# Patient Record
Sex: MALE | Race: WHITE | NOT HISPANIC OR LATINO
[De-identification: names, ages, dates, MRNs, and addresses within clinical notes are randomized per-mention and may not be internally consistent; named-entity substitution may affect disease eponyms.]

---

## 2017-03-13 ENCOUNTER — APPOINTMENT (OUTPATIENT)
Dept: CARDIOLOGY | Facility: CLINIC | Age: 77
End: 2017-03-13

## 2017-03-13 ENCOUNTER — NON-APPOINTMENT (OUTPATIENT)
Age: 77
End: 2017-03-13

## 2017-03-13 VITALS
OXYGEN SATURATION: 97 % | WEIGHT: 212 LBS | SYSTOLIC BLOOD PRESSURE: 126 MMHG | DIASTOLIC BLOOD PRESSURE: 89 MMHG | TEMPERATURE: 97.8 F | BODY MASS INDEX: 31.4 KG/M2 | HEIGHT: 69 IN | HEART RATE: 64 BPM

## 2017-03-13 DIAGNOSIS — R73.03 PREDIABETES.: ICD-10-CM

## 2017-03-15 ENCOUNTER — MEDICATION RENEWAL (OUTPATIENT)
Age: 77
End: 2017-03-15

## 2017-03-15 LAB
ANION GAP SERPL CALC-SCNC: 16 MMOL/L
BUN SERPL-MCNC: 24 MG/DL
CALCIUM SERPL-MCNC: 10 MG/DL
CHLORIDE SERPL-SCNC: 96 MMOL/L
CO2 SERPL-SCNC: 27 MMOL/L
CREAT SERPL-MCNC: 1.13 MG/DL
GLUCOSE SERPL-MCNC: 77 MG/DL
HBA1C MFR BLD HPLC: 5.7 %
POTASSIUM SERPL-SCNC: 3.5 MMOL/L
SODIUM SERPL-SCNC: 139 MMOL/L

## 2017-04-24 ENCOUNTER — RX RENEWAL (OUTPATIENT)
Age: 77
End: 2017-04-24

## 2017-05-01 ENCOUNTER — RX RENEWAL (OUTPATIENT)
Age: 77
End: 2017-05-01

## 2017-06-19 ENCOUNTER — MEDICATION RENEWAL (OUTPATIENT)
Age: 77
End: 2017-06-19

## 2017-06-19 RX ORDER — SUVOREXANT 20 MG/1
20 TABLET, FILM COATED ORAL
Refills: 0 | Status: ACTIVE | COMMUNITY

## 2017-09-25 ENCOUNTER — APPOINTMENT (OUTPATIENT)
Dept: PULMONOLOGY | Facility: CLINIC | Age: 77
End: 2017-09-25
Payer: MEDICARE

## 2017-09-25 VITALS
DIASTOLIC BLOOD PRESSURE: 90 MMHG | TEMPERATURE: 95 F | OXYGEN SATURATION: 97 % | HEIGHT: 69 IN | WEIGHT: 209 LBS | BODY MASS INDEX: 30.96 KG/M2 | HEART RATE: 63 BPM | RESPIRATION RATE: 18 BRPM | SYSTOLIC BLOOD PRESSURE: 150 MMHG

## 2017-09-25 PROCEDURE — 99204 OFFICE O/P NEW MOD 45 MIN: CPT

## 2017-10-09 ENCOUNTER — RX RENEWAL (OUTPATIENT)
Age: 77
End: 2017-10-09

## 2017-10-10 ENCOUNTER — APPOINTMENT (OUTPATIENT)
Dept: PULMONOLOGY | Facility: CLINIC | Age: 77
End: 2017-10-10
Payer: MEDICARE

## 2017-10-10 PROCEDURE — 96101: CPT

## 2017-10-10 PROCEDURE — 90791 PSYCH DIAGNOSTIC EVALUATION: CPT | Mod: 59

## 2017-10-17 ENCOUNTER — APPOINTMENT (OUTPATIENT)
Dept: PULMONOLOGY | Facility: CLINIC | Age: 77
End: 2017-10-17
Payer: MEDICARE

## 2017-10-17 PROCEDURE — 90837 PSYTX W PT 60 MINUTES: CPT

## 2017-10-18 ENCOUNTER — MEDICATION RENEWAL (OUTPATIENT)
Age: 77
End: 2017-10-18

## 2017-10-24 ENCOUNTER — APPOINTMENT (OUTPATIENT)
Dept: PULMONOLOGY | Facility: CLINIC | Age: 77
End: 2017-10-24
Payer: MEDICARE

## 2017-10-24 PROCEDURE — 90837 PSYTX W PT 60 MINUTES: CPT

## 2017-10-27 ENCOUNTER — RX RENEWAL (OUTPATIENT)
Age: 77
End: 2017-10-27

## 2017-10-31 ENCOUNTER — APPOINTMENT (OUTPATIENT)
Dept: PULMONOLOGY | Facility: CLINIC | Age: 77
End: 2017-10-31
Payer: MEDICARE

## 2017-10-31 PROCEDURE — 90837 PSYTX W PT 60 MINUTES: CPT

## 2017-11-02 ENCOUNTER — APPOINTMENT (OUTPATIENT)
Dept: CARDIOLOGY | Facility: CLINIC | Age: 77
End: 2017-11-02
Payer: MEDICARE

## 2017-11-02 ENCOUNTER — NON-APPOINTMENT (OUTPATIENT)
Age: 77
End: 2017-11-02

## 2017-11-02 VITALS
WEIGHT: 207 LBS | HEART RATE: 66 BPM | DIASTOLIC BLOOD PRESSURE: 60 MMHG | SYSTOLIC BLOOD PRESSURE: 151 MMHG | OXYGEN SATURATION: 94 % | BODY MASS INDEX: 30.57 KG/M2

## 2017-11-02 DIAGNOSIS — F51.04 PSYCHOPHYSIOLOGIC INSOMNIA: ICD-10-CM

## 2017-11-02 DIAGNOSIS — J44.9 CHRONIC OBSTRUCTIVE PULMONARY DISEASE, UNSPECIFIED: ICD-10-CM

## 2017-11-02 PROCEDURE — 93000 ELECTROCARDIOGRAM COMPLETE: CPT

## 2017-11-02 PROCEDURE — 99214 OFFICE O/P EST MOD 30 MIN: CPT

## 2017-11-02 PROCEDURE — 36415 COLL VENOUS BLD VENIPUNCTURE: CPT

## 2017-11-02 RX ORDER — DIGOXIN 125 UG/1
125 TABLET ORAL
Qty: 90 | Refills: 3 | Status: DISCONTINUED | COMMUNITY
Start: 2017-04-24 | End: 2017-11-02

## 2017-11-03 LAB
ALBUMIN SERPL ELPH-MCNC: 4.4 G/DL
ALP BLD-CCNC: 67 U/L
ALT SERPL-CCNC: 29 U/L
ANION GAP SERPL CALC-SCNC: 16 MMOL/L
AST SERPL-CCNC: 31 U/L
BASOPHILS # BLD AUTO: 0.05 K/UL
BASOPHILS NFR BLD AUTO: 0.5 %
BILIRUB SERPL-MCNC: 0.3 MG/DL
BUN SERPL-MCNC: 24 MG/DL
CALCIUM SERPL-MCNC: 10.2 MG/DL
CHLORIDE SERPL-SCNC: 99 MMOL/L
CHOLEST SERPL-MCNC: 137 MG/DL
CHOLEST/HDLC SERPL: 4.3 RATIO
CO2 SERPL-SCNC: 28 MMOL/L
CREAT SERPL-MCNC: 1.29 MG/DL
EOSINOPHIL # BLD AUTO: 0.2 K/UL
EOSINOPHIL NFR BLD AUTO: 1.9 %
GLUCOSE SERPL-MCNC: 99 MG/DL
HBA1C MFR BLD HPLC: 5.6 %
HCT VFR BLD CALC: 47 %
HDLC SERPL-MCNC: 32 MG/DL
HGB BLD-MCNC: 15.8 G/DL
IMM GRANULOCYTES NFR BLD AUTO: 0.5 %
LDLC SERPL CALC-MCNC: 55 MG/DL
LYMPHOCYTES # BLD AUTO: 2.67 K/UL
LYMPHOCYTES NFR BLD AUTO: 25.1 %
MAN DIFF?: NORMAL
MCHC RBC-ENTMCNC: 30.3 PG
MCHC RBC-ENTMCNC: 33.6 GM/DL
MCV RBC AUTO: 90.2 FL
MONOCYTES # BLD AUTO: 0.97 K/UL
MONOCYTES NFR BLD AUTO: 9.1 %
NEUTROPHILS # BLD AUTO: 6.69 K/UL
NEUTROPHILS NFR BLD AUTO: 62.9 %
PLATELET # BLD AUTO: 299 K/UL
POTASSIUM SERPL-SCNC: 3.5 MMOL/L
PROT SERPL-MCNC: 7.6 G/DL
PSA SERPL-MCNC: 2.58 NG/ML
RBC # BLD: 5.21 M/UL
RBC # FLD: 13.7 %
SODIUM SERPL-SCNC: 143 MMOL/L
TRIGL SERPL-MCNC: 251 MG/DL
TSH SERPL-ACNC: 1.36 UIU/ML
WBC # FLD AUTO: 10.63 K/UL

## 2017-11-08 ENCOUNTER — APPOINTMENT (OUTPATIENT)
Dept: PULMONOLOGY | Facility: CLINIC | Age: 77
End: 2017-11-08

## 2017-11-08 ENCOUNTER — APPOINTMENT (OUTPATIENT)
Dept: PULMONOLOGY | Facility: CLINIC | Age: 77
End: 2017-11-08
Payer: MEDICARE

## 2017-11-08 PROCEDURE — 90837 PSYTX W PT 60 MINUTES: CPT

## 2017-11-22 ENCOUNTER — APPOINTMENT (OUTPATIENT)
Dept: PULMONOLOGY | Facility: CLINIC | Age: 77
End: 2017-11-22

## 2017-12-04 ENCOUNTER — RX RENEWAL (OUTPATIENT)
Age: 77
End: 2017-12-04

## 2017-12-20 ENCOUNTER — RX RENEWAL (OUTPATIENT)
Age: 77
End: 2017-12-20

## 2017-12-20 ENCOUNTER — MEDICATION RENEWAL (OUTPATIENT)
Age: 77
End: 2017-12-20

## 2017-12-20 RX ORDER — TRAZODONE HYDROCHLORIDE 100 MG/1
100 TABLET ORAL
Qty: 30 | Refills: 0 | Status: ACTIVE | COMMUNITY
Start: 2017-09-25 | End: 1900-01-01

## 2017-12-22 ENCOUNTER — RX RENEWAL (OUTPATIENT)
Age: 77
End: 2017-12-22

## 2018-01-31 ENCOUNTER — RX RENEWAL (OUTPATIENT)
Age: 78
End: 2018-01-31

## 2018-02-26 ENCOUNTER — MEDICATION RENEWAL (OUTPATIENT)
Age: 78
End: 2018-02-26

## 2018-03-08 ENCOUNTER — NON-APPOINTMENT (OUTPATIENT)
Age: 78
End: 2018-03-08

## 2018-03-08 ENCOUNTER — APPOINTMENT (OUTPATIENT)
Dept: CARDIOLOGY | Facility: CLINIC | Age: 78
End: 2018-03-08
Payer: MEDICARE

## 2018-03-08 VITALS
WEIGHT: 211 LBS | SYSTOLIC BLOOD PRESSURE: 166 MMHG | DIASTOLIC BLOOD PRESSURE: 81 MMHG | HEART RATE: 67 BPM | OXYGEN SATURATION: 94 % | HEIGHT: 69 IN | TEMPERATURE: 98.6 F | BODY MASS INDEX: 31.25 KG/M2

## 2018-03-08 VITALS — SYSTOLIC BLOOD PRESSURE: 140 MMHG | DIASTOLIC BLOOD PRESSURE: 70 MMHG

## 2018-03-08 DIAGNOSIS — F32.9 MAJOR DEPRESSIVE DISORDER, SINGLE EPISODE, UNSPECIFIED: ICD-10-CM

## 2018-03-08 DIAGNOSIS — I48.91 UNSPECIFIED ATRIAL FIBRILLATION: ICD-10-CM

## 2018-03-08 DIAGNOSIS — I10 ESSENTIAL (PRIMARY) HYPERTENSION: ICD-10-CM

## 2018-03-08 PROCEDURE — 99214 OFFICE O/P EST MOD 30 MIN: CPT

## 2018-03-08 PROCEDURE — 93000 ELECTROCARDIOGRAM COMPLETE: CPT

## 2018-05-29 ENCOUNTER — RX RENEWAL (OUTPATIENT)
Age: 78
End: 2018-05-29

## 2018-07-13 ENCOUNTER — MEDICATION RENEWAL (OUTPATIENT)
Age: 78
End: 2018-07-13

## 2018-07-17 ENCOUNTER — MEDICATION RENEWAL (OUTPATIENT)
Age: 78
End: 2018-07-17

## 2018-07-18 ENCOUNTER — MEDICATION RENEWAL (OUTPATIENT)
Age: 78
End: 2018-07-18

## 2018-07-22 PROBLEM — R73.03 PREDIABETES: Status: ACTIVE | Noted: 2017-03-13

## 2018-08-27 ENCOUNTER — RX RENEWAL (OUTPATIENT)
Age: 78
End: 2018-08-27

## 2018-09-13 ENCOUNTER — APPOINTMENT (OUTPATIENT)
Dept: CARDIOLOGY | Facility: CLINIC | Age: 78
End: 2018-09-13

## 2021-06-12 ENCOUNTER — EMERGENCY (EMERGENCY)
Facility: HOSPITAL | Age: 81
LOS: 1 days | Discharge: HOME | End: 2021-06-12
Attending: EMERGENCY MEDICINE | Admitting: STUDENT IN AN ORGANIZED HEALTH CARE EDUCATION/TRAINING PROGRAM
Payer: MEDICARE

## 2021-06-12 VITALS
SYSTOLIC BLOOD PRESSURE: 150 MMHG | HEART RATE: 88 BPM | TEMPERATURE: 101 F | OXYGEN SATURATION: 95 % | DIASTOLIC BLOOD PRESSURE: 80 MMHG | HEIGHT: 69 IN | WEIGHT: 199.96 LBS | RESPIRATION RATE: 18 BRPM

## 2021-06-12 VITALS
TEMPERATURE: 98 F | DIASTOLIC BLOOD PRESSURE: 70 MMHG | HEART RATE: 80 BPM | RESPIRATION RATE: 18 BRPM | OXYGEN SATURATION: 94 % | SYSTOLIC BLOOD PRESSURE: 160 MMHG

## 2021-06-12 LAB
ALBUMIN SERPL ELPH-MCNC: 3.6 G/DL — SIGNIFICANT CHANGE UP (ref 3.3–5)
ALP SERPL-CCNC: 80 U/L — SIGNIFICANT CHANGE UP (ref 40–120)
ALT FLD-CCNC: 20 U/L — SIGNIFICANT CHANGE UP (ref 12–78)
ANION GAP SERPL CALC-SCNC: 7 MMOL/L — SIGNIFICANT CHANGE UP (ref 5–17)
APPEARANCE UR: CLEAR — SIGNIFICANT CHANGE UP
APTT BLD: 30 SEC — SIGNIFICANT CHANGE UP (ref 27.5–35.5)
AST SERPL-CCNC: 14 U/L — LOW (ref 15–37)
BASOPHILS # BLD AUTO: 0.03 K/UL — SIGNIFICANT CHANGE UP (ref 0–0.2)
BASOPHILS NFR BLD AUTO: 0.3 % — SIGNIFICANT CHANGE UP (ref 0–2)
BILIRUB SERPL-MCNC: 0.6 MG/DL — SIGNIFICANT CHANGE UP (ref 0.2–1.2)
BILIRUB UR-MCNC: NEGATIVE — SIGNIFICANT CHANGE UP
BUN SERPL-MCNC: 21 MG/DL — SIGNIFICANT CHANGE UP (ref 7–23)
CALCIUM SERPL-MCNC: 8.8 MG/DL — SIGNIFICANT CHANGE UP (ref 8.5–10.1)
CHLORIDE SERPL-SCNC: 102 MMOL/L — SIGNIFICANT CHANGE UP (ref 96–108)
CO2 SERPL-SCNC: 31 MMOL/L — SIGNIFICANT CHANGE UP (ref 22–31)
COLOR SPEC: YELLOW — SIGNIFICANT CHANGE UP
CREAT SERPL-MCNC: 0.97 MG/DL — SIGNIFICANT CHANGE UP (ref 0.5–1.3)
DIFF PNL FLD: NEGATIVE — SIGNIFICANT CHANGE UP
DIGOXIN SERPL-MCNC: 0.1 NG/ML — LOW (ref 0.8–2)
EOSINOPHIL # BLD AUTO: 0.02 K/UL — SIGNIFICANT CHANGE UP (ref 0–0.5)
EOSINOPHIL NFR BLD AUTO: 0.2 % — SIGNIFICANT CHANGE UP (ref 0–6)
GLUCOSE SERPL-MCNC: 112 MG/DL — HIGH (ref 70–99)
GLUCOSE UR QL: NEGATIVE — SIGNIFICANT CHANGE UP
HCT VFR BLD CALC: 46.9 % — SIGNIFICANT CHANGE UP (ref 39–50)
HGB BLD-MCNC: 16 G/DL — SIGNIFICANT CHANGE UP (ref 13–17)
IMM GRANULOCYTES NFR BLD AUTO: 0.4 % — SIGNIFICANT CHANGE UP (ref 0–1.5)
INR BLD: 1.22 RATIO — HIGH (ref 0.88–1.16)
KETONES UR-MCNC: NEGATIVE — SIGNIFICANT CHANGE UP
LACTATE SERPL-SCNC: 1.4 MMOL/L — SIGNIFICANT CHANGE UP (ref 0.7–2)
LEUKOCYTE ESTERASE UR-ACNC: ABNORMAL
LYMPHOCYTES # BLD AUTO: 0.85 K/UL — LOW (ref 1–3.3)
LYMPHOCYTES # BLD AUTO: 8.7 % — LOW (ref 13–44)
MCHC RBC-ENTMCNC: 29.3 PG — SIGNIFICANT CHANGE UP (ref 27–34)
MCHC RBC-ENTMCNC: 34.1 GM/DL — SIGNIFICANT CHANGE UP (ref 32–36)
MCV RBC AUTO: 85.9 FL — SIGNIFICANT CHANGE UP (ref 80–100)
MONOCYTES # BLD AUTO: 0.77 K/UL — SIGNIFICANT CHANGE UP (ref 0–0.9)
MONOCYTES NFR BLD AUTO: 7.9 % — SIGNIFICANT CHANGE UP (ref 2–14)
NEUTROPHILS # BLD AUTO: 8.02 K/UL — HIGH (ref 1.8–7.4)
NEUTROPHILS NFR BLD AUTO: 82.5 % — HIGH (ref 43–77)
NITRITE UR-MCNC: NEGATIVE — SIGNIFICANT CHANGE UP
NRBC # BLD: 0 /100 WBCS — SIGNIFICANT CHANGE UP (ref 0–0)
PH UR: 6.5 — SIGNIFICANT CHANGE UP (ref 5–8)
PLATELET # BLD AUTO: 240 K/UL — SIGNIFICANT CHANGE UP (ref 150–400)
POTASSIUM SERPL-MCNC: 3 MMOL/L — LOW (ref 3.5–5.3)
POTASSIUM SERPL-SCNC: 3 MMOL/L — LOW (ref 3.5–5.3)
PROCALCITONIN SERPL-MCNC: 0.16 NG/ML — HIGH (ref 0–0.04)
PROT SERPL-MCNC: 7.5 G/DL — SIGNIFICANT CHANGE UP (ref 6–8.3)
PROT UR-MCNC: NEGATIVE — SIGNIFICANT CHANGE UP
PROTHROM AB SERPL-ACNC: 14.1 SEC — HIGH (ref 10.6–13.6)
RAPID RVP RESULT: SIGNIFICANT CHANGE UP
RBC # BLD: 5.46 M/UL — SIGNIFICANT CHANGE UP (ref 4.2–5.8)
RBC # FLD: 13.3 % — SIGNIFICANT CHANGE UP (ref 10.3–14.5)
SARS-COV-2 RNA SPEC QL NAA+PROBE: SIGNIFICANT CHANGE UP
SODIUM SERPL-SCNC: 140 MMOL/L — SIGNIFICANT CHANGE UP (ref 135–145)
SP GR SPEC: 1.01 — SIGNIFICANT CHANGE UP (ref 1.01–1.02)
UROBILINOGEN FLD QL: NEGATIVE — SIGNIFICANT CHANGE UP
WBC # BLD: 9.73 K/UL — SIGNIFICANT CHANGE UP (ref 3.8–10.5)
WBC # FLD AUTO: 9.73 K/UL — SIGNIFICANT CHANGE UP (ref 3.8–10.5)

## 2021-06-12 PROCEDURE — 71045 X-RAY EXAM CHEST 1 VIEW: CPT | Mod: 26

## 2021-06-12 PROCEDURE — 74176 CT ABD & PELVIS W/O CONTRAST: CPT | Mod: 26,MA

## 2021-06-12 PROCEDURE — 87040 BLOOD CULTURE FOR BACTERIA: CPT

## 2021-06-12 PROCEDURE — 71045 X-RAY EXAM CHEST 1 VIEW: CPT

## 2021-06-12 PROCEDURE — 99285 EMERGENCY DEPT VISIT HI MDM: CPT | Mod: 25

## 2021-06-12 PROCEDURE — 71250 CT THORAX DX C-: CPT | Mod: 26,MA

## 2021-06-12 PROCEDURE — 71250 CT THORAX DX C-: CPT

## 2021-06-12 PROCEDURE — 93010 ELECTROCARDIOGRAM REPORT: CPT

## 2021-06-12 PROCEDURE — 85025 COMPLETE CBC W/AUTO DIFF WBC: CPT

## 2021-06-12 PROCEDURE — 0225U NFCT DS DNA&RNA 21 SARSCOV2: CPT

## 2021-06-12 PROCEDURE — 80162 ASSAY OF DIGOXIN TOTAL: CPT

## 2021-06-12 PROCEDURE — 80053 COMPREHEN METABOLIC PANEL: CPT

## 2021-06-12 PROCEDURE — 93005 ELECTROCARDIOGRAM TRACING: CPT

## 2021-06-12 PROCEDURE — 87086 URINE CULTURE/COLONY COUNT: CPT

## 2021-06-12 PROCEDURE — 74176 CT ABD & PELVIS W/O CONTRAST: CPT

## 2021-06-12 PROCEDURE — 84145 PROCALCITONIN (PCT): CPT

## 2021-06-12 PROCEDURE — 83605 ASSAY OF LACTIC ACID: CPT

## 2021-06-12 PROCEDURE — 85730 THROMBOPLASTIN TIME PARTIAL: CPT

## 2021-06-12 PROCEDURE — 96365 THER/PROPH/DIAG IV INF INIT: CPT

## 2021-06-12 PROCEDURE — 81001 URINALYSIS AUTO W/SCOPE: CPT

## 2021-06-12 PROCEDURE — 99284 EMERGENCY DEPT VISIT MOD MDM: CPT | Mod: CS

## 2021-06-12 PROCEDURE — 85610 PROTHROMBIN TIME: CPT

## 2021-06-12 PROCEDURE — 36415 COLL VENOUS BLD VENIPUNCTURE: CPT

## 2021-06-12 RX ORDER — ACETAMINOPHEN 500 MG
650 TABLET ORAL ONCE
Refills: 0 | Status: COMPLETED | OUTPATIENT
Start: 2021-06-12 | End: 2021-06-12

## 2021-06-12 RX ORDER — SODIUM CHLORIDE 9 MG/ML
2800 INJECTION, SOLUTION INTRAVENOUS ONCE
Refills: 0 | Status: COMPLETED | OUTPATIENT
Start: 2021-06-12 | End: 2021-06-12

## 2021-06-12 RX ORDER — CEFTRIAXONE 500 MG/1
1000 INJECTION, POWDER, FOR SOLUTION INTRAMUSCULAR; INTRAVENOUS ONCE
Refills: 0 | Status: COMPLETED | OUTPATIENT
Start: 2021-06-12 | End: 2021-06-12

## 2021-06-12 RX ORDER — POTASSIUM CHLORIDE 20 MEQ
40 PACKET (EA) ORAL ONCE
Refills: 0 | Status: COMPLETED | OUTPATIENT
Start: 2021-06-12 | End: 2021-06-12

## 2021-06-12 RX ADMIN — Medication 40 MILLIEQUIVALENT(S): at 20:04

## 2021-06-12 RX ADMIN — SODIUM CHLORIDE 2800 MILLILITER(S): 9 INJECTION, SOLUTION INTRAVENOUS at 17:23

## 2021-06-12 RX ADMIN — CEFTRIAXONE 1000 MILLIGRAM(S): 500 INJECTION, POWDER, FOR SOLUTION INTRAMUSCULAR; INTRAVENOUS at 22:00

## 2021-06-12 RX ADMIN — Medication 650 MILLIGRAM(S): at 17:23

## 2021-06-12 RX ADMIN — CEFTRIAXONE 100 MILLIGRAM(S): 500 INJECTION, POWDER, FOR SOLUTION INTRAMUSCULAR; INTRAVENOUS at 21:34

## 2021-06-12 NOTE — ED ADULT NURSE NOTE - OBJECTIVE STATEMENT
Patient is a 79yo male presenting to [facility] with weakness and fever shortness of breath  x 1 month. Patient denies pain in abdominal chest pain nausea vomiting diarrhea Patient states that he has been feeling tired and weak for weeks but today was worse then other days. Patient is disoriented to time

## 2021-06-12 NOTE — ED ADULT NURSE NOTE - NSIMPLEMENTINTERV_GEN_ALL_ED
Implemented All Fall Risk Interventions:  [place] to call system. Call bell, personal items and telephone within reach. Instruct patient to call for assistance. Room bathroom lighting operational. Non-slip footwear when patient is off stretcher. Physically safe environment: no spills, clutter or unnecessary equipment. Stretcher in lowest position, wheels locked, appropriate side rails in place. Provide visual cue, wrist band, yellow gown, etc. Monitor gait and stability. Monitor for mental status changes and reorient to person, place, and time. Review medications for side effects contributing to fall risk. Reinforce activity limits and safety measures with patient and family.

## 2021-06-12 NOTE — ED ADULT NURSE NOTE - NS TRANSFER EYEGLASSES PAIRS
1 pair Ear Star Wedge Flap Text: The defect edges were debeveled with a #15 blade scalpel.  Given the location of the defect and the proximity to free margins (helical rim) an ear star wedge flap was deemed most appropriate.  Using a sterile surgical marker, the appropriate flap was drawn incorporating the defect and placing the expected incisions between the helical rim and antihelix where possible.  The area thus outlined was incised through and through with a #15 scalpel blade.

## 2021-06-12 NOTE — ED ADULT NURSE REASSESSMENT NOTE - NS ED NURSE REASSESS COMMENT FT1
Patient ambulated around nurses station with a walker with steady gait. Oxygen after ambulation 94% room. Respiration even and not labored. Dr. Samaniego made aware. Family to   patient for discharge. Rosa PEREZ

## 2021-06-12 NOTE — ED PROVIDER NOTE - OBJECTIVE STATEMENT
79 yo white male with H/O A.Fib, HTN and Asthma as well as Depression who was in his baseline state of health up until 1-2 days when he noted becoming weaker and more fatigued. Daughter found patient much weaker than usual being unable to ambulate to bathroom at home. EMS was ultimately called and patient was brought to ED for further evaluation and management. Denies any noted fever, chills, nausea, vomiting, diarrhea, cough, abdominal pains or dysuria/hematuria. Patient did have Covid Immunization in February.

## 2021-06-12 NOTE — ED PROVIDER NOTE - CLINICAL SUMMARY MEDICAL DECISION MAKING FREE TEXT BOX
Weakness and fatigue noted to be febrile in ED now requiring evaluation, labs, ecg, imaging as well as IVFS and  meds.

## 2021-06-12 NOTE — ED PROVIDER NOTE - NSFOLLOWUPINSTRUCTIONS_ED_ALL_ED_FT
Please follow up with your Primary Care Physician as soon as possible.  Please take your medications as prescribed.  If your symptoms persist or worsen, please seek care. Either return to the Emergency Department, go to urgent care or see your primary care doctor.   You potassium was slightly low, you were given a potassium supplement in the ER. Please eat a diet rich in potassium, foods you can eat include potatoes, bananas, orange juice.    ====================================================================    Fever in Adults    WHAT YOU NEED TO KNOW:    A fever is an increase in your body temperature. Normal body temperature is 98.6°F (37°C). Fever is generally defined as greater than 100.4°F (38°C). Common causes include an infection, injury, or disease such as arthritis.    DISCHARGE INSTRUCTIONS:    Return to the emergency department if:     Your fever does not go away or gets worse even after treatment.      You have a stiff neck and a bad headache.       You are confused. You may not be able to think clearly or remember things like you normally do.       Your heart beats faster than usual even after treatment.      You have shortness of breath or chest pain when you breathe.      You urinate small amounts or not at all.       Your skin, lips, or nails turn blue.     Contact your healthcare provider if:     You have abdominal pain or you feel bloated.      You have nausea or are vomiting.      You have pain or burning when you urinate, or you have pain in your back.      You have questions or concerns about your condition or care.     Medicines: You may need any of the following:     NSAIDs, such as ibuprofen, help decrease swelling, pain, and fever. This medicine is available with or without a doctor's order. NSAIDs can cause stomach bleeding or kidney problems in certain people. If you take blood thinner medicine, always ask if NSAIDs are safe for you. Always read the medicine label and follow directions. Do not give these medicines to children under 6 months of age without direction from your child's healthcare provider.      Acetaminophen decreases pain and fever. It is available without a doctor's order. Ask how much to take and how often to take it. Follow directions. Read the labels of all other medicines you are using to see if they also contain acetaminophen, or ask your doctor or pharmacist. Acetaminophen can cause liver damage if not taken correctly. Do not use more than 4 grams (4,000 milligrams) total of acetaminophen in one day.       Antibiotics may be given if you have an infection caused by bacteria.      Take your medicine as directed. Contact your healthcare provider if you think your medicine is not helping or if you have side effects. Tell him of her if you are allergic to any medicine. Keep a list of the medicines, vitamins, and herbs you take. Include the amounts, and when and why you take them. Bring the list or the pill bottles to follow-up visits. Carry your medicine list with you in case of an emergency.    Follow up with your healthcare provider as directed: Write down your questions so you remember to ask them during your visits.    Self-care:     Drink more liquids as directed. A fever makes you sweat. This can increase your risk for dehydration. Liquids can help prevent dehydration.   Drink at least 6 to 8 eight-ounce cups of clear liquids each day. Drink water, juice, or broth. Do not drink sports drinks. They may contain caffeine.      Ask your healthcare provider if you should drink an oral rehydration solution (ORS). An ORS has the right amounts of water, salts, and sugar you need to replace body fluids.      Dress in lightweight clothes. Shivers may be a sign that your fever is rising. Do not put extra blankets or clothes on. This may cause your fever to rise even higher. Dress in light, comfortable clothing. Use a lightweight blanket or sheet when you sleep. Change your clothes, blanket, or sheets if they get wet.      Cool yourself safely. Take a bath in cool or lukewarm water. Use an ice pack wrapped in a small towel or wet a washcloth with cool water. Place the ice pack or wet washcloth on your forehead or the back of your neck.

## 2021-06-12 NOTE — ED PROVIDER NOTE - PROGRESS NOTE DETAILS
O2 sat on Room Air now at 93-96%. Will get CT scan to assess and search for fever etiology. KV: s/o was to follow up CTs, CTs with incidental findings, patient and family made aware. patient feels well, was ambulated with spO2 not dropping. will discharge.

## 2021-06-12 NOTE — ED PROVIDER NOTE - PATIENT PORTAL LINK FT
You can access the FollowMyHealth Patient Portal offered by [facility] by registering at the following website: [email or web address]. By joining [facility]’s FollowMyHealth portal, you will also be able to view your health information using other applications (apps) compatible with our system.

## 2021-06-12 NOTE — ED PROVIDER NOTE - CONSTITUTIONAL, MLM
normal... Weak appearing obese, elderly white male, awake, oriented to person, place, time/situation in no apparent distress.

## 2021-06-13 LAB
CULTURE RESULTS: SIGNIFICANT CHANGE UP
SPECIMEN SOURCE: SIGNIFICANT CHANGE UP

## 2021-06-17 LAB
CULTURE RESULTS: SIGNIFICANT CHANGE UP
CULTURE RESULTS: SIGNIFICANT CHANGE UP
SPECIMEN SOURCE: SIGNIFICANT CHANGE UP
SPECIMEN SOURCE: SIGNIFICANT CHANGE UP

## 2021-09-26 ENCOUNTER — TRANSCRIPTION ENCOUNTER (OUTPATIENT)
Age: 81
End: 2021-09-26

## 2023-01-17 ENCOUNTER — INPATIENT (INPATIENT)
Facility: HOSPITAL | Age: 83
LOS: 8 days | Discharge: OTHER INSTITUTION | DRG: 864 | End: 2023-01-26
Attending: INTERNAL MEDICINE | Admitting: INTERNAL MEDICINE
Payer: MEDICARE

## 2023-01-17 VITALS
TEMPERATURE: 99 F | DIASTOLIC BLOOD PRESSURE: 94 MMHG | WEIGHT: 199.96 LBS | SYSTOLIC BLOOD PRESSURE: 174 MMHG | OXYGEN SATURATION: 93 % | HEIGHT: 68 IN | HEART RATE: 82 BPM | RESPIRATION RATE: 20 BRPM

## 2023-01-17 DIAGNOSIS — N40.0 BENIGN PROSTATIC HYPERPLASIA WITHOUT LOWER URINARY TRACT SYMPTOMS: ICD-10-CM

## 2023-01-17 DIAGNOSIS — R50.9 FEVER, UNSPECIFIED: ICD-10-CM

## 2023-01-17 DIAGNOSIS — I48.0 PAROXYSMAL ATRIAL FIBRILLATION: ICD-10-CM

## 2023-01-17 DIAGNOSIS — I10 ESSENTIAL (PRIMARY) HYPERTENSION: ICD-10-CM

## 2023-01-17 DIAGNOSIS — E78.5 HYPERLIPIDEMIA, UNSPECIFIED: ICD-10-CM

## 2023-01-17 PROBLEM — J45.909 UNSPECIFIED ASTHMA, UNCOMPLICATED: Chronic | Status: ACTIVE | Noted: 2021-06-12

## 2023-01-17 PROBLEM — I48.91 UNSPECIFIED ATRIAL FIBRILLATION: Chronic | Status: ACTIVE | Noted: 2021-06-12

## 2023-01-17 LAB
ALBUMIN SERPL ELPH-MCNC: 3.3 G/DL — SIGNIFICANT CHANGE UP (ref 3.3–5)
ALP SERPL-CCNC: 73 U/L — SIGNIFICANT CHANGE UP (ref 30–120)
ALT FLD-CCNC: 13 U/L DA — SIGNIFICANT CHANGE UP (ref 10–60)
ANION GAP SERPL CALC-SCNC: 10 MMOL/L — SIGNIFICANT CHANGE UP (ref 5–17)
APPEARANCE UR: CLEAR — SIGNIFICANT CHANGE UP
APTT BLD: 32 SEC — SIGNIFICANT CHANGE UP (ref 27.5–35.5)
AST SERPL-CCNC: 11 U/L — SIGNIFICANT CHANGE UP (ref 10–40)
BASOPHILS # BLD AUTO: 0 K/UL — SIGNIFICANT CHANGE UP (ref 0–0.2)
BASOPHILS NFR BLD AUTO: 0 % — SIGNIFICANT CHANGE UP (ref 0–2)
BILIRUB SERPL-MCNC: 1.2 MG/DL — SIGNIFICANT CHANGE UP (ref 0.2–1.2)
BILIRUB UR-MCNC: NEGATIVE — SIGNIFICANT CHANGE UP
BUN SERPL-MCNC: 21 MG/DL — SIGNIFICANT CHANGE UP (ref 7–23)
CALCIUM SERPL-MCNC: 9.3 MG/DL — SIGNIFICANT CHANGE UP (ref 8.4–10.5)
CHLORIDE SERPL-SCNC: 104 MMOL/L — SIGNIFICANT CHANGE UP (ref 96–108)
CO2 SERPL-SCNC: 29 MMOL/L — SIGNIFICANT CHANGE UP (ref 22–31)
COLOR SPEC: YELLOW — SIGNIFICANT CHANGE UP
CREAT SERPL-MCNC: 1.03 MG/DL — SIGNIFICANT CHANGE UP (ref 0.5–1.3)
DIFF PNL FLD: ABNORMAL
EGFR: 72 ML/MIN/1.73M2 — SIGNIFICANT CHANGE UP
EOSINOPHIL # BLD AUTO: 0 K/UL — SIGNIFICANT CHANGE UP (ref 0–0.5)
EOSINOPHIL NFR BLD AUTO: 0 % — SIGNIFICANT CHANGE UP (ref 0–6)
EPI CELLS # UR: SIGNIFICANT CHANGE UP
FLUAV AG NPH QL: SIGNIFICANT CHANGE UP
FLUBV AG NPH QL: SIGNIFICANT CHANGE UP
GLUCOSE SERPL-MCNC: 129 MG/DL — HIGH (ref 70–99)
GLUCOSE UR QL: NEGATIVE MG/DL — SIGNIFICANT CHANGE UP
HCT VFR BLD CALC: 47.3 % — SIGNIFICANT CHANGE UP (ref 39–50)
HGB BLD-MCNC: 15.8 G/DL — SIGNIFICANT CHANGE UP (ref 13–17)
INR BLD: 1.59 RATIO — HIGH (ref 0.88–1.16)
KETONES UR-MCNC: ABNORMAL
LACTATE SERPL-SCNC: 1.1 MMOL/L — SIGNIFICANT CHANGE UP (ref 0.7–2)
LEUKOCYTE ESTERASE UR-ACNC: NEGATIVE — SIGNIFICANT CHANGE UP
LYMPHOCYTES # BLD AUTO: 12 % — LOW (ref 13–44)
LYMPHOCYTES # BLD AUTO: 2.12 K/UL — SIGNIFICANT CHANGE UP (ref 1–3.3)
MANUAL SMEAR VERIFICATION: SIGNIFICANT CHANGE UP
MCHC RBC-ENTMCNC: 29.2 PG — SIGNIFICANT CHANGE UP (ref 27–34)
MCHC RBC-ENTMCNC: 33.4 GM/DL — SIGNIFICANT CHANGE UP (ref 32–36)
MCV RBC AUTO: 87.4 FL — SIGNIFICANT CHANGE UP (ref 80–100)
MONOCYTES # BLD AUTO: 1.24 K/UL — HIGH (ref 0–0.9)
MONOCYTES NFR BLD AUTO: 7 % — SIGNIFICANT CHANGE UP (ref 2–14)
NEUTROPHILS # BLD AUTO: 14.32 K/UL — HIGH (ref 1.8–7.4)
NEUTROPHILS NFR BLD AUTO: 79 % — HIGH (ref 43–77)
NEUTS BAND # BLD: 2 % — SIGNIFICANT CHANGE UP (ref 0–8)
NITRITE UR-MCNC: NEGATIVE — SIGNIFICANT CHANGE UP
NRBC # BLD: 0 — SIGNIFICANT CHANGE UP
NRBC # BLD: SIGNIFICANT CHANGE UP /100 WBCS (ref 0–0)
PH UR: 6.5 — SIGNIFICANT CHANGE UP (ref 5–8)
PLAT MORPH BLD: NORMAL — SIGNIFICANT CHANGE UP
PLATELET # BLD AUTO: 295 K/UL — SIGNIFICANT CHANGE UP (ref 150–400)
POTASSIUM SERPL-MCNC: 3.2 MMOL/L — LOW (ref 3.5–5.3)
POTASSIUM SERPL-SCNC: 3.2 MMOL/L — LOW (ref 3.5–5.3)
PROT SERPL-MCNC: 7.8 G/DL — SIGNIFICANT CHANGE UP (ref 6–8.3)
PROT UR-MCNC: 100 MG/DL
PROTHROM AB SERPL-ACNC: 18.8 SEC — HIGH (ref 10.5–13.4)
RBC # BLD: 5.41 M/UL — SIGNIFICANT CHANGE UP (ref 4.2–5.8)
RBC # FLD: 13.1 % — SIGNIFICANT CHANGE UP (ref 10.3–14.5)
RBC BLD AUTO: NORMAL — SIGNIFICANT CHANGE UP
RBC CASTS # UR COMP ASSIST: SIGNIFICANT CHANGE UP /HPF (ref 0–4)
RSV RNA NPH QL NAA+NON-PROBE: SIGNIFICANT CHANGE UP
SARS-COV-2 RNA SPEC QL NAA+PROBE: SIGNIFICANT CHANGE UP
SODIUM SERPL-SCNC: 143 MMOL/L — SIGNIFICANT CHANGE UP (ref 135–145)
SP GR SPEC: 1.02 — SIGNIFICANT CHANGE UP (ref 1.01–1.02)
UROBILINOGEN FLD QL: 4 MG/DL
WBC # BLD: 17.68 K/UL — HIGH (ref 3.8–10.5)
WBC # FLD AUTO: 17.68 K/UL — HIGH (ref 3.8–10.5)
WBC UR QL: NEGATIVE — SIGNIFICANT CHANGE UP

## 2023-01-17 PROCEDURE — 73562 X-RAY EXAM OF KNEE 3: CPT | Mod: 26,RT

## 2023-01-17 PROCEDURE — 70450 CT HEAD/BRAIN W/O DYE: CPT | Mod: 26,MA

## 2023-01-17 PROCEDURE — 71045 X-RAY EXAM CHEST 1 VIEW: CPT | Mod: 26

## 2023-01-17 PROCEDURE — 74176 CT ABD & PELVIS W/O CONTRAST: CPT | Mod: 26,MA

## 2023-01-17 PROCEDURE — 99285 EMERGENCY DEPT VISIT HI MDM: CPT | Mod: FS,CS

## 2023-01-17 RX ORDER — TAMSULOSIN HYDROCHLORIDE 0.4 MG/1
1 CAPSULE ORAL
Qty: 0 | Refills: 0 | DISCHARGE

## 2023-01-17 RX ORDER — MIRTAZAPINE 45 MG/1
7.5 TABLET, ORALLY DISINTEGRATING ORAL DAILY
Refills: 0 | Status: DISCONTINUED | OUTPATIENT
Start: 2023-01-17 | End: 2023-01-26

## 2023-01-17 RX ORDER — ROSUVASTATIN CALCIUM 5 MG/1
1 TABLET ORAL
Qty: 0 | Refills: 0 | DISCHARGE

## 2023-01-17 RX ORDER — ONDANSETRON 8 MG/1
4 TABLET, FILM COATED ORAL EVERY 6 HOURS
Refills: 0 | Status: DISCONTINUED | OUTPATIENT
Start: 2023-01-17 | End: 2023-01-20

## 2023-01-17 RX ORDER — DESONIDE OINTMENT, 0.05% 0.5 MG/G
1 OINTMENT TOPICAL
Qty: 0 | Refills: 0 | DISCHARGE

## 2023-01-17 RX ORDER — ALPRAZOLAM 0.25 MG
0 TABLET ORAL
Qty: 0 | Refills: 0 | DISCHARGE

## 2023-01-17 RX ORDER — ENOXAPARIN SODIUM 100 MG/ML
40 INJECTION SUBCUTANEOUS EVERY 24 HOURS
Refills: 0 | Status: DISCONTINUED | OUTPATIENT
Start: 2023-01-17 | End: 2023-01-26

## 2023-01-17 RX ORDER — FLUTICASONE FUROATE AND VILANTEROL TRIFENATATE 100; 25 UG/1; UG/1
1 POWDER RESPIRATORY (INHALATION)
Qty: 0 | Refills: 0 | DISCHARGE

## 2023-01-17 RX ORDER — ACETAMINOPHEN 500 MG
650 TABLET ORAL ONCE
Refills: 0 | Status: COMPLETED | OUTPATIENT
Start: 2023-01-17 | End: 2023-01-17

## 2023-01-17 RX ORDER — CHOLECALCIFEROL (VITAMIN D3) 125 MCG
1000 CAPSULE ORAL DAILY
Refills: 0 | Status: DISCONTINUED | OUTPATIENT
Start: 2023-01-17 | End: 2023-01-26

## 2023-01-17 RX ORDER — PANTOPRAZOLE SODIUM 20 MG/1
40 TABLET, DELAYED RELEASE ORAL
Refills: 0 | Status: DISCONTINUED | OUTPATIENT
Start: 2023-01-17 | End: 2023-01-26

## 2023-01-17 RX ORDER — LEVALBUTEROL 1.25 MG/.5ML
2 SOLUTION, CONCENTRATE RESPIRATORY (INHALATION)
Qty: 0 | Refills: 0 | DISCHARGE

## 2023-01-17 RX ORDER — MODAFINIL 200 MG/1
100 TABLET ORAL DAILY
Refills: 0 | Status: DISCONTINUED | OUTPATIENT
Start: 2023-01-17 | End: 2023-01-23

## 2023-01-17 RX ORDER — POTASSIUM CHLORIDE 20 MEQ
40 PACKET (EA) ORAL ONCE
Refills: 0 | Status: COMPLETED | OUTPATIENT
Start: 2023-01-17 | End: 2023-01-17

## 2023-01-17 RX ORDER — SODIUM CHLORIDE 9 MG/ML
2800 INJECTION INTRAMUSCULAR; INTRAVENOUS; SUBCUTANEOUS ONCE
Refills: 0 | Status: COMPLETED | OUTPATIENT
Start: 2023-01-17 | End: 2023-01-17

## 2023-01-17 RX ORDER — METOPROLOL TARTRATE 50 MG
25 TABLET ORAL EVERY 12 HOURS
Refills: 0 | Status: DISCONTINUED | OUTPATIENT
Start: 2023-01-17 | End: 2023-01-26

## 2023-01-17 RX ORDER — FLECAINIDE ACETATE 50 MG
100 TABLET ORAL EVERY 12 HOURS
Refills: 0 | Status: DISCONTINUED | OUTPATIENT
Start: 2023-01-17 | End: 2023-01-26

## 2023-01-17 RX ORDER — HYDRALAZINE HCL 50 MG
10 TABLET ORAL EVERY 6 HOURS
Refills: 0 | Status: DISCONTINUED | OUTPATIENT
Start: 2023-01-17 | End: 2023-01-23

## 2023-01-17 RX ORDER — OMEPRAZOLE 10 MG/1
1 CAPSULE, DELAYED RELEASE ORAL
Qty: 0 | Refills: 0 | DISCHARGE

## 2023-01-17 RX ORDER — MODAFINIL 200 MG/1
1 TABLET ORAL
Qty: 0 | Refills: 0 | DISCHARGE

## 2023-01-17 RX ORDER — CEFTRIAXONE 500 MG/1
1000 INJECTION, POWDER, FOR SOLUTION INTRAMUSCULAR; INTRAVENOUS ONCE
Refills: 0 | Status: COMPLETED | OUTPATIENT
Start: 2023-01-17 | End: 2023-01-17

## 2023-01-17 RX ORDER — TAMSULOSIN HYDROCHLORIDE 0.4 MG/1
0.4 CAPSULE ORAL AT BEDTIME
Refills: 0 | Status: DISCONTINUED | OUTPATIENT
Start: 2023-01-17 | End: 2023-01-26

## 2023-01-17 RX ORDER — ALBUTEROL 90 UG/1
2 AEROSOL, METERED ORAL
Qty: 0 | Refills: 0 | DISCHARGE

## 2023-01-17 RX ORDER — ASPIRIN/CALCIUM CARB/MAGNESIUM 324 MG
81 TABLET ORAL DAILY
Refills: 0 | Status: DISCONTINUED | OUTPATIENT
Start: 2023-01-17 | End: 2023-01-26

## 2023-01-17 RX ORDER — LABETALOL HCL 100 MG
10 TABLET ORAL ONCE
Refills: 0 | Status: COMPLETED | OUTPATIENT
Start: 2023-01-17 | End: 2023-01-17

## 2023-01-17 RX ORDER — LANOLIN ALCOHOL/MO/W.PET/CERES
3 CREAM (GRAM) TOPICAL AT BEDTIME
Refills: 0 | Status: DISCONTINUED | OUTPATIENT
Start: 2023-01-17 | End: 2023-01-26

## 2023-01-17 RX ORDER — OMEPRAZOLE 10 MG/1
0 CAPSULE, DELAYED RELEASE ORAL
Qty: 0 | Refills: 0 | DISCHARGE

## 2023-01-17 RX ORDER — HYDRALAZINE HCL 50 MG
10 TABLET ORAL ONCE
Refills: 0 | Status: COMPLETED | OUTPATIENT
Start: 2023-01-17 | End: 2023-01-17

## 2023-01-17 RX ORDER — MIRTAZAPINE 45 MG/1
1 TABLET, ORALLY DISINTEGRATING ORAL
Qty: 0 | Refills: 0 | DISCHARGE

## 2023-01-17 RX ORDER — ALPRAZOLAM 0.25 MG
1 TABLET ORAL
Qty: 0 | Refills: 0 | DISCHARGE

## 2023-01-17 RX ORDER — BUDESONIDE AND FORMOTEROL FUMARATE DIHYDRATE 160; 4.5 UG/1; UG/1
2 AEROSOL RESPIRATORY (INHALATION)
Refills: 0 | Status: DISCONTINUED | OUTPATIENT
Start: 2023-01-17 | End: 2023-01-26

## 2023-01-17 RX ORDER — ATORVASTATIN CALCIUM 80 MG/1
20 TABLET, FILM COATED ORAL AT BEDTIME
Refills: 0 | Status: DISCONTINUED | OUTPATIENT
Start: 2023-01-18 | End: 2023-01-26

## 2023-01-17 RX ORDER — ASPIRIN/CALCIUM CARB/MAGNESIUM 324 MG
1 TABLET ORAL
Qty: 0 | Refills: 0 | DISCHARGE

## 2023-01-17 RX ORDER — MONTELUKAST 4 MG/1
1 TABLET, CHEWABLE ORAL
Qty: 0 | Refills: 0 | DISCHARGE

## 2023-01-17 RX ORDER — ALPRAZOLAM 0.25 MG
0.25 TABLET ORAL EVERY 12 HOURS
Refills: 0 | Status: DISCONTINUED | OUTPATIENT
Start: 2023-01-17 | End: 2023-01-23

## 2023-01-17 RX ORDER — ACETAMINOPHEN 500 MG
650 TABLET ORAL EVERY 6 HOURS
Refills: 0 | Status: DISCONTINUED | OUTPATIENT
Start: 2023-01-17 | End: 2023-01-26

## 2023-01-17 RX ORDER — HYDROCHLOROTHIAZIDE 25 MG
1 TABLET ORAL
Qty: 0 | Refills: 0 | DISCHARGE

## 2023-01-17 RX ORDER — DIGOXIN 250 MCG
1 TABLET ORAL
Qty: 0 | Refills: 0 | DISCHARGE

## 2023-01-17 RX ADMIN — Medication 10 MILLIGRAM(S): at 14:26

## 2023-01-17 RX ADMIN — Medication 40 MILLIEQUIVALENT(S): at 15:06

## 2023-01-17 RX ADMIN — Medication 25 MILLIGRAM(S): at 23:54

## 2023-01-17 RX ADMIN — Medication 650 MILLIGRAM(S): at 22:44

## 2023-01-17 RX ADMIN — Medication 650 MILLIGRAM(S): at 15:07

## 2023-01-17 RX ADMIN — Medication 10 MILLIGRAM(S): at 16:13

## 2023-01-17 RX ADMIN — SODIUM CHLORIDE 2800 MILLILITER(S): 9 INJECTION INTRAMUSCULAR; INTRAVENOUS; SUBCUTANEOUS at 14:09

## 2023-01-17 RX ADMIN — SODIUM CHLORIDE 2800 MILLILITER(S): 9 INJECTION INTRAMUSCULAR; INTRAVENOUS; SUBCUTANEOUS at 14:39

## 2023-01-17 RX ADMIN — Medication 650 MILLIGRAM(S): at 23:29

## 2023-01-17 RX ADMIN — Medication 650 MILLIGRAM(S): at 15:47

## 2023-01-17 RX ADMIN — CEFTRIAXONE 100 MILLIGRAM(S): 500 INJECTION, POWDER, FOR SOLUTION INTRAMUSCULAR; INTRAVENOUS at 16:15

## 2023-01-17 RX ADMIN — TAMSULOSIN HYDROCHLORIDE 0.4 MILLIGRAM(S): 0.4 CAPSULE ORAL at 23:54

## 2023-01-17 RX ADMIN — CEFTRIAXONE 1000 MILLIGRAM(S): 500 INJECTION, POWDER, FOR SOLUTION INTRAMUSCULAR; INTRAVENOUS at 16:50

## 2023-01-17 NOTE — PATIENT PROFILE ADULT - FALL HARM RISK - HARM RISK INTERVENTIONS

## 2023-01-17 NOTE — ED ADULT NURSE NOTE - OBJECTIVE STATEMENT
· HPI Objective Statement: 83 y/o M with PMH HTN, a fib, HLD, CVA presents to ED from the [facility] for AMS, HTN and fever. Pt states he feels "off". Denies HA, chest pain, SOB, nausea vomiting. States "they told me my BP was high". As per RN pt c.o R knee pain. Pt states he fell a few days ago. Found to be febrile 101 rectally in ED. Pt poor historian and unable to add much to history. 81 y/o M  presents to ED from the [facility] for AMS, HTN and fever. Pt states he feels "off". Denies HA, chest pain, SOB, nausea vomiting. States "they told me my BP was high". pt c.o R knee pain. Pt states he fell a few days ago. Found to be febrile 101 rectally in ED. Pt poor historian and unable to add much to history.

## 2023-01-17 NOTE — H&P ADULT - PROBLEM SELECTOR PLAN 2
Patient received IV hydralazine 10mg and IV labetalol 10mg in ER  Unclear what BP meds patient was on in the past  Will start metoprolol 50mg q12h  Hydralazine 10mg ivp q6h prn SBP > 180  Check ECHO  Cardio consult -- Dr. Corrales  Further work-up/management pending clinical course. Patient received IV hydralazine 10mg and IV labetalol 10mg in ER  Unclear what BP meds patient was on in the past  Will start metoprolol 25mg q12h  Hydralazine 10mg ivp q6h prn SBP > 180  Hold HCTZ b/c of fever  Check ECHO  Cardio consult -- Dr. Corrales  Further work-up/management pending clinical course.

## 2023-01-17 NOTE — H&P ADULT - PROBLEM SELECTOR PLAN 3
PAF with h/o SVT  On Flecainide -- to continue  Not on AC  Continue ASA  Monitor on tele  Cardio consult   Further work-up/management pending clinical course.

## 2023-01-17 NOTE — ED PROVIDER NOTE - NS ED ATTENDING STATEMENT MOD
This was a shared visit with the WILLIAM. I reviewed and verified the documentation and independently performed the documented:

## 2023-01-17 NOTE — H&P ADULT - PROBLEM SELECTOR PLAN 1
Fever with leukocytosis -- unclear etiology  Admit  Patient received IV Rocephin x 1 in ER, will continue for now pending ID recommendations  Pan-culture and f/u culture data  Trend WBC  Check procalcitonin level  ID consult  Further work-up/management pending clinical course.

## 2023-01-17 NOTE — ED ADULT NURSE NOTE - NSIMPLEMENTINTERV_GEN_ALL_ED
Implemented All Fall Risk Interventions:  [place] to call system. Call bell, personal items and telephone within reach. Instruct patient to call for assistance. Room bathroom lighting operational. Non-slip footwear when patient is off stretcher. Physically safe environment: no spills, clutter or unnecessary equipment. Stretcher in lowest position, wheels locked, appropriate side rails in place. Provide visual cue, wrist band, yellow gown, etc. Monitor gait and stability. Monitor for mental status changes and reorient to person, place, and time. Review medications for side effects contributing to fall risk. Reinforce activity limits and safety measures with patient and family.

## 2023-01-17 NOTE — ED PROVIDER NOTE - PHYSICAL EXAMINATION
PE:   GEN: lethargic, confuse in mild distress  HEAD: Atraumatic  EYES: Sclera white, conjunctiva pink, PERRLA  CARDIAC: Reg rate and rhythm, S1,S2, no murmur/rub/gallop. Strong central and peripheral pulses, Brisk cap refill, no evident pedal edema.   RESP: No distress noted. L/S clear = Bilat without accessory muscle use, wheeze, rhonchi, rales.   ABD: soft, supple, non-tender, no guarding. BS x 4, normoactive.   NEURO: AOx2, CN II-XII grossly intact without focal deficit.   MSK: Moving all extremities with no apparent deformities.   SKIN: Warm, dry, normal color, without apparent rashes.

## 2023-01-17 NOTE — ED PROVIDER NOTE - CLINICAL SUMMARY MEDICAL DECISION MAKING FREE TEXT BOX
Patient is an 82-year-old male who presents to the emergency room with reported concern for UTI and elevated blood pressure.  Past medical history of hypertension, history of A. fib, hyperlipidemia, CVA.  Patient presents from [place] for altered mental status hypertension and fever.  EMS reports that the facility has suspected a UTI because patient had increased urinary frequency.  Patient reports that he feels "off" but cannot provide further history.  Denies headache nausea vomiting chest pain shortness of breath or abdominal pain.  Patient does report that he suffered a fall few days ago and he has been experiencing some right knee pain since.  Was initially found to be febrile in the ER with a rectal temp of 101.  On exam patient is lying in bed mildly confused awake and oriented to person and place but not time or present events.  Normocephalic atraumatic pupils are equal round reactive hearts regular rate lungs are clear to auscultation abdomen is soft nontender nondistended.  Patient is moving all extremities although reports some tenderness to palpations of the medial superior aspect of the right knee.  There is no overlying cellulitis or increased warmth and patient able to range the knee.  No midline C-T-L tenderness to palpation.  No skin rashes noted.  No meningeal signs noted.  Patient's presenting to the emergency room with fever urinary frequency and hypertension concern for possible infectious process.  Will control blood pressure and medicate for fever obtain screening septic work-up check UA urine culture begin antibiotics as needed obtain CT imaging of the head abdomen pelvis.  And will monitor.  Patient will likely require admission.  Independent review of EKG reveals a normal sinus with right bundle branch.  Rate of 86.  CT imaging reveals no intracranial hemorrhage concern for possible acute sinusitis CT renal stone heart reveals a chronic left UPJ obstruction with moderate hydro unchanged from previous.  Labs noted patient with an elevated white counts 2% bands.  Mild hypokalemia will supplement normal lactate.  Viral panel negative.  Independent review of chest x-ray reveals no focal infiltrate.  X-ray imaging reveals no acute fracture of the knee may be a small effusion.  This time no active source was noted patient with overall improvements more alert and interactive.  Will admit for fever of unknown  origin and continued antibiotics.  Daughter was at bedside updated on plan of care.  BP controlled at this time.

## 2023-01-17 NOTE — ED PROVIDER NOTE - OBJECTIVE STATEMENT
83 y/o M with PMH HTN, a fib, HLD, CVA presents to ED from the [facility] for AMS, HTN and fever. Pt states he feels "off". Denies HA, chest pain, SOB, nausea vomiting. States "they told me my BP was high". As per RN pt c.o R knee pain. Pt states he fell a few days ago. Found to be febrile 101 rectally in ED. Pt poor historian and unable to add much to history. 83 y/o M with PMH HTN, a fib, HLD, CVA presents to ED from the [facility] for AMS, HTN and fever. Pt states he feels "off". Denies HA, chest pain, SOB, nausea vomiting. States "they told me my BP was high". As per RN pt c.o R knee pain. Pt states he fell a few days ago. Found to be febrile 101 rectally in ED. Pt poor historian and unable to add much to history.    PCP: Dr Harvey 81 y/o M with PMH HTN, a fib, HLD, CVA presents to ED from the [facility] for AMS, HTN and fever. Per EMS suspected UTI as her has been urinating more frequently. Pt states he feels "off". Denies HA, chest pain, SOB, nausea vomiting. States "they told me my BP was high". As per RN pt c.o R knee pain. Pt states he fell a few days ago. Found to be febrile 101 rectally in ED. Pt poor historian and unable to add much to history.    PCP: Dr Harvey

## 2023-01-17 NOTE — PATIENT PROFILE ADULT - NSPROPOAPRESSUREINJURYCT_GEN_A_NUR
Letter sent to inform Pt of this result.  
Normal mammogram.  Repeat in 1 year.     DXA scan shows osteopenia.  She needs to start calcium with vitamin D twice daily.  Repeat in 2 years.  
1

## 2023-01-17 NOTE — ED PROVIDER NOTE - DIFFERENTIAL DIAGNOSIS
includes but not limited to: ICH, stroke, dehydration, electrolyte disturbance, infection including but not limited to UTI/ PNA/ sepsis, Differential Diagnosis

## 2023-01-17 NOTE — ED ADULT NURSE NOTE - NURSING MUSC EXTREMITY LIMITED ROM
"DAILY PROGRESS NOTE  [facility]    Patient Identification:  Name: Filippo Wheeler  Age: 68 y.o.  Sex: male  :  1953  MRN: [id number]         Primary Care Physician: Joshua Corrales MD    Subjective:  Interval History:He feels better.    Objective:    Scheduled Meds:allopurinol, 300 mg, Oral, Daily  atorvastatin, 40 mg, Oral, Daily  castor oil-balsam peru, 1 application, Topical, Q12H  furosemide, 40 mg, Intravenous, Q12H  insulin lispro, 0-14 Units, Subcutaneous, TID AC  losartan-HCTZ (HYZAAR) 100-25 combo dose, , Oral, Daily  metoprolol succinate XL, 100 mg, Oral, Q24H  pantoprazole, 40 mg, Oral, QAM  piperacillin-tazobactam, 3.375 g, Intravenous, Q8H  potassium chloride, 20 mEq, Oral, BID With Meals  senna-docusate sodium, 2 tablet, Oral, BID  sodium chloride, 10 mL, Intravenous, Q12H  vancomycin, 750 mg, Intravenous, Q12H      Continuous Infusions:Pharmacy to dose vancomycin,         Vital signs in last 24 hours:  Temp:  [97.9 °F (36.6 °C)-98.4 °F (36.9 °C)] 98 °F (36.7 °C)  Heart Rate:  [71-89] 89  Resp:  [16-18] 18  BP: (101-176)/() 101/61    Intake/Output:    Intake/Output Summary (Last 24 hours) at 3/10/2022 1158  Last data filed at 3/10/2022 0431  Gross per 24 hour   Intake 500 ml   Output 2000 ml   Net -1500 ml       Exam:  /61 (BP Location: Right arm, Patient Position: Lying)   Pulse 89   Temp 98 °F (36.7 °C) (Oral)   Resp 18   Ht 165.1 cm (65\")   Wt 93.7 kg (206 lb 9.6 oz)   SpO2 97%   BMI 34.38 kg/m²     General Appearance:    Alert, cooperative, no distress   Head:    Normocephalic, without obvious abnormality, atraumatic   Eyes:       Throat:   Lips, tongue, gums normal   Neck:   Supple, symmetrical, trachea midline, no JVD   Lungs:     Clear to auscultation bilaterally, respirations unlabored   Chest Wall:    No tenderness or deformity    Heart:    Regular rate and rhythm, S1 and S2 normal, no murmur,no  Rub or gallop   Abdomen:     Soft, nontender, bowel " sounds active, no masses, no organomegaly    Extremities:   Extremities normal, atraumatic, no cyanosis or edema   Pulses:      Skin:   Skin is warm and dry,  Toe infection   Neurologic:   no focal deficits noted      Lab Results (last 72 hours)     Procedure Component Value Units Date/Time    POC Glucose Once [[id number]]  (Normal) Collected: 03/10/22 1101    Specimen: Blood Updated: 03/10/22 1102     Glucose 80 mg/dL      Comment: Meter: [id number] : [id number] Rainer CASTANEDA       Wound Culture - Wound, Toe, Left [[id number]] Collected: 03/08/22 1631    Specimen: Wound from Toe, Left Updated: 03/10/22 0813     Wound Culture Heavy growth (4+) Normal Skin Sushila     Gram Stain No WBCs or organisms seen    POC Glucose Once [[id number]]  (Abnormal) Collected: 03/10/22 0626    Specimen: Blood Updated: 03/10/22 0628     Glucose 308 mg/dL      Comment: Meter: [id number] : [id number] Meek CASTANEDA       Basic Metabolic Panel [[id number]]  (Abnormal) Collected: 03/10/22 0429    Specimen: Blood Updated: 03/10/22 0622     Glucose 158 mg/dL      BUN 12 mg/dL      Creatinine 1.08 mg/dL      Sodium 126 mmol/L      Potassium 3.7 mmol/L      Chloride 89 mmol/L      CO2 25.0 mmol/L      Calcium 9.6 mg/dL      BUN/Creatinine Ratio 11.1     Anion Gap 12.0 mmol/L      eGFR 74.3 mL/min/1.73      Comment: National Kidney Foundation and American Society of Nephrology (ASN) Task Force recommended calculation based on the Chronic Kidney Disease Epidemiology Collaboration (CKD-EPI) equation refit without adjustment for race.       Narrative:      GFR Normal >60  Chronic Kidney Disease <60  Kidney Failure <15      CBC & Differential [[id number]]  (Abnormal) Collected: 03/10/22 0429    Specimen: Blood Updated: 03/10/22 0445    Narrative:      The following orders were created for panel order CBC & Differential.  Procedure                               Abnormality         Status                     ---------                                -----------         ------                     CBC Auto Differential[[id number]]        Abnormal            Final result                 Please view results for these tests on the individual orders.    CBC Auto Differential [[id number]]  (Abnormal) Collected: 03/10/22 0429    Specimen: Blood Updated: 03/10/22 0445     WBC 2.66 10*3/mm3      RBC 4.34 10*6/mm3      Hemoglobin 14.4 g/dL      Hematocrit 42.3 %      MCV 97.5 fL      MCH 33.2 pg      MCHC 34.0 g/dL      RDW 12.9 %      RDW-SD 45.9 fl      MPV 8.7 fL      Platelets 133 10*3/mm3      Neutrophil % 55.2 %      Lymphocyte % 25.2 %      Monocyte % 17.7 %      Eosinophil % 1.1 %      Basophil % 0.4 %      Immature Grans % 0.4 %      Neutrophils, Absolute 1.47 10*3/mm3      Lymphocytes, Absolute 0.67 10*3/mm3      Monocytes, Absolute 0.47 10*3/mm3      Eosinophils, Absolute 0.03 10*3/mm3      Basophils, Absolute 0.01 10*3/mm3      Immature Grans, Absolute 0.01 10*3/mm3      nRBC 0.0 /100 WBC     POC Glucose Once [[id number]]  (Abnormal) Collected: 03/09/22 2032    Specimen: Blood Updated: 03/09/22 2035     Glucose 140 mg/dL      Comment: Meter: [id number] : [id number] Verna CASTANEDA       POC Glucose Once [[id number]]  (Abnormal) Collected: 03/09/22 1556    Specimen: Blood Updated: 03/09/22 1557     Glucose 153 mg/dL      Comment: Meter: [id number] : [id number] David Michaud CNA       Blood Culture - Blood, Arm, Left [[id number]]  (Normal) Collected: 03/08/22 1238    Specimen: Blood from Arm, Left Updated: 03/09/22 1247     Blood Culture No growth at 24 hours    Blood Culture - Blood, Arm, Left [[id number]]  (Normal) Collected: 03/08/22 1238    Specimen: Blood from Arm, Left Updated: 03/09/22 1247     Blood Culture No growth at 24 hours    POC Glucose Once [[id number]]  (Abnormal) Collected: 03/09/22 1102    Specimen: Blood Updated: 03/09/22 1103     Glucose 131 mg/dL      Comment: Meter: [id number] : [id number] David Michaud CNA       POC Glucose Once  [[id number]]  (Abnormal) Collected: 03/09/22 0641    Specimen: Blood Updated: 03/09/22 0643     Glucose 143 mg/dL      Comment: Meter: [id number] : [id number] Cedric CASTANEDA       Basic Metabolic Panel [[id number]]  (Abnormal) Collected: 03/09/22 0455    Specimen: Blood Updated: 03/09/22 0528     Glucose 131 mg/dL      BUN 12 mg/dL      Creatinine 1.00 mg/dL      Sodium 133 mmol/L      Potassium 4.0 mmol/L      Chloride 96 mmol/L      CO2 25.0 mmol/L      Calcium 9.3 mg/dL      BUN/Creatinine Ratio 12.0     Anion Gap 12.0 mmol/L      eGFR 81.5 mL/min/1.73      Comment: National Kidney Foundation and American Society of Nephrology (ASN) Task Force recommended calculation based on the Chronic Kidney Disease Epidemiology Collaboration (CKD-EPI) equation refit without adjustment for race.       Narrative:      GFR Normal >60  Chronic Kidney Disease <60  Kidney Failure <15      CBC & Differential [[id number]]  (Abnormal) Collected: 03/09/22 0455    Specimen: Blood Updated: 03/09/22 0507    Narrative:      The following orders were created for panel order CBC & Differential.  Procedure                               Abnormality         Status                     ---------                               -----------         ------                     CBC Auto Differential[[id number]]        Abnormal            Final result                 Please view results for these tests on the individual orders.    CBC Auto Differential [[id number]]  (Abnormal) Collected: 03/09/22 0455    Specimen: Blood Updated: 03/09/22 0507     WBC 2.18 10*3/mm3      RBC 4.03 10*6/mm3      Hemoglobin 13.5 g/dL      Hematocrit 39.4 %      MCV 97.8 fL      MCH 33.5 pg      MCHC 34.3 g/dL      RDW 13.5 %      RDW-SD 48.6 fl      MPV 8.9 fL      Platelets 122 10*3/mm3      Neutrophil % 70.1 %      Lymphocyte % 15.6 %      Monocyte % 12.4 %      Eosinophil % 0.9 %      Basophil % 0.5 %      Immature Grans % 0.5 %      Neutrophils, Absolute 1.53 10*3/mm3       Lymphocytes, Absolute 0.34 10*3/mm3      Monocytes, Absolute 0.27 10*3/mm3      Eosinophils, Absolute 0.02 10*3/mm3      Basophils, Absolute 0.01 10*3/mm3      Immature Grans, Absolute 0.01 10*3/mm3      nRBC 0.0 /100 WBC     POC Glucose Once [[id number]]  (Abnormal) Collected: 03/08/22 2124    Specimen: Blood Updated: 03/08/22 2139     Glucose 157 mg/dL      Comment: Meter: [id number] : [id number] Cedric CASTANEDA       Basic Metabolic Panel [[id number]]  (Abnormal) Collected: 03/08/22 1652    Specimen: Blood Updated: 03/08/22 1720     Glucose 130 mg/dL      BUN 11 mg/dL      Creatinine 0.86 mg/dL      Sodium 133 mmol/L      Potassium 4.3 mmol/L      Chloride 95 mmol/L      CO2 26.0 mmol/L      Calcium 9.7 mg/dL      BUN/Creatinine Ratio 12.8     Anion Gap 12.0 mmol/L      eGFR 93.7 mL/min/1.73      Comment: National Kidney Foundation and American Society of Nephrology (ASN) Task Force recommended calculation based on the Chronic Kidney Disease Epidemiology Collaboration (CKD-EPI) equation refit without adjustment for race.       Narrative:      GFR Normal >60  Chronic Kidney Disease <60  Kidney Failure <15      Lactic Acid, Plasma [[id number]]  (Normal) Collected: 03/08/22 1652    Specimen: Blood Updated: 03/08/22 1719     Lactate 1.4 mmol/L     CBC & Differential [[id number]]  (Abnormal) Collected: 03/08/22 1652    Specimen: Blood Updated: 03/08/22 1706    Narrative:      The following orders were created for panel order CBC & Differential.  Procedure                               Abnormality         Status                     ---------                               -----------         ------                     CBC Auto Differential[[id number]]        Abnormal            Final result                 Please view results for these tests on the individual orders.    CBC Auto Differential [[id number]]  (Abnormal) Collected: 03/08/22 1652    Specimen: Blood Updated: 03/08/22 1706     WBC 4.20 10*3/mm3      RBC  4.40 10*6/mm3      Hemoglobin 14.9 g/dL      Hematocrit 41.9 %      MCV 95.2 fL      MCH 33.9 pg      MCHC 35.6 g/dL      RDW 13.3 %      RDW-SD 46.7 fl      MPV 8.9 fL      Platelets 153 10*3/mm3      Neutrophil % 56.4 %      Lymphocyte % 25.5 %      Monocyte % 16.4 %      Eosinophil % 1.0 %      Basophil % 0.5 %      Immature Grans % 0.2 %      Neutrophils, Absolute 2.37 10*3/mm3      Lymphocytes, Absolute 1.07 10*3/mm3      Monocytes, Absolute 0.69 10*3/mm3      Eosinophils, Absolute 0.04 10*3/mm3      Basophils, Absolute 0.02 10*3/mm3      Immature Grans, Absolute 0.01 10*3/mm3      nRBC 0.0 /100 WBC     Hemoglobin A1c [[id number]]  (Abnormal) Collected: 03/08/22 1211    Specimen: Blood Updated: 03/08/22 1632     Hemoglobin A1C 7.00 %     Narrative:      Hemoglobin A1C Ranges:    Increased Risk for Diabetes  5.7% to 6.4%  Diabetes                     >= 6.5%  Diabetic Goal                < 7.0%    BNP [[id number]]  (Abnormal) Collected: 03/08/22 1211    Specimen: Blood Updated: 03/08/22 1618     proBNP 930.0 pg/mL     Narrative:      Among patients with dyspnea, NT-proBNP is highly sensitive for the detection of acute congestive heart failure. In addition NT-proBNP of <300 pg/ml effectively rules out acute congestive heart failure with 99% negative predictive value.    Results may be falsely decreased if patient taking Biotin.      POC Glucose Once [[id number]]  (Normal) Collected: 03/08/22 1558    Specimen: Blood Updated: 03/08/22 1615     Glucose 113 mg/dL      Comment: Meter: [id number] : [id number] Fernandez CASTANEDA       Magnesium [[id number]]  (Normal) Collected: 03/08/22 1211    Specimen: Blood Updated: 03/08/22 1614     Magnesium 2.0 mg/dL     COVID PRE-OP / PRE-PROCEDURE SCREENING ORDER (NO ISOLATION) - Swab, Nasopharynx [[id number]]  (Normal) Collected: 03/08/22 1406    Specimen: Swab from Nasopharynx Updated: 03/08/22 1438    Narrative:      The following orders were created for panel order COVID PRE-OP  / PRE-PROCEDURE SCREENING ORDER (NO ISOLATION) - Swab, Nasopharynx.  Procedure                               Abnormality         Status                     ---------                               -----------         ------                     COVID-19,[facility]...[[id number]]  Normal              Final result                 Please view results for these tests on the individual orders.    COVID-19,[facility] CEPHEID/MARGARET NP SWAB IN TRANSPORT MEDIA 8-12 HR TAT - Swab, Nasopharynx [[id number]]  (Normal) Collected: 03/08/22 1406    Specimen: Swab from Nasopharynx Updated: 03/08/22 1438     COVID19 Not Detected    Narrative:      Fact sheet for providers: https://www.fda.gov/media/819657/download    Fact sheet for patients: https://www.fda.gov/media/745959/download    Test performed by PCR.    Comprehensive Metabolic Panel [[id number]]  (Abnormal) Collected: 03/08/22 1211    Specimen: Blood Updated: 03/08/22 1258     Glucose 197 mg/dL      BUN 11 mg/dL      Creatinine 1.09 mg/dL      Sodium 130 mmol/L      Potassium 4.8 mmol/L      Comment: Slight hemolysis detected by analyzer. Results may be affected.        Chloride 94 mmol/L      CO2 23.0 mmol/L      Calcium 9.5 mg/dL      Total Protein 7.9 g/dL      Albumin 4.20 g/dL      ALT (SGPT) 15 U/L      AST (SGOT) 30 U/L      Alkaline Phosphatase 130 U/L      Total Bilirubin 0.6 mg/dL      Globulin 3.7 gm/dL      A/G Ratio 1.1 g/dL      BUN/Creatinine Ratio 10.1     Anion Gap 13.0 mmol/L      eGFR 73.5 mL/min/1.73      Comment: National Kidney Foundation and American Society of Nephrology (ASN) Task Force recommended calculation based on the Chronic Kidney Disease Epidemiology Collaboration (CKD-EPI) equation refit without adjustment for race.       Narrative:      GFR Normal >60  Chronic Kidney Disease <60  Kidney Failure <15      Sedimentation Rate [[id number]]  (Abnormal) Collected: 03/08/22 1211    Specimen: Blood Updated: 03/08/22 1237     Sed Rate 40 mm/hr      CBC & Differential [[id number]]  (Abnormal) Collected: 03/08/22 1211    Specimen: Blood Updated: 03/08/22 1234    Narrative:      The following orders were created for panel order CBC & Differential.  Procedure                               Abnormality         Status                     ---------                               -----------         ------                     CBC Auto Differential[[id number]]        Abnormal            Final result                 Please view results for these tests on the individual orders.    CBC Auto Differential [[id number]]  (Abnormal) Collected: 03/08/22 1211    Specimen: Blood Updated: 03/08/22 1234     WBC 3.13 10*3/mm3      RBC 4.15 10*6/mm3      Hemoglobin 13.9 g/dL      Hematocrit 40.0 %      MCV 96.4 fL      MCH 33.5 pg      MCHC 34.8 g/dL      RDW 12.9 %      RDW-SD 45.6 fl      MPV 8.9 fL      Platelets 151 10*3/mm3      Neutrophil % 56.8 %      Lymphocyte % 27.5 %      Monocyte % 14.4 %      Eosinophil % 1.0 %      Basophil % 0.3 %      Immature Grans % 0.0 %      Neutrophils, Absolute 1.78 10*3/mm3      Lymphocytes, Absolute 0.86 10*3/mm3      Monocytes, Absolute 0.45 10*3/mm3      Eosinophils, Absolute 0.03 10*3/mm3      Basophils, Absolute 0.01 10*3/mm3      Immature Grans, Absolute 0.00 10*3/mm3      nRBC 0.0 /100 WBC         Data Review:  Results from last 7 days   Lab Units 03/10/22  0429 03/09/22  0455 03/08/22  1652   SODIUM mmol/L 126* 133* 133*   POTASSIUM mmol/L 3.7 4.0 4.3   CHLORIDE mmol/L 89* 96* 95*   CO2 mmol/L 25.0 25.0 26.0   BUN mg/dL 12 12 11   CREATININE mg/dL 1.08 1.00 0.86   GLUCOSE mg/dL 158* 131* 130*   CALCIUM mg/dL 9.6 9.3 9.7     Results from last 7 days   Lab Units 03/10/22  0429 03/09/22  0455 03/08/22  1652   WBC 10*3/mm3 2.66* 2.18* 4.20   HEMOGLOBIN g/dL 14.4 13.5 14.9   HEMATOCRIT % 42.3 39.4 41.9   PLATELETS 10*3/mm3 133* 122* 153         Results from last 7 days   Lab Units 03/08/22  1211   HEMOGLOBIN A1C % 7.00*     Lab Results   Lab  Value Date/Time    TROPONINT <0.010 10/25/2018 0057    TROPONINT 0.013 04/25/2018 2314    TROPONINT <0.010 06/08/2014 1135         Results from last 7 days   Lab Units 03/08/22  1211   ALK PHOS U/L 130*   BILIRUBIN mg/dL 0.6   ALT (SGPT) U/L 15   AST (SGOT) U/L 30         Results from last 7 days   Lab Units 03/08/22  1211   HEMOGLOBIN A1C % 7.00*     Glucose   Date/Time Value Ref Range Status   03/10/2022 1101 80 70 - 130 mg/dL Final     Comment:     Meter: [id number] : [id number] Spear Mary NA   03/10/2022 0626 308 (H) 70 - 130 mg/dL Final     Comment:     Meter: [id number] : [id number] Meeklissa Rodriguez NA   03/09/2022 2032 140 (H) 70 - 130 mg/dL Final     Comment:     Meter: [id number] : [id number] Verna Laguna NA   03/09/2022 1556 153 (H) 70 - 130 mg/dL Final     Comment:     Meter: [id number] : [id number] David Michaud CNA   03/09/2022 1102 131 (H) 70 - 130 mg/dL Final     Comment:     Meter: [id number] : [id number] David Michaud CNA   03/09/2022 0641 143 (H) 70 - 130 mg/dL Final     Comment:     Meter: [id number] : [id number] Cedric Hastings NA   03/08/2022 2124 157 (H) 70 - 130 mg/dL Final     Comment:     Meter: [id number] : [id number] Cedric Darling NA   03/08/2022 1558 113 70 - 130 mg/dL Final     Comment:     Meter: [id number] : [id number] Fernandez CASTANEDA           Past Medical History:   Diagnosis Date   • A-fib (HCC)    • Acid reflux    • Colon polyp    • Diabetes (HCC)    • Hypertension    • Osteoarthritis        Assessment:  Active Hospital Problems    Diagnosis  POA   • **Diabetic ulcer of left foot (HCC) [E11.621, L97.529]  Yes   • Ischemic toe [I99.8]  Yes   • Cellulitis of both lower extremities [L03.115, L03.116]  Yes   • Localized edema [R60.0]  Yes   • Type 2 diabetes mellitus (HCC) [E11.9]  Yes   • Hyponatremia [E87.1]  Yes   • Essential hypertension [I10]  Yes   • Atrial fibrillation (HCC) [I48.91]  Yes   • Peripheral vascular disease (HCC) [I73.9]  Yes   •  GERD without esophagitis [K21.9]  Yes   • Leukopenia [D72.819]  Yes      Resolved Hospital Problems   No resolved problems to display.       Plan:  Continue with antibiotics and await wound care.    Jae Alvarez MD  3/10/2022  11:58 EST     right lower extremity

## 2023-01-17 NOTE — ED PROVIDER NOTE - PROGRESS NOTE DETAILS
Reevaluated patient at bedside.  Patient feeling much improved.  Now awake alert oriented. Discussed the results of all diagnostic testing in ED with patient and daughter.    An opportunity to ask questions was given. Advised admission for IV abx, ID concetta. Agreeable with plan.

## 2023-01-18 DIAGNOSIS — R41.82 ALTERED MENTAL STATUS, UNSPECIFIED: ICD-10-CM

## 2023-01-18 LAB
ALBUMIN SERPL ELPH-MCNC: 2.6 G/DL — LOW (ref 3.3–5)
ALP SERPL-CCNC: 60 U/L — SIGNIFICANT CHANGE UP (ref 30–120)
ALT FLD-CCNC: <10 U/L DA — LOW (ref 10–60)
ANION GAP SERPL CALC-SCNC: 11 MMOL/L — SIGNIFICANT CHANGE UP (ref 5–17)
AST SERPL-CCNC: 16 U/L — SIGNIFICANT CHANGE UP (ref 10–40)
BASOPHILS # BLD AUTO: 0.05 K/UL — SIGNIFICANT CHANGE UP (ref 0–0.2)
BASOPHILS NFR BLD AUTO: 0.3 % — SIGNIFICANT CHANGE UP (ref 0–2)
BILIRUB SERPL-MCNC: 0.8 MG/DL — SIGNIFICANT CHANGE UP (ref 0.2–1.2)
BUN SERPL-MCNC: 21 MG/DL — SIGNIFICANT CHANGE UP (ref 7–23)
CALCIUM SERPL-MCNC: 8.7 MG/DL — SIGNIFICANT CHANGE UP (ref 8.4–10.5)
CHLORIDE SERPL-SCNC: 107 MMOL/L — SIGNIFICANT CHANGE UP (ref 96–108)
CO2 SERPL-SCNC: 25 MMOL/L — SIGNIFICANT CHANGE UP (ref 22–31)
CREAT SERPL-MCNC: 1.08 MG/DL — SIGNIFICANT CHANGE UP (ref 0.5–1.3)
CULTURE RESULTS: NO GROWTH — SIGNIFICANT CHANGE UP
EGFR: 68 ML/MIN/1.73M2 — SIGNIFICANT CHANGE UP
EOSINOPHIL # BLD AUTO: 0 K/UL — SIGNIFICANT CHANGE UP (ref 0–0.5)
EOSINOPHIL NFR BLD AUTO: 0 % — SIGNIFICANT CHANGE UP (ref 0–6)
GLUCOSE SERPL-MCNC: 114 MG/DL — HIGH (ref 70–99)
HCT VFR BLD CALC: 42.9 % — SIGNIFICANT CHANGE UP (ref 39–50)
HGB BLD-MCNC: 14.3 G/DL — SIGNIFICANT CHANGE UP (ref 13–17)
IMM GRANULOCYTES NFR BLD AUTO: 0.6 % — SIGNIFICANT CHANGE UP (ref 0–0.9)
LYMPHOCYTES # BLD AUTO: 1.41 K/UL — SIGNIFICANT CHANGE UP (ref 1–3.3)
LYMPHOCYTES # BLD AUTO: 9.6 % — LOW (ref 13–44)
MAGNESIUM SERPL-MCNC: 2.2 MG/DL — SIGNIFICANT CHANGE UP (ref 1.6–2.6)
MCHC RBC-ENTMCNC: 29.7 PG — SIGNIFICANT CHANGE UP (ref 27–34)
MCHC RBC-ENTMCNC: 33.3 GM/DL — SIGNIFICANT CHANGE UP (ref 32–36)
MCV RBC AUTO: 89 FL — SIGNIFICANT CHANGE UP (ref 80–100)
MONOCYTES # BLD AUTO: 1.62 K/UL — HIGH (ref 0–0.9)
MONOCYTES NFR BLD AUTO: 11 % — SIGNIFICANT CHANGE UP (ref 2–14)
NEUTROPHILS # BLD AUTO: 11.5 K/UL — HIGH (ref 1.8–7.4)
NEUTROPHILS NFR BLD AUTO: 78.5 % — HIGH (ref 43–77)
NRBC # BLD: 0 /100 WBCS — SIGNIFICANT CHANGE UP (ref 0–0)
PLATELET # BLD AUTO: 262 K/UL — SIGNIFICANT CHANGE UP (ref 150–400)
POTASSIUM SERPL-MCNC: 3.5 MMOL/L — SIGNIFICANT CHANGE UP (ref 3.5–5.3)
POTASSIUM SERPL-SCNC: 3.5 MMOL/L — SIGNIFICANT CHANGE UP (ref 3.5–5.3)
PROCALCITONIN SERPL-MCNC: 0.21 NG/ML — HIGH (ref 0.02–0.1)
PROT SERPL-MCNC: 6.9 G/DL — SIGNIFICANT CHANGE UP (ref 6–8.3)
RBC # BLD: 4.82 M/UL — SIGNIFICANT CHANGE UP (ref 4.2–5.8)
RBC # FLD: 13.5 % — SIGNIFICANT CHANGE UP (ref 10.3–14.5)
SODIUM SERPL-SCNC: 143 MMOL/L — SIGNIFICANT CHANGE UP (ref 135–145)
SPECIMEN SOURCE: SIGNIFICANT CHANGE UP
TSH SERPL-MCNC: 2.11 UIU/ML — SIGNIFICANT CHANGE UP (ref 0.27–4.2)
WBC # BLD: 14.67 K/UL — HIGH (ref 3.8–10.5)
WBC # FLD AUTO: 14.67 K/UL — HIGH (ref 3.8–10.5)

## 2023-01-18 PROCEDURE — 93306 TTE W/DOPPLER COMPLETE: CPT | Mod: 26

## 2023-01-18 PROCEDURE — 99233 SBSQ HOSP IP/OBS HIGH 50: CPT

## 2023-01-18 RX ORDER — CEFTRIAXONE 500 MG/1
1000 INJECTION, POWDER, FOR SOLUTION INTRAMUSCULAR; INTRAVENOUS EVERY 24 HOURS
Refills: 0 | Status: DISCONTINUED | OUTPATIENT
Start: 2023-01-18 | End: 2023-01-20

## 2023-01-18 RX ORDER — IBUPROFEN 200 MG
400 TABLET ORAL EVERY 4 HOURS
Refills: 0 | Status: COMPLETED | OUTPATIENT
Start: 2023-01-18 | End: 2023-01-19

## 2023-01-18 RX ADMIN — PANTOPRAZOLE SODIUM 40 MILLIGRAM(S): 20 TABLET, DELAYED RELEASE ORAL at 05:40

## 2023-01-18 RX ADMIN — Medication 650 MILLIGRAM(S): at 12:04

## 2023-01-18 RX ADMIN — CEFTRIAXONE 100 MILLIGRAM(S): 500 INJECTION, POWDER, FOR SOLUTION INTRAMUSCULAR; INTRAVENOUS at 09:55

## 2023-01-18 RX ADMIN — Medication 25 MILLIGRAM(S): at 11:04

## 2023-01-18 RX ADMIN — BUDESONIDE AND FORMOTEROL FUMARATE DIHYDRATE 2 PUFF(S): 160; 4.5 AEROSOL RESPIRATORY (INHALATION) at 05:40

## 2023-01-18 RX ADMIN — Medication 400 MILLIGRAM(S): at 19:59

## 2023-01-18 RX ADMIN — Medication 650 MILLIGRAM(S): at 11:04

## 2023-01-18 RX ADMIN — ATORVASTATIN CALCIUM 20 MILLIGRAM(S): 80 TABLET, FILM COATED ORAL at 22:08

## 2023-01-18 RX ADMIN — Medication 1000 UNIT(S): at 11:03

## 2023-01-18 RX ADMIN — TAMSULOSIN HYDROCHLORIDE 0.4 MILLIGRAM(S): 0.4 CAPSULE ORAL at 22:11

## 2023-01-18 RX ADMIN — MIRTAZAPINE 7.5 MILLIGRAM(S): 45 TABLET, ORALLY DISINTEGRATING ORAL at 11:03

## 2023-01-18 RX ADMIN — Medication 400 MILLIGRAM(S): at 19:29

## 2023-01-18 RX ADMIN — Medication 100 MILLIGRAM(S): at 05:40

## 2023-01-18 RX ADMIN — Medication 81 MILLIGRAM(S): at 11:03

## 2023-01-18 RX ADMIN — ENOXAPARIN SODIUM 40 MILLIGRAM(S): 100 INJECTION SUBCUTANEOUS at 05:40

## 2023-01-18 RX ADMIN — MODAFINIL 100 MILLIGRAM(S): 200 TABLET ORAL at 11:03

## 2023-01-18 RX ADMIN — Medication 25 MILLIGRAM(S): at 22:12

## 2023-01-18 RX ADMIN — BUDESONIDE AND FORMOTEROL FUMARATE DIHYDRATE 2 PUFF(S): 160; 4.5 AEROSOL RESPIRATORY (INHALATION) at 19:34

## 2023-01-18 RX ADMIN — Medication 100 MILLIGRAM(S): at 17:03

## 2023-01-18 NOTE — PROGRESS NOTE ADULT - PROBLEM SELECTOR PLAN 2
- likely toxic metabolic encephalopathy related to fever  - check TSH  - CT Head 1/17 - no acute pathology  - neurology eval

## 2023-01-18 NOTE — PROGRESS NOTE ADULT - PROBLEM SELECTOR PLAN 3
Patient received IV hydralazine 10mg and IV labetalol 10mg in ER  Unclear what BP meds patient was on in the past  Will start metoprolol 25mg q12h  Hydralazine 10mg ivp q6h prn SBP > 180  Check ECHO  Cardio consult -- Dr. Corrales

## 2023-01-18 NOTE — PROGRESS NOTE ADULT - PROBLEM SELECTOR PLAN 1
Fever with leukocytosis -- unclear etiology  Patient received IV Rocephin x 1 in ER, will continue for now pending ID recommendations  Pan-culture and f/u culture data  Trend WBC  Check procalcitonin level  ID consult

## 2023-01-18 NOTE — PHYSICAL THERAPY INITIAL EVALUATION ADULT - PERTINENT HX OF CURRENT PROBLEM, REHAB EVAL
Spiritual Plan of Care    Pt Name: Mina Timmons  Pt : 1958  Date: 2022    Visit Type: In person  Referral Source: Interdisciplinary team  Reason for Visit: Consult  Visited With: Patient, Family/Friend  Length of Visit: 30 minutes  Requires Follow-up: No  Spiritual Care Consult Needed: Spiritual Care eval completed  Spiritual Care Visit Preference:     Taxonomy:    · Intended Effects: Convey a calming presence, Demonstrate caring and concern, Marisol Affirmation, Helping someone feel comforted, Journeying with someone in the grief process, Lessen anxiety, Preserve dignity and respect, Promote a sense of peace  · Methods: Assist with spiritual/Moravian practices, Demonstrate acceptance, Explore marisol and values, Explore spiritual/Moravian beliefs, Offer emotional support, Offer spiritual/Moravian support, Offer support  · Interventions: Acknowledge current situation, Perform a Moravian rite or ritual, [place]    Patient Affect at Time of Visit: Unresponsive  Patient Assessment: Unable to assess  Patient  Intervention: Emotional Support, Grief Support, Prayer, Reassurance, Sacramental/Ritual    Family/Friend Name: spouse Prema and son Sumit  Family/Friend Affect at Time of Visit: Alert, Cooperative, Expressive, Open to  visit, Pleasant, Sad, Tearful  Family/Friend Assessment: Acceptance, Anxious, Grief , Sad  Family/Friend  Intervention:  Support, Affirmation, Confession/Reconciliation, Grief Support, Prayer, Reassurance, Sacramental/Ritual    Spiritual Plan of Care: Follow up if requested  Patient Reported Outcome:  Coping techniques strengthened     consulted for end of life care. Pt mildly responsive. Spouse and son at bedside. Provided Denominational prayers and grief support. Additional  support available, prn.   This is an 83 y/o M with PMH HTN, a fib, HLD, SVT, BPH CVA presents to ED from the [facility] for AMS, HTN and fever. Per EMS suspected UTI as her has been urinating more frequently. Pt states he feels "off". Denies HA, chest pain, SOB, nausea vomiting. States "they told me my BP was high". As per RN pt c.o R knee pain. Pt states he fell a few days ago. Found to be febrile 101 rectally in ED. Daughter reports that patient's sleep-wake cycle has been off the past couple of weeks -- he's been sleeping more during the day and is up at night. She reports that he's missed several meals in the [facility] dining room. He usually walks independently with a walker. She saw him on 1/14/23 and he seemed to be in his usual state of health. Currently in ER patient is back to his baseline -- he is A & O x 4. Daughter reports that patient was recently taken off his BP meds because his BP was running low. He has a known h/o L UPJ obstruction -- he had seen a urologist and told that no further intervention was needed at that time. Pt with effusion right knee. Complaints of pain right knee.

## 2023-01-18 NOTE — PHYSICAL THERAPY INITIAL EVALUATION ADULT - ACTIVE RANGE OF MOTION EXAMINATION, REHAB EVAL
Right foot hip WFL right knee decreased to PROM due to pain/bilateral upper extremity Active ROM was WFL (within functional limits)/Left LE Active ROM was WFL (within functional limits)/deficits as listed below

## 2023-01-18 NOTE — CONSULT NOTE ADULT - ASSESSMENT
This is an 81 y/o M with PMH HTN, a fib, HLD, SVT, BPH CVA presents to ED from the [facility] for AMS, HTN and fever. Per EMS suspected UTI  Fever.  Elevated BP  AMS sec to Toxic Metabolic encephalopathy  No signs of meningitis.  Limited but non focal exam.  CT Head - No acute pathology.  No signs of CVA.  Sepsis w/up  Antibiotics.  ID  PT  Continue prior meds.  Fall/safety precautions.  D/w Pt. Questions answered.  Would continue to follow.    
The patient is an 82 year old male with a history of paroxysmal atrial fibrillation, HTN, SVT, CVA who presents with AMS, HTN, fever.    Plan:  - ECG with sinus rhythm and underlying RBBB  - As per daughter, the patient had a recent stress test and echo that were normal  - Continue flecainide 100 mg bid  - Add metoprolol tartrate 25 mg bid which should be used in combination with flecainide and also for HTN  - Hold HCTZ; resume on discharge  - Not on anticoagulation for atrial fibrillation - defer to outpatient cardiologist. Presumably there has not been recent episodes of atrial fibrillation.  - Continue aspirin 81 mg daily  - IV antibiotics

## 2023-01-18 NOTE — PHYSICAL THERAPY INITIAL EVALUATION ADULT - LEVEL OF INDEPENDENCE: STAND/SIT, REHAB EVAL
unable to perform
attending Onur: 62yF h/o HTN, hypothyroidism, arthritis, R shoulder replacement, L shoulder pain scheduled for L shoulder replacement, shingles (remote) p/w L upper extremity and L upper back pain worsening x 1 month. Similar to prior shingles pain. Worse with neck flexion. On exam, well-appearing, VSS, no rashes, +L upper parathoracic muscle spasm and point tenderness, 2+ radial pulses bilaterally,   Pain likely related to severe arthritis in shoulder vs cervical radiculopathy. Scheduled to see orthopedist in office tomorrow.  Will obtain xray, pain control and reassess.

## 2023-01-18 NOTE — CONSULT NOTE ADULT - SUBJECTIVE AND OBJECTIVE BOX
Patient is a 82y old  Male who presents with a chief complaint of AMS, fever, high BP (2023 07:54)    HPI: This is an 83 y/o M with PMH HTN, a fib, HLD, SVT, BPH CVA presents to ED from the [facility] for AMS, HTN and fever. Per EMS suspected UTI as her has been urinating more frequently. Pt states he feels "off". Denies HA, chest pain, SOB, nausea vomiting. States "they told me my BP was high". As per RN pt c.o R knee pain. Pt states he fell a few days ago. Found to be febrile 101 rectally in ED. Daughter reports that patient's sleep-wake cycle has been off the past couple of weeks -- he's been sleeping more during the day and is up at night. She reports that he's missed several meals in the [facility] dining room. He usually walks independently with a walker. She saw him on 23 and he seemed to be in his usual state of health. Currently in ER patient is back to his baseline -- he is A & O x 4. Daughter reports that patient was recently taken off his BP meds because his BP was running low. He has a known h/o L UPJ obstruction -- he had seen a urologist and told that no further intervention was needed at that time.    (2023 22:12)  No facial asymmetry  No lateralized weakness.  No LOC.  No shaking or seizures.    PAST MEDICAL & SURGICAL HISTORY:    Hypertension    Atrial fibrillation    Asthma    No significant past surgical history    MEDICATIONS  (STANDING):    aspirin enteric coated 81 milliGRAM(s) Oral daily  atorvastatin 20 milliGRAM(s) Oral at bedtime  budesonide 160 MICROgram(s)/formoterol 4.5 MICROgram(s) Inhaler 2 Puff(s) Inhalation two times a day  cefTRIAXone   IVPB 1000 milliGRAM(s) IV Intermittent every 24 hours  cholecalciferol 1000 Unit(s) Oral daily  enoxaparin Injectable 40 milliGRAM(s) SubCutaneous every 24 hours  flecainide 100 milliGRAM(s) Oral every 12 hours  metoprolol tartrate 25 milliGRAM(s) Oral every 12 hours  mirtazapine 7.5 milliGRAM(s) Oral daily  modafinil 100 milliGRAM(s) Oral daily  pantoprazole    Tablet 40 milliGRAM(s) Oral before breakfast  tamsulosin 0.4 milliGRAM(s) Oral at bedtime    MEDICATIONS  (PRN):    acetaminophen     Tablet .. 650 milliGRAM(s) Oral every 6 hours PRN Temp greater or equal to 38C (100.4F), Mild Pain (1 - 3)  ALPRAZolam 0.25 milliGRAM(s) Oral every 12 hours PRN anxiety  aluminum hydroxide/magnesium hydroxide/simethicone Suspension 30 milliLiter(s) Oral every 4 hours PRN Dyspepsia  hydrALAZINE Injectable 10 milliGRAM(s) IV Push every 6 hours PRN SBP > 180  melatonin 3 milliGRAM(s) Oral at bedtime PRN Insomnia  ondansetron Injectable 4 milliGRAM(s) IV Push every 6 hours PRN Nausea and/or Vomiting    Allergies    No Known Allergies    SOCIAL HISTORY:    No h/o Smoking.   No h/o alcohol use.    FAMILY HISTORY: N/C as per chart     REVIEW OF SYSTEMS:    CONSTITUTIONAL: No fever  EYES: No eye pain,   ENMT:  No sinus or throat pain  NECK: No pain or stiffness  RESPIRATORY: No cough, No hemoptysis; No shortness of breath  CARDIOVASCULAR: No acute chest pain, palpitations,  or leg swelling  GASTROINTESTINAL: No abdominal pain. No nausea, vomiting,   GENITOURINARY: No  hematuria, or incontinence  MUSCULOSKELETAL: No joint swelling; No extremity pain  SKIN: No itching, rashes, or lesions   LYMPH NODES: No enlarged glands  NEUROLOGICAL: No headaches, memory loss,   PSYCHIATRIC: No depression, anxiety, mood swings, or difficulty sleeping  ENDOCRINE: No heat or cold intolerance;   HEME/LYMPH: No easy bruising, or bleeding gums  Allergy/Immunology. No medication allergy. No seasonal allergies.    PHYSICAL EXAM:  Vital Signs Last 24 Hrs  T(F): 99.2 (23 @ 07:45)  HR: 79 (23 @ 07:45)  BP: 163/72 (23 @ 07:45)  RR: 20 (23 @ 07:45)    GENERAL: NAD, well-groomed, well-developed  HEAD:  Atraumatic, Normocephalic  EYES: EOMI, PERRLA, conjunctiva and sclera clear  NECK: Supple, No JVD, thyroid non-palpable    On Neurological Examination:    Mental Status - Pt is alert, awake, oriented X3. Higher functions are intact. Follows commands well and able to answer questions appropriately.    Speech -  Normal. Pt has no aphasia.    Cranial Nerves - Pupils 3 mm equal and reactive to light, extraocular eye movements intact. Pt has no visual field deficit.  No facial asymmetry. Tongue - is in midline.    Motor Exam - 4/5 all over, No drift. No shaking or tremors.    Sensory Exam -  Pt withdraws all extremities equally on stimulation.     Gait - Will get it tested with PT/RW to prevent fall.    Deep tendon Reflexes - 2 plus all over.    Coordination - No asymmtery      Neck Supple -  Yes.    LABS:                        14.3   14.67 )-----------( 262      ( 2023 06:30 )             42.9         143  |  107  |  21  ----------------------------<  114<H>  3.5   |  25  |  1.08    Ca    8.7      2023 06:30  Mg     2.2         TPro  6.9  /  Alb  2.6<L>  /  TBili  0.8  /  DBili  x   /  AST  16  /  ALT  <10<L>  /  AlkPhos  60      PT/INR - ( 2023 13:59 )   PT: 18.8 sec;   INR: 1.59 ratio      PTT - ( 2023 13:59 )  PTT:32.0 sec  Urinalysis Basic - ( 2023 17:55 )    Color: Yellow / Appearance: Clear / S.020 / pH: x  Gluc: x / Ketone: Moderate  / Bili: Negative / Urobili: 4 mg/dL   Blood: x / Protein: 100 mg/dL / Nitrite: Negative   Leuk Esterase: Negative / RBC: 0-2 /HPF / WBC Negative   Sq Epi: x / Non Sq Epi: Occasional / Bacteria: x    RADIOLOGY & ADDITIONAL STUDIES:    < from: CT Head No Cont (23 @ 14:44) >    No hydrocephalus, acute intracranial hemorrhage, mass effect, or brain edema.    Small air-fluid level in left sphenoid sinus, correlate for the presence of acute sinusitis.    < end of copied text >    < from: CT Renal Stone Hunt (23 @ 15:44) >    IMPRESSION: Chronic left UPJ obstruction with moderate hydronephrosis is unchanged. No renal calculus.    < end of copied text >    < from: US Transthoracic Echocardiogram w/Doppler Complete (23 @ 09:07) >    IMPRESSION:    1. Mild left ventricular hypertrophy with normal left ventricular systolic function with stage I diastolic dysfunction  2. aortic sclerosis, mild aortic regurgitation    < end of copied text >    
History of Present Illness: The patient is an 82 year old male with a history of paroxysmal atrial fibrillation, HTN, SVT, CVA who presents with AMS, HTN, fever. The patient is a poor historian. He was reportedly urinating frequently at [facility]. His BP was elevated and he was confused. He denies chest pain, shortness of breath, dizziness.    Past Medical/Surgical History:  paroxysmal atrial fibrillation, HTN, SVT, CVA    Medications:  Home Medications:  acetaminophen 325 mg oral tablet: 2 tab(s) orally every 6 hours (17 Jan 2023 21:03)  ALPRAZolam 0.25 mg oral tablet: 1 tab(s) orally every 12 hours (17 Jan 2023 21:03)  aspirin 81 mg oral delayed release tablet: 1 tab(s) orally once a day (17 Jan 2023 21:03)  Breo Ellipta 100 mcg-25 mcg/inh inhalation powder: 1 puff(s) inhaled once a day (17 Jan 2023 21:03)  clobetasol 0.05% topical cream: Apply topically to affected area 2 times a day, As Needed (17 Jan 2023 21:03)  Cough DM 30 mg/5 mL oral suspension, extended release: 10 milliliter(s) orally every 12 hours, As Needed (17 Jan 2023 21:03)  desonide 0.05% topical cream: Apply topically to affected area 3 times a day, As Needed (17 Jan 2023 21:03)  flecainide 100 mg oral tablet: 1 tab(s) orally every 12 hours (17 Jan 2023 21:03)  hydroCHLOROthiazide 25 mg oral tablet: 1 tab(s) orally once a day (17 Jan 2023 21:03)  levalbuterol 45 mcg/inh inhalation aerosol: 2 puff(s) inhaled every 6 hours (17 Jan 2023 21:03)  mirtazapine 7.5 mg oral tablet: 1 tab(s) orally once a day (17 Jan 2023 21:03)  modafinil 100 mg oral tablet: 1 tab(s) orally once a day (in the morning) (17 Jan 2023 21:03)  omeprazole 20 mg oral delayed release tablet: 1 tab(s) orally once a day (17 Jan 2023 21:03)  rosuvastatin 5 mg oral tablet: 1 tab(s) orally once a day (17 Jan 2023 21:03)  tamsulosin 0.4 mg oral capsule: 1 cap(s) orally once a day (17 Jan 2023 21:03)  Vitamin D3 50 mcg (2000 intl units) oral tablet: 1 tab(s) orally once a day (17 Jan 2023 21:03)      Family History: Non-contributory family history of premature cardiovascular atherosclerotic disease    Social History: No tobacco, alcohol or drug use    Review of Systems:  General: No fevers, chills, weight gain  Skin: No rashes, color changes  Cardiovascular: No chest pain, orthopnea  Respiratory: No shortness of breath, cough  Gastrointestinal: No nausea, abdominal pain  Genitourinary: No incontinence, pain with urination  Musculoskeletal: No pain, swelling, decreased range of motion  Neurological: No headache, weakness  Psychiatric: No depression, anxiety  Endocrine: No weight gain, increased thirst  All other systems are comprehensively negative.    Physical Exam:  Vitals:        Vital Signs Last 24 Hrs  T(C): 37.3 (18 Jan 2023 07:45), Max: 38.4 (17 Jan 2023 13:00)  T(F): 99.2 (18 Jan 2023 07:45), Max: 101.1 (17 Jan 2023 13:00)  HR: 79 (18 Jan 2023 07:45) (74 - 90)  BP: 163/72 (18 Jan 2023 07:45) (137/60 - 206/100)  BP(mean): --  RR: 20 (18 Jan 2023 07:45) (17 - 26)  SpO2: 92% (18 Jan 2023 07:45) (92% - 99%)  Parameters below as of 18 Jan 2023 07:45  Patient On (Oxygen Delivery Method): room air  General: NAD  HEENT: MMM  Neck: No JVD, no carotid bruit  Lungs: CTAB  CV: RRR, nl S1/S2, no M/R/G  Abdomen: S/NT/ND, +BS  Extremities: No LE edema, no cyanosis  Neuro: AAOx3, non-focal  Skin: No rash    Labs:                        14.3   14.67 )-----------( 262      ( 18 Jan 2023 06:30 )             42.9     01-18    143  |  107  |  21  ----------------------------<  114<H>  3.5   |  25  |  1.08    Ca    8.7      18 Jan 2023 06:30  Mg     2.2     01-18    TPro  6.9  /  Alb  2.6<L>  /  TBili  0.8  /  DBili  x   /  AST  16  /  ALT  <10<L>  /  AlkPhos  60  01-18        PT/INR - ( 17 Jan 2023 13:59 )   PT: 18.8 sec;   INR: 1.59 ratio         PTT - ( 17 Jan 2023 13:59 )  PTT:32.0 sec    ECG/Telemetry: NSR, RBBB    
    HPI:  83 y/o M  PMH HTN, a fib, HLD, SVT, BPH CVA psoriasis resident of [facility]  AMS, HTN and fever with weakness. Pt sp fall a few days ago. In ED wbc 17K febrile to 101. UA neg CT AP neg except for chronic  L UPJ obstruction with chronic hydro. No n/v/d CP SOB abd pain urinary symptoms or rash, NO recent travel or sick contacts. c/o joint pains/ Right > Left.    Infectious Disease consult was called to evaluate pt and for antibiotic management.       Past Medical & Surgical Hx:  PAST MEDICAL & SURGICAL HISTORY:  Hypertension  Atrial fibrillation  Asthma  No significant past surgical history        Social History--  EtOH: denies   Tobacco: denies   Drug Use: denies    FAMILY HISTORY:  Noncontributory    Allergies  No Known Allergies    Intolerances  NONE    Home Medications:  acetaminophen 325 mg oral tablet: 2 tab(s) orally every 6 hours (17 Jan 2023 21:03)  ALPRAZolam 0.25 mg oral tablet: 1 tab(s) orally every 12 hours (17 Jan 2023 21:03)  aspirin 81 mg oral delayed release tablet: 1 tab(s) orally once a day (17 Jan 2023 21:03)  Breo Ellipta 100 mcg-25 mcg/inh inhalation powder: 1 puff(s) inhaled once a day (17 Jan 2023 21:03)  clobetasol 0.05% topical cream: Apply topically to affected area 2 times a day, As Needed (17 Jan 2023 21:03)  Cough DM 30 mg/5 mL oral suspension, extended release: 10 milliliter(s) orally every 12 hours, As Needed (17 Jan 2023 21:03)  desonide 0.05% topical cream: Apply topically to affected area 3 times a day, As Needed (17 Jan 2023 21:03)  flecainide 100 mg oral tablet: 1 tab(s) orally every 12 hours (17 Jan 2023 21:03)  hydroCHLOROthiazide 25 mg oral tablet: 1 tab(s) orally once a day (17 Jan 2023 21:03)  levalbuterol 45 mcg/inh inhalation aerosol: 2 puff(s) inhaled every 6 hours (17 Jan 2023 21:03)  mirtazapine 7.5 mg oral tablet: 1 tab(s) orally once a day (17 Jan 2023 21:03)  modafinil 100 mg oral tablet: 1 tab(s) orally once a day (in the morning) (17 Jan 2023 21:03)  omeprazole 20 mg oral delayed release tablet: 1 tab(s) orally once a day (17 Jan 2023 21:03)  rosuvastatin 5 mg oral tablet: 1 tab(s) orally once a day (17 Jan 2023 21:03)  tamsulosin 0.4 mg oral capsule: 1 cap(s) orally once a day (17 Jan 2023 21:03)  Vitamin D3 50 mcg (2000 intl units) oral tablet: 1 tab(s) orally once a day (17 Jan 2023 21:03)    Current Inpatient Medications :    ANTIBIOTICS:   cefTRIAXone   IVPB 1000 milliGRAM(s) IV Intermittent every 24 hours      OTHER RELEVANT MEDICATIONS :  acetaminophen     Tablet .. 650 milliGRAM(s) Oral every 6 hours PRN  ALPRAZolam 0.25 milliGRAM(s) Oral every 12 hours PRN  aluminum hydroxide/magnesium hydroxide/simethicone Suspension 30 milliLiter(s) Oral every 4 hours PRN  aspirin enteric coated 81 milliGRAM(s) Oral daily  atorvastatin 20 milliGRAM(s) Oral at bedtime  budesonide 160 MICROgram(s)/formoterol 4.5 MICROgram(s) Inhaler 2 Puff(s) Inhalation two times a day  cholecalciferol 1000 Unit(s) Oral daily  enoxaparin Injectable 40 milliGRAM(s) SubCutaneous every 24 hours  flecainide 100 milliGRAM(s) Oral every 12 hours  hydrALAZINE Injectable 10 milliGRAM(s) IV Push every 6 hours PRN  ibuprofen  Tablet. 400 milliGRAM(s) Oral every 4 hours PRN  melatonin 3 milliGRAM(s) Oral at bedtime PRN  metoprolol tartrate 25 milliGRAM(s) Oral every 12 hours  mirtazapine 7.5 milliGRAM(s) Oral daily  modafinil 100 milliGRAM(s) Oral daily  ondansetron Injectable 4 milliGRAM(s) IV Push every 6 hours PRN  pantoprazole    Tablet 40 milliGRAM(s) Oral before breakfast  tamsulosin 0.4 milliGRAM(s) Oral at bedtime      ROS:  Unable to obtain due to :     ROS:  CONSTITUTIONAL:  Negative fever or chills, feels well, good appetite  EYES:  Negative  blurry vision or double vision  CARDIOVASCULAR:  Negative for chest pain or palpitations  RESPIRATORY:  Negative for cough, wheezing, or SOB   GASTROINTESTINAL:  Negative for nausea, vomiting, diarrhea, constipation, or abdominal pain  GENITOURINARY:  Negative frequency, urgency , dysuria or hematuria   NEUROLOGIC:  No headache, confusion, dizziness, lightheadedness  All other systems were reviewed and are negative          I&O's Detail    17 Jan 2023 07:01  -  18 Jan 2023 07:00  --------------------------------------------------------  IN:  Total IN: 0 mL    OUT:    Intermittent Catheterization - Urethral (mL): 350 mL  Total OUT: 350 mL    Total NET: -350 mL          Physical Exam:  Vital Signs Last 24 Hrs  T(C): 37.5 (18 Jan 2023 17:36), Max: 37.5 (18 Jan 2023 17:36)  T(F): 99.5 (18 Jan 2023 17:36), Max: 99.5 (18 Jan 2023 17:36)  HR: 81 (18 Jan 2023 17:36) (74 - 90)  BP: 132/73 (18 Jan 2023 17:36) (132/73 - 177/82)  RR: 19 (18 Jan 2023 17:36) (17 - 20)  SpO2: 97% (18 Jan 2023 17:49) (90% - 97%)    Parameters below as of 18 Jan 2023 17:49  Patient On (Oxygen Delivery Method): nasal cannula  O2 Flow (L/min): 2        General: weak no acute distress  Neck: supple, trachea midline  Lungs: clear, no wheeze/rhonchi  Cardiovascular: regular rate and rhythm, S1 S2  Abdomen: soft, nontender, ND, bowel sounds normal  Neurological:  alert and oriented x3  Skin: no rash  Extremities: mild right knee swelling  No erythema     Labs:                         14.3   14.67 )-----------( 262      ( 18 Jan 2023 06:30 )             42.9   01-18      143  |  107  |  21  ----------------------------<  114<H>  3.5   |  25  |  1.08    Ca    8.7      18 Jan 2023 06:30  Mg     2.2     01-18    TPro  6.9  /  Alb  2.6<L>  /  TBili  0.8  /  DBili  x   /  AST  16  /  ALT  <10<L>  /  AlkPhos  60  01-18      Urinalysis (01.17.23 @ 17:55)    pH Urine: 6.5    Glucose Qualitative, Urine: Negative mg/dL    Blood, Urine: Trace    Color: Yellow    Urine Appearance: Clear    Bilirubin: Negative    Ketone - Urine: Moderate    Specific Gravity: 1.020    Protein, Urine: 100 mg/dL    Urobilinogen: 4 mg/dL    Nitrite: Negative    Leukocyte Esterase Concentration: Negative  Urine Microscopic-Add On (NC) (01.17.23 @ 17:55)    Red Blood Cell - Urine: 0-2 /HPF    White Blood Cell - Urine: Negative    Epithelial Cells: Occasional      RECENT CULTURES:    Culture - Blood (collected 17 Jan 2023 13:59)  Source: .Blood Blood-Peripheral  Preliminary Report (18 Jan 2023 18:01):    No growth to date.    Culture - Blood (collected 17 Jan 2023 13:59)  Source: .Blood Blood-Peripheral  Preliminary Report (18 Jan 2023 18:01):    No growth to date.    Flu With COVID-19 By JJ (01.17.23 @ 13:59)    S[facility]2 [facility]: This Respiratory Panel uses polymerase chain reaction (PCR) to detect for  influenza A; influenza B; respiratory syncytial virus; and SARS-CoV-2.  This test was validated by [facility] and is in use under the FDA  Emergency Use Authorization (EUA) for clinical labs CLIA-certified to  perform high complexity testing. Test results should be correlated with  clinical presentation, patient history, and epidemiology.    Influenza [facility]: [facility]    Influenza B Result: [facility]    Resp Syn Virus Result: Not[facility]      RADIOLOGY & ADDITIONAL STUDIES:    ACC: [id number] EXAM:  XR KNEE AP LAT OBL 3 VIEWS RT                        ACC: [id number] EXAM:  XR CHEST PORTABLE IMMED 1V                          PROCEDURE DATE:  01/17/2023          INTERPRETATION:  AP chest on January 17, 2020 3:02 PM. Patient has sepsis.    Heart suggest normal size.    On June 12, 2021 there was slight left lower lung infiltrates and there   is only minimal residual linear atelectasis at this time.    Right knee. 3 views. Patient has local pain.    Small effusion is suspected.    There are calcified menisci. No fracture.    IMPRESSION: No acute chest finding. Small right knee effusion and mild   degeneration.      ACC: [id number] EXAM:  CT RENAL STONE HUNT                          PROCEDURE DATE:  01/17/2023          INTERPRETATION:  CLINICAL INFORMATION: fever SCT    COMPARISON: 6.12.21.    CONTRAST/COMPLICATIONS:  IV Contrast: None  Oral Contrast: None  Complications: None    PROCEDURE:  CT of the Abdomen and Pelvis was performed.  Sagittal and coronal reformats were performed.    FINDINGS:    LOWER CHEST: A 3 mm right lower lobe nodule is unchanged.    LIVER: Within normal limits.  BILE DUCTS: Normal caliber.  GALLBLADDER: Within normal limits.  SPLEEN: Within normal limits.  PANCREAS: Within normal limits.  ADRENALS: Within normal limits.  KIDNEYS/URETERS: Chronic left UPJ obstruction with moderate   hydronephrosis is unchanged. No renal calculus.    BLADDER: Small diverticulum.  REPRODUCTIVE ORGANS: The prostate is enlarged.    BOWEL: No bowel obstruction.  PERITONEUM: No ascites.  VESSELS:  Within normal limits.  RETROPERITONEUM/LYMPH NODES: A 3.7 x 1.9 cm retroperitoneal cystic lesion   on series 3 image 81 previously 2.7 x 1.2 cm.  ABDOMINAL WALL: Within normal limits.  BONES: Within normal limits.    IMPRESSION: Chronic left UPJ obstruction with moderate hydronephrosis is   unchanged. No renal calculus.    A retroperitoneal cystic lesion is mildly increased in size; consider   follow-up MRI.      Assessment :   83 y/o M  PMH HTN, a fib, HLD, SVT, BPH CVA psoriasis resident of [facility]  AMS, HTN and fever with weakness. Pt sp fall a few days ago. In ED wbc 17K febrile to 101. UA neg CT AP neg except for chronic  L UPJ obstruction with chronic hydro. No n/v/d CP SOB abd pain urinary symptoms or rash, NO recent travel or sick contacts. c/o joint pains/ Right > Left.  Thus far work up unrevealing  ?viral       Plan :   Cont Rocephin  Fu cultures  Get CT chest   Trend temps and cbc  Pulm toileting  Asp precautions  Increase activity    Continue with present regiment .  Approptiate use of antibiotics and adverse effects reviewed.      I have discussed the above plan of care with patient and daughter in detail. They expressed understanding of the treatment plan . Risks, benefits and alternatives discussed in detail. I have asked if they have any questions or concerns and appropriately addressed them to the best of my ability .      > 45 minutes spent in direct patient care reviewing  the notes, lab data/ imaging , discussion with multidisciplinary team. All questions were addressed and answered to the best of my capacity .    Thank you for allowing me to participate in the care of your patient .      Edel Jacobo MD  Infectious Disease  [phone]

## 2023-01-19 ENCOUNTER — TRANSCRIPTION ENCOUNTER (OUTPATIENT)
Age: 83
End: 2023-01-19

## 2023-01-19 LAB
ANION GAP SERPL CALC-SCNC: 6 MMOL/L — SIGNIFICANT CHANGE UP (ref 5–17)
BUN SERPL-MCNC: 33 MG/DL — HIGH (ref 7–23)
CALCIUM SERPL-MCNC: 9.2 MG/DL — SIGNIFICANT CHANGE UP (ref 8.4–10.5)
CHLORIDE SERPL-SCNC: 107 MMOL/L — SIGNIFICANT CHANGE UP (ref 96–108)
CO2 SERPL-SCNC: 30 MMOL/L — SIGNIFICANT CHANGE UP (ref 22–31)
CREAT SERPL-MCNC: 1.33 MG/DL — HIGH (ref 0.5–1.3)
EGFR: 53 ML/MIN/1.73M2 — LOW
GLUCOSE SERPL-MCNC: 106 MG/DL — HIGH (ref 70–99)
HCT VFR BLD CALC: 41.3 % — SIGNIFICANT CHANGE UP (ref 39–50)
HGB BLD-MCNC: 13.4 G/DL — SIGNIFICANT CHANGE UP (ref 13–17)
MCHC RBC-ENTMCNC: 29.3 PG — SIGNIFICANT CHANGE UP (ref 27–34)
MCHC RBC-ENTMCNC: 32.4 GM/DL — SIGNIFICANT CHANGE UP (ref 32–36)
MCV RBC AUTO: 90.4 FL — SIGNIFICANT CHANGE UP (ref 80–100)
MRSA PCR RESULT.: SIGNIFICANT CHANGE UP
NRBC # BLD: 0 /100 WBCS — SIGNIFICANT CHANGE UP (ref 0–0)
PLATELET # BLD AUTO: 243 K/UL — SIGNIFICANT CHANGE UP (ref 150–400)
POTASSIUM SERPL-MCNC: 3.4 MMOL/L — LOW (ref 3.5–5.3)
POTASSIUM SERPL-SCNC: 3.4 MMOL/L — LOW (ref 3.5–5.3)
RBC # BLD: 4.57 M/UL — SIGNIFICANT CHANGE UP (ref 4.2–5.8)
RBC # FLD: 13.6 % — SIGNIFICANT CHANGE UP (ref 10.3–14.5)
S AUREUS DNA NOSE QL NAA+PROBE: SIGNIFICANT CHANGE UP
SODIUM SERPL-SCNC: 143 MMOL/L — SIGNIFICANT CHANGE UP (ref 135–145)
WBC # BLD: 12.79 K/UL — HIGH (ref 3.8–10.5)
WBC # FLD AUTO: 12.79 K/UL — HIGH (ref 3.8–10.5)

## 2023-01-19 PROCEDURE — 99233 SBSQ HOSP IP/OBS HIGH 50: CPT

## 2023-01-19 PROCEDURE — 71250 CT THORAX DX C-: CPT | Mod: 26

## 2023-01-19 RX ORDER — POTASSIUM CHLORIDE 20 MEQ
20 PACKET (EA) ORAL ONCE
Refills: 0 | Status: COMPLETED | OUTPATIENT
Start: 2023-01-19 | End: 2023-01-19

## 2023-01-19 RX ADMIN — Medication 400 MILLIGRAM(S): at 12:38

## 2023-01-19 RX ADMIN — Medication 81 MILLIGRAM(S): at 12:08

## 2023-01-19 RX ADMIN — Medication 20 MILLIEQUIVALENT(S): at 12:08

## 2023-01-19 RX ADMIN — Medication 25 MILLIGRAM(S): at 22:38

## 2023-01-19 RX ADMIN — Medication 3 MILLIGRAM(S): at 22:38

## 2023-01-19 RX ADMIN — BUDESONIDE AND FORMOTEROL FUMARATE DIHYDRATE 2 PUFF(S): 160; 4.5 AEROSOL RESPIRATORY (INHALATION) at 05:12

## 2023-01-19 RX ADMIN — Medication 100 MILLIGRAM(S): at 17:43

## 2023-01-19 RX ADMIN — Medication 25 MILLIGRAM(S): at 12:08

## 2023-01-19 RX ADMIN — ENOXAPARIN SODIUM 40 MILLIGRAM(S): 100 INJECTION SUBCUTANEOUS at 05:12

## 2023-01-19 RX ADMIN — PANTOPRAZOLE SODIUM 40 MILLIGRAM(S): 20 TABLET, DELAYED RELEASE ORAL at 05:12

## 2023-01-19 RX ADMIN — ATORVASTATIN CALCIUM 20 MILLIGRAM(S): 80 TABLET, FILM COATED ORAL at 21:38

## 2023-01-19 RX ADMIN — Medication 650 MILLIGRAM(S): at 05:42

## 2023-01-19 RX ADMIN — BUDESONIDE AND FORMOTEROL FUMARATE DIHYDRATE 2 PUFF(S): 160; 4.5 AEROSOL RESPIRATORY (INHALATION) at 18:47

## 2023-01-19 RX ADMIN — Medication 400 MILLIGRAM(S): at 13:20

## 2023-01-19 RX ADMIN — Medication 400 MILLIGRAM(S): at 17:57

## 2023-01-19 RX ADMIN — Medication 100 MILLIGRAM(S): at 05:11

## 2023-01-19 RX ADMIN — Medication 650 MILLIGRAM(S): at 05:12

## 2023-01-19 RX ADMIN — MIRTAZAPINE 7.5 MILLIGRAM(S): 45 TABLET, ORALLY DISINTEGRATING ORAL at 12:08

## 2023-01-19 RX ADMIN — TAMSULOSIN HYDROCHLORIDE 0.4 MILLIGRAM(S): 0.4 CAPSULE ORAL at 21:38

## 2023-01-19 RX ADMIN — Medication 400 MILLIGRAM(S): at 18:42

## 2023-01-19 RX ADMIN — CEFTRIAXONE 100 MILLIGRAM(S): 500 INJECTION, POWDER, FOR SOLUTION INTRAMUSCULAR; INTRAVENOUS at 09:56

## 2023-01-19 RX ADMIN — MODAFINIL 100 MILLIGRAM(S): 200 TABLET ORAL at 12:09

## 2023-01-19 RX ADMIN — Medication 1000 UNIT(S): at 12:08

## 2023-01-19 NOTE — PATIENT CHOICE NOTE. - NSPTCHOICENOTES_GEN_ALL_CORE
Family has deferred to [facility] staff to coordinate any skilled home care needs. [facility]'s preferred home care agencies: [facility] [phone], [facility] [phone]. DC plan pending pt's hospital course and progress with PT.  Family has deferred to [facility] staff to coordinate any skilled home care needs. [facility]'s preferred home care agencies: [facility] [phone], [facility] [place] [phone]. DC plan pending pt's hospital course and progress with PT.   Pt is now for [facility].  Family in agreement.

## 2023-01-19 NOTE — CARE COORDINATION ASSESSMENT. - PRO ARRIVE FROM
[facility] on [address] [phone]/ fax [phone]. Utilizes [facility] pharmacy [phone]/assisted living facility

## 2023-01-19 NOTE — CAREGIVER ENGAGEMENT NOTE - CAREGIVER OUTREACH NOTES - FREE TEXT
RN/CM noted that pt was unable to fully participate with DC planning discussion. CM reached out to daughter/HCP Kenya Collnis [phone]. CM explained role and provided pt with CM contact info. Initiated DC planning discussion, daughter is opting for pt to transition back to [facility] assisted living facility ([facility])once he is medically optimized for discharge. As per daughter, pt has community MD Alex Rivera but is also being seen by [facility] MD DR Candice Conroy [phone]/ Fax [phone]/cell [phone]. Pt's pharmacy is [facility]. Pt's podiatrist is DR Leonard Hamilton [phone]. As per daughter, pt has received COVID booster series and assisted living facility confirmed that pt has received bivalent booster Pfizer on 11/02/2022. Pt was able to ambulate independently @ assisted living facility with his rolling walker. Family stated that once pt is optimized for DC, they will transport him back to [facility]. CM has reached out to [facility] [phone], spoke to [facility] CM director, Jana FAULKNER, who confirmed all above. AS per [facility] staff once pt is medically optimized for DC, they are requesting for the following DC paperwork to be faxed over to [phone]: [facility] 1-page attestation form (filled out by MD directing aftercare @ assisted living), COVID results within 48-72H of DC, updated PT notes; any NEW/CHANGED meds to be sent over to [facility]. @ daughter's request, CM apprised her of PT eval report and daughter verbalized concerns as they are opting for pt to return to [facility] and NOT rehab placement. As per [facility] CM, she will reach out to daughter and apprise her of level of function that would make pt appropriate for return to assisted living facility. Family has deferred to [facility] staff to coordinate any skilled home care needs. DC plan pending on pt's hospital course and progress with PT.

## 2023-01-19 NOTE — CARE COORDINATION ASSESSMENT. - NSDCPLANSERVICES_GEN_ALL_CORE
RN/CM noted that pt was unable to fully participate with DC planning discussion. CM reached out to daughter/HCP Kenya Collins [phone]. CM explained role and provided pt with CM contact info. Initiated DC planning discussion, daughter is opting for pt to transition back to [facility] assisted living facility ([facility])once he is medically optimized for discharge. As per daughter, pt has community MD Alex Rivera but is also being seen by [facility] MD DR Candice Conroy [phone]/ Fax [phone]/cell [phone]. Pt's pharmacy is [facility]. Pt's podiatrist is DR Leonard Hamilton [phone]. As per daughter, pt has received COVID booster series and assisted living facility confirmed that pt has received bivalent booster Pfizer on 11/02/2022. Pt was able to ambulate independently @ assisted living facility with his rolling walker. Family stated that once pt is optimized for DC, they will transport him back to [facility]. CM has reached out to [facility] [phone], spoke to [facility] CM director, Jana FAULKNER, who confirmed all above. AS per [facility] staff once pt is medically optimized for DC, they are requesting for the following DC paperwork to be faxed over to [phone]: [facility] 1-page attestation form (filled out by MD directing aftercare @ assisted living), COVID results within 48-72H of DC, updated PT notes; any NEW/CHANGED meds to be sent over to [facility]. @ daughter's request, CM apprised her of PT eval report and daughter verbalized concerns as they are opting for pt to return to [facility] and NOT rehab placement. As per [facility] CM, she will reach out to daughter and apprise her of level of function that would make pt appropriate for return to assisted living facility. Family has deferred to [facility] staff to coordinate any skilled home care needs. DC plan pending on pt's hospital course and progress with PT. RN/CM noted that pt was unable to fully participate with DC planning discussion. CM reached out to daughter/HCP Kenya Collins [phone]. CM explained role and provided pt with CM contact info. Initiated DC planning discussion, daughter is opting for pt to transition back to [facility] assisted living facility ([facility])once he is medically optimized for discharge. As per daughter, pt has community MD Alex Rivera but is also being seen by [facility] MD DR Candice Conroy [phone]/ Fax [phone]/cell [phone]. Pt's pharmacy is [facility]. Pt's podiatrist is DR Leonard Hamilton [phone]. As per daughter, pt has received COVID booster series and assisted living facility confirmed that pt has received bivalent booster Pfizer on 11/02/2022. Pt was able to ambulate independently @ assisted living facility with his rolling walker. Family stated that once pt is optimized for DC, they will transport him back to [facility]. CM has reached out to [facility] [phone], spoke to [facility] CM director, Jana FAULKNER, who confirmed all above. AS per [facility] staff once pt is medically optimized for DC, they are requesting for the following DC paperwork to be faxed over to [phone]: [facility] 1-page attestation form (filled out by MD directing aftercare @ assisted living), COVID results within 48-72H of DC, updated PT notes; any NEW/CHANGED meds to be sent over to [facility]. @ daughter's request, CM apprised her of PT eval report and daughter verbalized concerns as they are opting for pt to return to [facility] and NOT rehab placement. As per [facility] CM, she will reach out to daughter and apprise her of level of function that would make pt appropriate for return to assisted living facility. Family has deferred to [facility] staff to coordinate any skilled home care needs. DC plan pending on pt's hospital course and progress with PT./Skilled Nursing Facility-Short Term

## 2023-01-19 NOTE — CAREGIVER ENGAGEMENT NOTE - CAREGIVER EDUCATION NOTES - FREE TEXT
RN/CM noted that pt was unable to fully participate with DC planning discussion. CM reached out to daughter/HCP Kenya Collins [phone]. CM explained role and provided pt with CM contact info. Initiated DC planning discussion, daughter is opting for pt to transition back to [facility] assisted living facility ([facility])once he is medically optimized for discharge. As per daughter, pt has community MD Alex Rivera but is also being seen by [facility] MD DR Candice Conroy [phone]/ Fax [phone]/cell [phone]. Pt's pharmacy is [facility]. Pt's podiatrist is DR Leonard Hamilton [phone]. As per daughter, pt has received COVID booster series and assisted living facility confirmed that pt has received bivalent booster Pfizer on 11/02/2022. Pt was able to ambulate independently @ assisted living facility with his rolling walker. Family stated that once pt is optimized for DC, they will transport him back to [facility]. CM has reached out to [facility] [phone], spoke to [facility] CM director, Jana FAULKNER, who confirmed all above. AS per [facility] staff once pt is medically optimized for DC, they are requesting for the following DC paperwork to be faxed over to [phone]: [facility] 1-page attestation form (filled out by MD directing aftercare @ assisted living), COVID results within 48-72H of DC, updated PT notes; any NEW/CHANGED meds to be sent over to [facility]. @ daughter's request, CM apprised her of PT eval report and daughter verbalized concerns as they are opting for pt to return to [facility] and NOT rehab placement. As per [facility] CM, she will reach out to daughter and apprise her of level of function that would make pt appropriate for return to assisted living facility. Family has deferred to [facility] staff to coordinate any skilled home care needs. DC plan pending on pt's hospital course and progress with PT.

## 2023-01-19 NOTE — DISCHARGE NOTE NURSING/CASE MANAGEMENT/SOCIAL WORK - PATIENT PORTAL LINK FT
You can access the FollowMyHealth Patient Portal offered by [facility] by registering at the following website: [email or web address]. By joining [employer]’s FollowMyHealth portal, you will also be able to view your health information using other applications (apps) compatible with our system.

## 2023-01-19 NOTE — CAREGIVER ENGAGEMENT NOTE - CAREGIVER NAME
as per pt's daughter/HCP, Kenya- staff of [facility] has been providing pt with needed care

## 2023-01-19 NOTE — PROGRESS NOTE ADULT - ASSESSMENT
The patient is an 82 year old male with a history of paroxysmal atrial fibrillation, HTN, SVT, CVA who presents with AMS, HTN, fever.    Plan:  - ECG with sinus rhythm and underlying RBBB  - As per daughter, the patient had a recent stress test and echo that were normal  - Continue flecainide 100 mg bid  - Continue metoprolol tartrate 25 mg bid which should be used in combination with flecainide and also for HTN  - Hold HCTZ; resume on discharge  - Not on anticoagulation for atrial fibrillation - defer to outpatient cardiologist. Presumably there has not been recent episodes of atrial fibrillation.  - Continue aspirin 81 mg daily  - IV antibiotics

## 2023-01-19 NOTE — CARE COORDINATION ASSESSMENT. - NSPASTMEDSURGHISTORY_GEN_ALL_CORE_FT
PAST MEDICAL & SURGICAL HISTORY:  Asthma      Atrial fibrillation      Hypertension      No significant past surgical history

## 2023-01-19 NOTE — DISCHARGE NOTE NURSING/CASE MANAGEMENT/SOCIAL WORK - NSDPFAC_GEN_ALL_CORE
[facility] on [address] [phone]/ fax [phone].  Lydia Spencer at 1pm

## 2023-01-19 NOTE — DISCHARGE NOTE NURSING/CASE MANAGEMENT/SOCIAL WORK - NSDCCRNAME_GEN_ALL_CORE_FT
[facility] on [address] [phone]/ fax [phone]. Utilizes [facility] pharmacy [phone]

## 2023-01-19 NOTE — PROGRESS NOTE ADULT - ASSESSMENT
This is an 83 y/o M with PMH HTN, a fib, HLD, SVT, BPH CVA presents to ED from the [facility] for AMS, HTN and fever. Per EMS suspected UTI  Had Fever.  Elevated BP  AMS sec to Toxic Metabolic encephalopathy - improved.   No signs of meningitis.  Limited but non focal exam.  CT Head - No acute pathology.  No signs of CVA.  Seen by ID. Antibiotics.  PT  Continue prior meds.  Fall/safety precautions.  D/w Pt. Questions answered.  DC planning.

## 2023-01-19 NOTE — DISCHARGE NOTE NURSING/CASE MANAGEMENT/SOCIAL WORK - NSDCPEFALRISK_GEN_ALL_CORE
For information on Fall & Injury Prevention, visit: [email or web address] OR  [email or web address] OR  https://www.cdc.gov/steadi/patient.html

## 2023-01-19 NOTE — PATIENT CHOICE NOTE. - NSPTCHOICESTATE_GEN_ALL_CORE

## 2023-01-19 NOTE — CARE COORDINATION ASSESSMENT. - NSCAREPROVIDERS_GEN_ALL_CORE_FT
CARE PROVIDERS:  Accepting Physician: Mayo Sanchez  Administration: Jyoti Luciano  Administration: Carmita Whitaker  Admitting: Michael Benitez  Attending: Michael Benitez  Case Management: Homa Rodriguez  Consultant: Nitesh Saldana  Consultant: Edel Jacobo  Consultant: Leonard Corrales  Covering Team: Ashish Vega  Covering Team: Randolph Ley  ED ACP: Patricia Okeefe  ED Attending: Miriam Anderson  ED Nurse: Patricia To  Infection Control: Wanda Magaña  Nurse: Lydia Quintero  Nurse: Irving Robles  Override: Anita Darling  PCA/Nursing Assistant: Janice Moore  Physical Therapy: Helga Johansen  Physical Therapy: Manpreet Anton  Primary Team: Michael Benitez  Primary Team: Mikey Richardson  Primary Team: Irving Quiñones  Registered Dietitian: Shanice Robbins  Team: TIMOTHY  Hospitalists, Team  UR// Supp. Assoc.: Andres Ruiz   CARE PROVIDERS:  Accepting Physician: Mayo Sanchez  Administration: Uzair Bridges  Administration: Carmita Whitaker  Admitting: Michael Benitez  Attending: Michael Benitez  Case Management: Homa Rodriguez  Consultant: Nitesh Saldana  Consultant: Edel Jacobo  Consultant: Leonard Corrales  Consultant: Marleen Maria  Covering Team: Anna Phillips  Covering Team: Randolph Ley  Covering Team: Christel Corrales  ED ACP: Patricia Okeefe  ED Attending: Mriiam Anderson ED Nurse: Patricia To  Infection Control: Wanda Magaña  Nurse: Nicolette Vasquez  Nurse: Anita Darling  PCA/Nursing Assistant: Mireya Wolfe  Physical Therapy: Helga Johansen  Primary Team: Mikey Richardson  Respiratory Therapy: Aleksander Batista  : James Craig  Team: TIMOTHY  Hospitalists, Team  UR// Supp. Assoc.: Marva Alex

## 2023-01-19 NOTE — PROGRESS NOTE ADULT - ASSESSMENT
Pt is a 83yo M admitted to [facility] for fever and elevated BP.     *metabolic encephalopathy with Fever with leukocytosis  no known source   S/p Rocephin at ED, will continue   ID consult appreciate it   blood culture neg to date   CT chest pending   stone hunt noted   WBC trending down   AMS resolved   CT head neg    Neurology consult appreciate it     *Elevated bp   Cont Flecainide 100mg bid and metoprolol 25mg BID  better controlled  cardio consult appreciate it   Echo noted   will resume HCTZ on discharge     *pAfib w hx of SVT   Rate controlled   Cont flecainide   not on AC   Cardio consult appreciate it     *BPH  cont Flomax and Finasteride     *HLD  Cont statin     *hypoK  replaced     *Debility with body pain  Daily PT ordered  Per daughter pt would prefer Motrin (ibuprofen in order)    Updated with daughter Kenya today via [phone]    pending CT chest

## 2023-01-19 NOTE — DISCHARGE NOTE NURSING/CASE MANAGEMENT/SOCIAL WORK - NSDCCRTYPESERV_GEN_ALL_CORE_FT
You are a resident of above assisted living facility ([facility]) and receive assistance from [facility] staff

## 2023-01-20 LAB
ANION GAP SERPL CALC-SCNC: 9 MMOL/L — SIGNIFICANT CHANGE UP (ref 5–17)
BUN SERPL-MCNC: 31 MG/DL — HIGH (ref 7–23)
CALCIUM SERPL-MCNC: 9.3 MG/DL — SIGNIFICANT CHANGE UP (ref 8.4–10.5)
CHLORIDE SERPL-SCNC: 107 MMOL/L — SIGNIFICANT CHANGE UP (ref 96–108)
CO2 SERPL-SCNC: 27 MMOL/L — SIGNIFICANT CHANGE UP (ref 22–31)
CREAT SERPL-MCNC: 0.92 MG/DL — SIGNIFICANT CHANGE UP (ref 0.5–1.3)
CRP SERPL-MCNC: 143 MG/L — HIGH
EGFR: 83 ML/MIN/1.73M2 — SIGNIFICANT CHANGE UP
ERYTHROCYTE [SEDIMENTATION RATE] IN BLOOD: 81 MM/HR — HIGH (ref 0–9)
GLUCOSE SERPL-MCNC: 100 MG/DL — HIGH (ref 70–99)
HCT VFR BLD CALC: 39.9 % — SIGNIFICANT CHANGE UP (ref 39–50)
HGB BLD-MCNC: 13 G/DL — SIGNIFICANT CHANGE UP (ref 13–17)
MCHC RBC-ENTMCNC: 29.3 PG — SIGNIFICANT CHANGE UP (ref 27–34)
MCHC RBC-ENTMCNC: 32.6 GM/DL — SIGNIFICANT CHANGE UP (ref 32–36)
MCV RBC AUTO: 90.1 FL — SIGNIFICANT CHANGE UP (ref 80–100)
NRBC # BLD: 0 /100 WBCS — SIGNIFICANT CHANGE UP (ref 0–0)
PLATELET # BLD AUTO: 271 K/UL — SIGNIFICANT CHANGE UP (ref 150–400)
POTASSIUM SERPL-MCNC: 3.7 MMOL/L — SIGNIFICANT CHANGE UP (ref 3.5–5.3)
POTASSIUM SERPL-SCNC: 3.7 MMOL/L — SIGNIFICANT CHANGE UP (ref 3.5–5.3)
RBC # BLD: 4.43 M/UL — SIGNIFICANT CHANGE UP (ref 4.2–5.8)
RBC # FLD: 13.6 % — SIGNIFICANT CHANGE UP (ref 10.3–14.5)
SODIUM SERPL-SCNC: 143 MMOL/L — SIGNIFICANT CHANGE UP (ref 135–145)
WBC # BLD: 10.38 K/UL — SIGNIFICANT CHANGE UP (ref 3.8–10.5)
WBC # FLD AUTO: 10.38 K/UL — SIGNIFICANT CHANGE UP (ref 3.8–10.5)

## 2023-01-20 PROCEDURE — 99233 SBSQ HOSP IP/OBS HIGH 50: CPT

## 2023-01-20 PROCEDURE — 73502 X-RAY EXAM HIP UNI 2-3 VIEWS: CPT | Mod: 26,RT

## 2023-01-20 RX ORDER — IBUPROFEN 200 MG
400 TABLET ORAL EVERY 6 HOURS
Refills: 0 | Status: DISCONTINUED | OUTPATIENT
Start: 2023-01-20 | End: 2023-01-26

## 2023-01-20 RX ORDER — OXYCODONE HYDROCHLORIDE 5 MG/1
5 TABLET ORAL EVERY 6 HOURS
Refills: 0 | Status: DISCONTINUED | OUTPATIENT
Start: 2023-01-20 | End: 2023-01-20

## 2023-01-20 RX ORDER — SENNA PLUS 8.6 MG/1
2 TABLET ORAL AT BEDTIME
Refills: 0 | Status: DISCONTINUED | OUTPATIENT
Start: 2023-01-20 | End: 2023-01-26

## 2023-01-20 RX ORDER — OXYCODONE HYDROCHLORIDE 5 MG/1
2.5 TABLET ORAL EVERY 6 HOURS
Refills: 0 | Status: DISCONTINUED | OUTPATIENT
Start: 2023-01-20 | End: 2023-01-20

## 2023-01-20 RX ORDER — POLYETHYLENE GLYCOL 3350 17 G/17G
17 POWDER, FOR SOLUTION ORAL
Refills: 0 | Status: DISCONTINUED | OUTPATIENT
Start: 2023-01-20 | End: 2023-01-26

## 2023-01-20 RX ADMIN — Medication 400 MILLIGRAM(S): at 15:30

## 2023-01-20 RX ADMIN — Medication 650 MILLIGRAM(S): at 11:53

## 2023-01-20 RX ADMIN — CEFTRIAXONE 100 MILLIGRAM(S): 500 INJECTION, POWDER, FOR SOLUTION INTRAMUSCULAR; INTRAVENOUS at 09:08

## 2023-01-20 RX ADMIN — Medication 25 MILLIGRAM(S): at 11:14

## 2023-01-20 RX ADMIN — MIRTAZAPINE 7.5 MILLIGRAM(S): 45 TABLET, ORALLY DISINTEGRATING ORAL at 11:15

## 2023-01-20 RX ADMIN — Medication 81 MILLIGRAM(S): at 11:14

## 2023-01-20 RX ADMIN — MODAFINIL 100 MILLIGRAM(S): 200 TABLET ORAL at 11:14

## 2023-01-20 RX ADMIN — ENOXAPARIN SODIUM 40 MILLIGRAM(S): 100 INJECTION SUBCUTANEOUS at 05:43

## 2023-01-20 RX ADMIN — BUDESONIDE AND FORMOTEROL FUMARATE DIHYDRATE 2 PUFF(S): 160; 4.5 AEROSOL RESPIRATORY (INHALATION) at 05:43

## 2023-01-20 RX ADMIN — Medication 650 MILLIGRAM(S): at 05:52

## 2023-01-20 RX ADMIN — Medication 100 MILLIGRAM(S): at 17:24

## 2023-01-20 RX ADMIN — Medication 25 MILLIGRAM(S): at 22:17

## 2023-01-20 RX ADMIN — Medication 1000 UNIT(S): at 11:14

## 2023-01-20 RX ADMIN — PANTOPRAZOLE SODIUM 40 MILLIGRAM(S): 20 TABLET, DELAYED RELEASE ORAL at 05:53

## 2023-01-20 RX ADMIN — Medication 100 MILLIGRAM(S): at 05:52

## 2023-01-20 RX ADMIN — ATORVASTATIN CALCIUM 20 MILLIGRAM(S): 80 TABLET, FILM COATED ORAL at 21:52

## 2023-01-20 RX ADMIN — Medication 650 MILLIGRAM(S): at 18:47

## 2023-01-20 RX ADMIN — POLYETHYLENE GLYCOL 3350 17 GRAM(S): 17 POWDER, FOR SOLUTION ORAL at 17:24

## 2023-01-20 RX ADMIN — Medication 400 MILLIGRAM(S): at 14:48

## 2023-01-20 RX ADMIN — SENNA PLUS 2 TABLET(S): 8.6 TABLET ORAL at 21:52

## 2023-01-20 RX ADMIN — Medication 650 MILLIGRAM(S): at 12:30

## 2023-01-20 RX ADMIN — TAMSULOSIN HYDROCHLORIDE 0.4 MILLIGRAM(S): 0.4 CAPSULE ORAL at 21:52

## 2023-01-20 RX ADMIN — Medication 3 MILLIGRAM(S): at 21:52

## 2023-01-20 RX ADMIN — Medication 650 MILLIGRAM(S): at 18:01

## 2023-01-20 RX ADMIN — Medication 650 MILLIGRAM(S): at 06:21

## 2023-01-20 NOTE — CASE MANAGEMENT PROGRESS NOTE - NSCMPROGRESSNOTE_GEN_ALL_CORE
RN/CM noted pt remains on IV ABT. Physical Therapist(PT) has reported pt still needing 1-2 person assistance. Prior to admission pt has been independent with rolling walker use. As per staff on unit, pt complaining of RLE pain. MD apprised re: all above and will eval pt for better pain control. SAM has requested for PT to follow-up with pt this weekend. CM remains available and continues to follow case.

## 2023-01-20 NOTE — PROGRESS NOTE ADULT - ASSESSMENT
Pt is a 83yo M admitted to [place] for fever and elevated BP.     *metabolic encephalopathy with Fever with leukocytosis  no known source, UA neg, CT chest w/o PNA, BCx NGTD. CT stone tavares shows a retroperitoneal cystic lesion is mildly increased in size; can obtain outpatient MRI  Xray knee shows small effusion  S/p 3 day course of ceftriaxone  ID consult appreciated, can d/c abx  WBC improved  AMS resolved   CT head neg   Neurology consult appreciated    *Elevated bp   Cont Flecainide 100mg bid and metoprolol 25mg BID  better controlled  cardio consult appreciated  Echo noted   will resume HCTZ on discharge     *pAfib w hx of SVT   Rate controlled   Cont flecainide   not on AC   Cardio consult appreciated    *BPH  cont Flomax and Finasteride     *HLD  Cont statin     *hypoK  replaced     *Debility with body pain  Daily PT ordered  Xray knee shows small R effusion  Ordered Xray R hip  Per daughter pt would prefer Motrin    Updated daughter Kenya, would prefer avoiding opiates. Will alternate between tylenol and ibuprofen for now

## 2023-01-20 NOTE — PROGRESS NOTE ADULT - ASSESSMENT
This is an 83 y/o M with PMH HTN, a fib, HLD, SVT, BPH CVA presents to ED from the [facility] for AMS, HTN and fever. Per EMS suspected UTI  Had Fever.  Elevated BP  AMS sec to Toxic Metabolic encephalopathy - much improved.   No signs of meningitis.  Limited but non focal exam.  CT Head - No acute pathology.  No signs of CVA.  Seen by ID. Antibiotics.  Continue PT  Continue prior meds.  Fall/safety precautions.  D/w Pt. Questions answered.

## 2023-01-21 LAB
ANA TITR SER: NEGATIVE — SIGNIFICANT CHANGE UP
ANION GAP SERPL CALC-SCNC: 10 MMOL/L — SIGNIFICANT CHANGE UP (ref 5–17)
BUN SERPL-MCNC: 38 MG/DL — HIGH (ref 7–23)
CALCIUM SERPL-MCNC: 9.2 MG/DL — SIGNIFICANT CHANGE UP (ref 8.4–10.5)
CHLORIDE SERPL-SCNC: 105 MMOL/L — SIGNIFICANT CHANGE UP (ref 96–108)
CO2 SERPL-SCNC: 29 MMOL/L — SIGNIFICANT CHANGE UP (ref 22–31)
CREAT SERPL-MCNC: 0.93 MG/DL — SIGNIFICANT CHANGE UP (ref 0.5–1.3)
EGFR: 81 ML/MIN/1.73M2 — SIGNIFICANT CHANGE UP
GLUCOSE SERPL-MCNC: 157 MG/DL — HIGH (ref 70–99)
HCT VFR BLD CALC: 42.7 % — SIGNIFICANT CHANGE UP (ref 39–50)
HGB BLD-MCNC: 13.9 G/DL — SIGNIFICANT CHANGE UP (ref 13–17)
MAGNESIUM SERPL-MCNC: 1.8 MG/DL — SIGNIFICANT CHANGE UP (ref 1.6–2.6)
MCHC RBC-ENTMCNC: 29.3 PG — SIGNIFICANT CHANGE UP (ref 27–34)
MCHC RBC-ENTMCNC: 32.6 GM/DL — SIGNIFICANT CHANGE UP (ref 32–36)
MCV RBC AUTO: 89.9 FL — SIGNIFICANT CHANGE UP (ref 80–100)
NRBC # BLD: 0 /100 WBCS — SIGNIFICANT CHANGE UP (ref 0–0)
PHOSPHATE SERPL-MCNC: 3.5 MG/DL — SIGNIFICANT CHANGE UP (ref 2.5–4.5)
PLATELET # BLD AUTO: 263 K/UL — SIGNIFICANT CHANGE UP (ref 150–400)
POTASSIUM SERPL-MCNC: 4 MMOL/L — SIGNIFICANT CHANGE UP (ref 3.5–5.3)
POTASSIUM SERPL-SCNC: 4 MMOL/L — SIGNIFICANT CHANGE UP (ref 3.5–5.3)
RBC # BLD: 4.75 M/UL — SIGNIFICANT CHANGE UP (ref 4.2–5.8)
RBC # FLD: 13.2 % — SIGNIFICANT CHANGE UP (ref 10.3–14.5)
SODIUM SERPL-SCNC: 144 MMOL/L — SIGNIFICANT CHANGE UP (ref 135–145)
WBC # BLD: 8.68 K/UL — SIGNIFICANT CHANGE UP (ref 3.8–10.5)
WBC # FLD AUTO: 8.68 K/UL — SIGNIFICANT CHANGE UP (ref 3.8–10.5)

## 2023-01-21 PROCEDURE — 99232 SBSQ HOSP IP/OBS MODERATE 35: CPT

## 2023-01-21 RX ADMIN — SENNA PLUS 2 TABLET(S): 8.6 TABLET ORAL at 21:48

## 2023-01-21 RX ADMIN — Medication 400 MILLIGRAM(S): at 01:24

## 2023-01-21 RX ADMIN — Medication 400 MILLIGRAM(S): at 02:24

## 2023-01-21 RX ADMIN — Medication 100 MILLIGRAM(S): at 06:11

## 2023-01-21 RX ADMIN — POLYETHYLENE GLYCOL 3350 17 GRAM(S): 17 POWDER, FOR SOLUTION ORAL at 06:10

## 2023-01-21 RX ADMIN — Medication 650 MILLIGRAM(S): at 13:01

## 2023-01-21 RX ADMIN — Medication 650 MILLIGRAM(S): at 22:18

## 2023-01-21 RX ADMIN — Medication 100 MILLIGRAM(S): at 17:59

## 2023-01-21 RX ADMIN — ATORVASTATIN CALCIUM 20 MILLIGRAM(S): 80 TABLET, FILM COATED ORAL at 21:48

## 2023-01-21 RX ADMIN — BUDESONIDE AND FORMOTEROL FUMARATE DIHYDRATE 2 PUFF(S): 160; 4.5 AEROSOL RESPIRATORY (INHALATION) at 06:11

## 2023-01-21 RX ADMIN — Medication 400 MILLIGRAM(S): at 18:04

## 2023-01-21 RX ADMIN — Medication 1000 UNIT(S): at 12:59

## 2023-01-21 RX ADMIN — Medication 25 MILLIGRAM(S): at 23:14

## 2023-01-21 RX ADMIN — Medication 650 MILLIGRAM(S): at 21:48

## 2023-01-21 RX ADMIN — BUDESONIDE AND FORMOTEROL FUMARATE DIHYDRATE 2 PUFF(S): 160; 4.5 AEROSOL RESPIRATORY (INHALATION) at 18:01

## 2023-01-21 RX ADMIN — ENOXAPARIN SODIUM 40 MILLIGRAM(S): 100 INJECTION SUBCUTANEOUS at 06:11

## 2023-01-21 RX ADMIN — Medication 650 MILLIGRAM(S): at 13:47

## 2023-01-21 RX ADMIN — Medication 400 MILLIGRAM(S): at 08:56

## 2023-01-21 RX ADMIN — Medication 81 MILLIGRAM(S): at 12:59

## 2023-01-21 RX ADMIN — MIRTAZAPINE 7.5 MILLIGRAM(S): 45 TABLET, ORALLY DISINTEGRATING ORAL at 12:59

## 2023-01-21 RX ADMIN — MODAFINIL 100 MILLIGRAM(S): 200 TABLET ORAL at 13:01

## 2023-01-21 RX ADMIN — POLYETHYLENE GLYCOL 3350 17 GRAM(S): 17 POWDER, FOR SOLUTION ORAL at 17:59

## 2023-01-21 RX ADMIN — Medication 400 MILLIGRAM(S): at 18:41

## 2023-01-21 RX ADMIN — TAMSULOSIN HYDROCHLORIDE 0.4 MILLIGRAM(S): 0.4 CAPSULE ORAL at 21:48

## 2023-01-21 RX ADMIN — PANTOPRAZOLE SODIUM 40 MILLIGRAM(S): 20 TABLET, DELAYED RELEASE ORAL at 06:11

## 2023-01-21 RX ADMIN — Medication 400 MILLIGRAM(S): at 09:53

## 2023-01-21 RX ADMIN — Medication 25 MILLIGRAM(S): at 12:59

## 2023-01-21 NOTE — PROGRESS NOTE ADULT - ASSESSMENT
The patient is an 82 year old male with a history of paroxysmal atrial fibrillation, HTN, SVT, CVA who presents with AMS, HTN, fever.    1/21/23  Seen at [facility] telemetry  Lying flat, sleeping comfortably  Easily arousable, no cardiac complaints offered    Plan:  - ECG with sinus rhythm and underlying RBBB  - As per daughter, the patient had a recent stress test and echo that were normal  - Continue flecainide 100 mg bid  - Continue metoprolol tartrate 25 mg bid which should be used in combination with flecainide and also for HTN  - Hold HCTZ; resume on discharge  - Not on anticoagulation for atrial fibrillation - defer to outpatient cardiologist. Presumably there has not been recent episodes of atrial fibrillation.  - Continue aspirin 81 mg daily  - Off antibiotics

## 2023-01-21 NOTE — DIETITIAN NUTRITION RISK NOTIFICATION - TREATMENT: THE FOLLOWING DIET HAS BEEN RECOMMENDED
Diet, DASH/TLC:   Sodium & Cholesterol Restricted  Lactose Restricted (Milk Sugar Intoler.) (01-17-23 @ 21:37) [Active]

## 2023-01-21 NOTE — DIETITIAN INITIAL EVALUATION ADULT - OTHER INFO
Weight: Pt reports a UBW of 220 lbs ~7 months ago, with a current wt of 202 lbs consistent with admission wt of 200 lbs (1/17). Wt hx suggestive of 20 lb (9%) wt loss in ~7 months. Per transfer documents noted with a wt of 206 lbs and bed scale wt of 205 lbs (1/21) obtained at time of visit. Will continue to monitor weights and trend as available.     Since admission pt reports poor/fair appetite and PO intake and currently tolerating DASH/TLC, Lactose Restricted diet, denies difficulty chewing/ swallowing of foods at this time. Pt states consuming ~50% of meals provided in-house, able to obtain limited food preferences and communicated to diet office. Pt denies offer of oral nutrition supplements at this time, discussed importance of consuming adequate protein intake at meals to optimize nutrition status. Also, pt made aware food preferences to be obtained daily to optimize PO intake, verbalized understanding. RD to follow up to continue to monitor pt's nutrition status.  Weight: Pt reports a UBW of 220 lbs ~7 months ago, with a current wt of 202 lbs consistent with admission wt of 200 lbs (1/17). Wt hx suggestive of 20 lb (9%) wt loss in ~7 months. Per transfer documents noted with a wt of 206 lbs and bed scale wt of 205 lbs (1/21) obtained at time of visit. Will continue to monitor weights and trend as available.     Since admission pt reports poor/fair appetite and PO intake and currently tolerating DASH/TLC, Lactose Restricted diet, denies difficulty chewing/ swallowing of foods at this time. Pt states consuming ~50% of meals provided in-house, able to obtain limited food preferences and communicated to diet office. Pt denies offer of oral nutrition supplements at this time, of note, pt ordered for Remeron in-house. Discussed importance of consuming adequate protein intake at meals to optimize nutrition status and made aware food preferences to be obtained daily to optimize PO intake. RD to follow up to continue to monitor pt's nutrition status.

## 2023-01-21 NOTE — PROGRESS NOTE ADULT - ASSESSMENT
Pt is a 81yo M admitted to [place] for fever and elevated BP.     *metabolic encephalopathy with Fever with leukocytosis  no known source, UA neg, CT chest w/o PNA, BCx NGTD. CT stone tavares shows a retroperitoneal cystic lesion is mildly increased in size; can obtain outpatient MRI  Xray knee shows small effusion  S/p 3 day course of ceftriaxone  ID recs appreciated, monitor off abx  WBC improved  AMS resolved   CT head neg   Neurology consult appreciated    *Elevated bp   Cont Flecainide 100mg bid and metoprolol 25mg BID  better controlled  cardio consult appreciated  Echo noted   will resume HCTZ on discharge     *pAfib w hx of SVT   Rate controlled   Cont flecainide   not on AC   Cardio consult appreciated    *BPH  cont Flomax and Finasteride     *HLD  Cont statin     *hypoK  replaced     *Debility with body pain  Daily PT ordered  Xray knee shows small R effusion  Xray hip without acute findings  Per daughter pt would prefer Motrin    Updated daughter Kenya on 1/20, would prefer avoiding opiates. Will alternate between tylenol and ibuprofen for now  Called Kenya on 1/21 at 12:45pm, no response Pt is a 81yo M admitted to [place] for fever and elevated BP.     *metabolic encephalopathy with Fever with leukocytosis  no known source, UA neg, CT chest w/o PNA, BCx NGTD. CT stone tavares shows a retroperitoneal cystic lesion is mildly increased in size; can obtain outpatient MRI  Xray knee shows small effusion, Xray R  hip w/o significant findings  S/p 3 day course of ceftriaxone  ID recs appreciated, monitor off abx  WBC improved  AMS resolved   CT head neg   ESR, CRP elevated however TOMI neg. Possibly in the setting of psoriasis?   Neurology consult appreciated    *Elevated bp   Cont Flecainide 100mg bid and metoprolol 25mg BID  better controlled  cardio consult appreciated  Echo noted   will resume HCTZ on discharge     *pAfib w hx of SVT   Rate controlled   Cont flecainide   not on AC   Cardio consult appreciated    *BPH  cont Flomax and Finasteride     *HLD  Cont statin     *hypoK  replaced     *Debility with body pain  Daily PT ordered  Xray knee shows small R effusion  Xray hip without acute findings  Per daughter pt would prefer Motrin    Updated daughter Kenya on 1/20, would prefer avoiding opiates. Will alternate between tylenol and ibuprofen for now  Called Kenya on 1/21 at 12:45pm, no response

## 2023-01-21 NOTE — DIETITIAN INITIAL EVALUATION ADULT - PERTINENT LABORATORY DATA
01-21    144  |  105  |  38<H>  ----------------------------<  157<H>  4.0   |  29  |  0.93    Ca    9.2      21 Jan 2023 08:51  Phos  3.5     01-21  Mg     1.8     01-21

## 2023-01-21 NOTE — DIETITIAN INITIAL EVALUATION ADULT - SIGNS/SYMPTOMS
as evidenced by pt meeting <75% EER x>7days, mild/mod muscle/fat depletion, wt loss (9%) in ~7months

## 2023-01-21 NOTE — DIETITIAN INITIAL EVALUATION ADULT - NS FNS DIET ORDER
Diet, DASH/TLC:   Sodium & Cholesterol Restricted  Lactose Restricted (Milk Sugar Intoler.) (01-17-23 @ 21:37)

## 2023-01-21 NOTE — DIETITIAN INITIAL EVALUATION ADULT - ORAL INTAKE PTA/DIET HISTORY
Pt seen at bedside, presents from [facility]. Per transfer documents pt following a Lactose free, DAVIS diet with regular texture and thin liquids. Pt reports poor appetite and PO intake PTA due to dislikes of institutionalized and per H&P pt skipping meals. Confirms NKFA but reports lactose intolerance (GI distress) and denies micronutrient or nutrient supplement use.

## 2023-01-21 NOTE — DIETITIAN INITIAL EVALUATION ADULT - ADD RECOMMEND
1) Continue with current diet order and monitor tolerance 2) RD to remain available 3) Malnutrition sticker placed on EMR

## 2023-01-21 NOTE — DIETITIAN INITIAL EVALUATION ADULT - ETIOLOGY
related to inability to meet sufficient protein-energy needs in setting of persistent lack of appetite

## 2023-01-21 NOTE — DIETITIAN INITIAL EVALUATION ADULT - REASON FOR ADMISSION
As per H&P "The pt is on 83 y/o M with PMH HTN, a fib, HLD, SVT, BPH CVA presents to ED from the [facility] for AMS, HTN and fever. Per EMS suspected UTI as her has been urinating more frequently. Pt states he feels "off". Denies HA, chest pain, SOB, nausea vomiting. States "they told me my BP was high". As per RN pt c.o R knee pain. Pt states he fell a few days ago. Found to be febrile 101 rectally in ED. Daughter reports that patient's sleep-wake cycle has been off the past couple of weeks -- he's been sleeping more during the day and is up at night. She reports that he's missed several meals in the [facility] dining room. He usually walks independently with a walker. She saw him on 1/14/23 and he seemed to be in his usual state of health. Currently in ER patient is back to his baseline -- he is A & O x 4. Daughter reports that patient was recently taken off his BP meds because his BP was running low. He has a known h/o L UPJ obstruction -- he had seen a urologist and told that no further intervention was needed at that time."

## 2023-01-21 NOTE — PROGRESS NOTE ADULT - ASSESSMENT
This is an 83 y/o M with PMH HTN, a fib, HLD, SVT, BPH CVA presents to ED from the [facility] for AMS, HTN and fever.   Had Fever.  Elevated BP  AMS sec to Toxic Metabolic encephalopathy - much improved.   No signs of meningitis.  Had viral syndrome?  Limited but non focal exam.  CT Head - No acute pathology.  No signs of CVA.  Continue PT  Fall/safety precautions at all times.  D/w Pt. Questions answered.  Stable Neurologically.

## 2023-01-21 NOTE — DIETITIAN INITIAL EVALUATION ADULT - PERTINENT MEDS FT
MEDICATIONS  (STANDING):  aspirin enteric coated 81 milliGRAM(s) Oral daily  atorvastatin 20 milliGRAM(s) Oral at bedtime  budesonide 160 MICROgram(s)/formoterol 4.5 MICROgram(s) Inhaler 2 Puff(s) Inhalation two times a day  cholecalciferol 1000 Unit(s) Oral daily  enoxaparin Injectable 40 milliGRAM(s) SubCutaneous every 24 hours  flecainide 100 milliGRAM(s) Oral every 12 hours  metoprolol tartrate 25 milliGRAM(s) Oral every 12 hours  mirtazapine 7.5 milliGRAM(s) Oral daily  modafinil 100 milliGRAM(s) Oral daily  pantoprazole    Tablet 40 milliGRAM(s) Oral before breakfast  polyethylene glycol 3350 17 Gram(s) Oral two times a day  senna 2 Tablet(s) Oral at bedtime  tamsulosin 0.4 milliGRAM(s) Oral at bedtime    MEDICATIONS  (PRN):  acetaminophen     Tablet .. 650 milliGRAM(s) Oral every 6 hours PRN Temp greater or equal to 38C (100.4F), Mild Pain (1 - 3)  ALPRAZolam 0.25 milliGRAM(s) Oral every 12 hours PRN anxiety  aluminum hydroxide/magnesium hydroxide/simethicone Suspension 30 milliLiter(s) Oral every 4 hours PRN Dyspepsia  hydrALAZINE Injectable 10 milliGRAM(s) IV Push every 6 hours PRN SBP > 180  ibuprofen  Tablet. 400 milliGRAM(s) Oral every 6 hours PRN Moderate Pain (4 - 6)  melatonin 3 milliGRAM(s) Oral at bedtime PRN Insomnia

## 2023-01-22 LAB
ANION GAP SERPL CALC-SCNC: 9 MMOL/L — SIGNIFICANT CHANGE UP (ref 5–17)
BUN SERPL-MCNC: 32 MG/DL — HIGH (ref 7–23)
CALCIUM SERPL-MCNC: 9.2 MG/DL — SIGNIFICANT CHANGE UP (ref 8.4–10.5)
CHLORIDE SERPL-SCNC: 105 MMOL/L — SIGNIFICANT CHANGE UP (ref 96–108)
CO2 SERPL-SCNC: 26 MMOL/L — SIGNIFICANT CHANGE UP (ref 22–31)
CREAT SERPL-MCNC: 0.87 MG/DL — SIGNIFICANT CHANGE UP (ref 0.5–1.3)
CULTURE RESULTS: SIGNIFICANT CHANGE UP
CULTURE RESULTS: SIGNIFICANT CHANGE UP
EGFR: 86 ML/MIN/1.73M2 — SIGNIFICANT CHANGE UP
GLUCOSE SERPL-MCNC: 91 MG/DL — SIGNIFICANT CHANGE UP (ref 70–99)
HCT VFR BLD CALC: 43.6 % — SIGNIFICANT CHANGE UP (ref 39–50)
HGB BLD-MCNC: 14.4 G/DL — SIGNIFICANT CHANGE UP (ref 13–17)
MAGNESIUM SERPL-MCNC: 1.9 MG/DL — SIGNIFICANT CHANGE UP (ref 1.6–2.6)
MCHC RBC-ENTMCNC: 29.5 PG — SIGNIFICANT CHANGE UP (ref 27–34)
MCHC RBC-ENTMCNC: 33 GM/DL — SIGNIFICANT CHANGE UP (ref 32–36)
MCV RBC AUTO: 89.3 FL — SIGNIFICANT CHANGE UP (ref 80–100)
NRBC # BLD: 0 /100 WBCS — SIGNIFICANT CHANGE UP (ref 0–0)
PHOSPHATE SERPL-MCNC: 3.2 MG/DL — SIGNIFICANT CHANGE UP (ref 2.5–4.5)
PLATELET # BLD AUTO: 294 K/UL — SIGNIFICANT CHANGE UP (ref 150–400)
POTASSIUM SERPL-MCNC: 3.8 MMOL/L — SIGNIFICANT CHANGE UP (ref 3.5–5.3)
POTASSIUM SERPL-SCNC: 3.8 MMOL/L — SIGNIFICANT CHANGE UP (ref 3.5–5.3)
RBC # BLD: 4.88 M/UL — SIGNIFICANT CHANGE UP (ref 4.2–5.8)
RBC # FLD: 13.1 % — SIGNIFICANT CHANGE UP (ref 10.3–14.5)
SODIUM SERPL-SCNC: 140 MMOL/L — SIGNIFICANT CHANGE UP (ref 135–145)
SPECIMEN SOURCE: SIGNIFICANT CHANGE UP
SPECIMEN SOURCE: SIGNIFICANT CHANGE UP
WBC # BLD: 7.65 K/UL — SIGNIFICANT CHANGE UP (ref 3.8–10.5)
WBC # FLD AUTO: 7.65 K/UL — SIGNIFICANT CHANGE UP (ref 3.8–10.5)

## 2023-01-22 PROCEDURE — 99232 SBSQ HOSP IP/OBS MODERATE 35: CPT

## 2023-01-22 RX ADMIN — Medication 25 MILLIGRAM(S): at 10:42

## 2023-01-22 RX ADMIN — Medication 25 MILLIGRAM(S): at 23:51

## 2023-01-22 RX ADMIN — Medication 650 MILLIGRAM(S): at 12:12

## 2023-01-22 RX ADMIN — POLYETHYLENE GLYCOL 3350 17 GRAM(S): 17 POWDER, FOR SOLUTION ORAL at 06:03

## 2023-01-22 RX ADMIN — MIRTAZAPINE 7.5 MILLIGRAM(S): 45 TABLET, ORALLY DISINTEGRATING ORAL at 11:35

## 2023-01-22 RX ADMIN — Medication 100 MILLIGRAM(S): at 17:30

## 2023-01-22 RX ADMIN — Medication 400 MILLIGRAM(S): at 17:36

## 2023-01-22 RX ADMIN — PANTOPRAZOLE SODIUM 40 MILLIGRAM(S): 20 TABLET, DELAYED RELEASE ORAL at 06:04

## 2023-01-22 RX ADMIN — Medication 1000 UNIT(S): at 11:35

## 2023-01-22 RX ADMIN — Medication 650 MILLIGRAM(S): at 22:45

## 2023-01-22 RX ADMIN — BUDESONIDE AND FORMOTEROL FUMARATE DIHYDRATE 2 PUFF(S): 160; 4.5 AEROSOL RESPIRATORY (INHALATION) at 18:26

## 2023-01-22 RX ADMIN — TAMSULOSIN HYDROCHLORIDE 0.4 MILLIGRAM(S): 0.4 CAPSULE ORAL at 23:51

## 2023-01-22 RX ADMIN — POLYETHYLENE GLYCOL 3350 17 GRAM(S): 17 POWDER, FOR SOLUTION ORAL at 17:30

## 2023-01-22 RX ADMIN — BUDESONIDE AND FORMOTEROL FUMARATE DIHYDRATE 2 PUFF(S): 160; 4.5 AEROSOL RESPIRATORY (INHALATION) at 06:00

## 2023-01-22 RX ADMIN — Medication 400 MILLIGRAM(S): at 08:41

## 2023-01-22 RX ADMIN — MODAFINIL 100 MILLIGRAM(S): 200 TABLET ORAL at 11:35

## 2023-01-22 RX ADMIN — Medication 400 MILLIGRAM(S): at 16:52

## 2023-01-22 RX ADMIN — ENOXAPARIN SODIUM 40 MILLIGRAM(S): 100 INJECTION SUBCUTANEOUS at 06:04

## 2023-01-22 RX ADMIN — Medication 400 MILLIGRAM(S): at 09:30

## 2023-01-22 RX ADMIN — Medication 100 MILLIGRAM(S): at 06:04

## 2023-01-22 RX ADMIN — ATORVASTATIN CALCIUM 20 MILLIGRAM(S): 80 TABLET, FILM COATED ORAL at 23:51

## 2023-01-22 RX ADMIN — SENNA PLUS 2 TABLET(S): 8.6 TABLET ORAL at 23:51

## 2023-01-22 RX ADMIN — Medication 81 MILLIGRAM(S): at 11:35

## 2023-01-22 RX ADMIN — Medication 650 MILLIGRAM(S): at 22:15

## 2023-01-22 RX ADMIN — Medication 1 APPLICATION(S): at 17:30

## 2023-01-22 RX ADMIN — Medication 650 MILLIGRAM(S): at 11:36

## 2023-01-22 NOTE — PROGRESS NOTE ADULT - ASSESSMENT
Pt is a 83yo M admitted to [place] for fever and elevated BP.     *metabolic encephalopathy with Fever with leukocytosis  no known source, UA neg, CT chest w/o PNA, BCx NGTD. CT stone tavares shows a retroperitoneal cystic lesion is mildly increased in size; can obtain outpatient MRI  Xray knee shows small effusion, Xray R  hip w/o significant findings  S/p 3 day course of ceftriaxone  ID recs appreciated, monitor off abx  WBC improved  AMS resolved   CT head neg   ESR, CRP elevated however TOMI neg. Possibly in the setting of psoriasis?   Neurology consult appreciated    *Elevated bp   Cont Flecainide 100mg bid and metoprolol 25mg BID  better controlled  cardio consult appreciated  Echo noted   will resume HCTZ on discharge     *pAfib w hx of SVT   Rate controlled   Cont flecainide   not on AC   Cardio consult appreciated    *BPH  cont Flomax and Finasteride     *HLD  Cont statin     *hypoK  replaced     *Debility with body pain  Daily PT ordered  Xray knee shows small R effusion  Xray hip without acute findings  Per daughter pt would prefer Motrin    Updated daughter Kenya on 1/22, would prefer avoiding opiates. Will alternate between tylenol and ibuprofen for now

## 2023-01-22 NOTE — PROGRESS NOTE ADULT - ASSESSMENT
The patient is an 82 year old male with a history of paroxysmal atrial fibrillation, HTN, SVT, CVA who presents with AMS, HTN, fever.    1/22/23  Seen at [facility] telemetry  Lying flat, sleeping comfortably  Monitor-NSR    Plan:  - ECG with sinus rhythm and underlying RBBB  - As per daughter, the patient had a recent stress test and echo that were normal  - Continue flecainide 100 mg bid  - Continue metoprolol tartrate 25 mg bid which should be used in combination with flecainide and also for HTN  - Hold HCTZ; resume on discharge  - Not on anticoagulation for atrial fibrillation - defer to outpatient cardiologist. Presumably there has not been recent episodes of atrial fibrillation.  - Continue aspirin 81 mg daily  - Off antibiotics

## 2023-01-23 ENCOUNTER — TRANSCRIPTION ENCOUNTER (OUTPATIENT)
Age: 83
End: 2023-01-23

## 2023-01-23 PROCEDURE — 99233 SBSQ HOSP IP/OBS HIGH 50: CPT

## 2023-01-23 RX ORDER — ALPRAZOLAM 0.25 MG
0.25 TABLET ORAL EVERY 12 HOURS
Refills: 0 | Status: DISCONTINUED | OUTPATIENT
Start: 2023-01-25 | End: 2023-01-26

## 2023-01-23 RX ORDER — SENNA PLUS 8.6 MG/1
2 TABLET ORAL
Qty: 0 | Refills: 0 | DISCHARGE
Start: 2023-01-23

## 2023-01-23 RX ORDER — IBUPROFEN 200 MG
1 TABLET ORAL
Qty: 0 | Refills: 0 | DISCHARGE
Start: 2023-01-23

## 2023-01-23 RX ORDER — CHOLECALCIFEROL (VITAMIN D3) 125 MCG
1 CAPSULE ORAL
Qty: 0 | Refills: 0 | DISCHARGE

## 2023-01-23 RX ORDER — ACETAMINOPHEN 500 MG
2 TABLET ORAL
Qty: 0 | Refills: 0 | DISCHARGE

## 2023-01-23 RX ORDER — MODAFINIL 200 MG/1
100 TABLET ORAL DAILY
Refills: 0 | Status: DISCONTINUED | OUTPATIENT
Start: 2023-01-23 | End: 2023-01-26

## 2023-01-23 RX ORDER — DEXTROMETHORPHAN POLISTIREX 30 MG/5 ML
10 SUSPENSION, EXTENDED RELEASE 12 HR ORAL
Qty: 0 | Refills: 0 | DISCHARGE

## 2023-01-23 RX ORDER — AMLODIPINE BESYLATE 2.5 MG/1
2.5 TABLET ORAL DAILY
Refills: 0 | Status: DISCONTINUED | OUTPATIENT
Start: 2023-01-23 | End: 2023-01-23

## 2023-01-23 RX ORDER — METOPROLOL TARTRATE 50 MG
1 TABLET ORAL
Qty: 0 | Refills: 0 | DISCHARGE
Start: 2023-01-23

## 2023-01-23 RX ORDER — POLYETHYLENE GLYCOL 3350 17 G/17G
17 POWDER, FOR SOLUTION ORAL
Qty: 0 | Refills: 0 | DISCHARGE
Start: 2023-01-23

## 2023-01-23 RX ADMIN — Medication 1000 UNIT(S): at 11:51

## 2023-01-23 RX ADMIN — BUDESONIDE AND FORMOTEROL FUMARATE DIHYDRATE 2 PUFF(S): 160; 4.5 AEROSOL RESPIRATORY (INHALATION) at 18:39

## 2023-01-23 RX ADMIN — Medication 1 APPLICATION(S): at 05:17

## 2023-01-23 RX ADMIN — Medication 25 MILLIGRAM(S): at 22:58

## 2023-01-23 RX ADMIN — MODAFINIL 100 MILLIGRAM(S): 200 TABLET ORAL at 11:53

## 2023-01-23 RX ADMIN — POLYETHYLENE GLYCOL 3350 17 GRAM(S): 17 POWDER, FOR SOLUTION ORAL at 17:09

## 2023-01-23 RX ADMIN — TAMSULOSIN HYDROCHLORIDE 0.4 MILLIGRAM(S): 0.4 CAPSULE ORAL at 21:37

## 2023-01-23 RX ADMIN — SENNA PLUS 2 TABLET(S): 8.6 TABLET ORAL at 21:37

## 2023-01-23 RX ADMIN — Medication 1 APPLICATION(S): at 17:09

## 2023-01-23 RX ADMIN — ATORVASTATIN CALCIUM 20 MILLIGRAM(S): 80 TABLET, FILM COATED ORAL at 21:37

## 2023-01-23 RX ADMIN — Medication 81 MILLIGRAM(S): at 11:51

## 2023-01-23 RX ADMIN — Medication 3 MILLIGRAM(S): at 21:38

## 2023-01-23 RX ADMIN — Medication 400 MILLIGRAM(S): at 07:43

## 2023-01-23 RX ADMIN — Medication 400 MILLIGRAM(S): at 08:30

## 2023-01-23 RX ADMIN — MIRTAZAPINE 7.5 MILLIGRAM(S): 45 TABLET, ORALLY DISINTEGRATING ORAL at 11:51

## 2023-01-23 RX ADMIN — Medication 100 MILLIGRAM(S): at 17:09

## 2023-01-23 RX ADMIN — ENOXAPARIN SODIUM 40 MILLIGRAM(S): 100 INJECTION SUBCUTANEOUS at 05:20

## 2023-01-23 RX ADMIN — PANTOPRAZOLE SODIUM 40 MILLIGRAM(S): 20 TABLET, DELAYED RELEASE ORAL at 05:22

## 2023-01-23 RX ADMIN — Medication 100 MILLIGRAM(S): at 05:19

## 2023-01-23 RX ADMIN — BUDESONIDE AND FORMOTEROL FUMARATE DIHYDRATE 2 PUFF(S): 160; 4.5 AEROSOL RESPIRATORY (INHALATION) at 05:22

## 2023-01-23 RX ADMIN — POLYETHYLENE GLYCOL 3350 17 GRAM(S): 17 POWDER, FOR SOLUTION ORAL at 05:18

## 2023-01-23 RX ADMIN — Medication 25 MILLIGRAM(S): at 11:51

## 2023-01-23 NOTE — PROGRESS NOTE ADULT - ASSESSMENT
The patient is an 82 year old male with a history of paroxysmal atrial fibrillation, HTN, SVT, CVA who presents with AMS, HTN, fever.    Plan:  - ECG with sinus rhythm and underlying RBBB  - As per daughter, the patient had a recent stress test and echo that were normal  - Continue flecainide 100 mg bid  - Continue metoprolol tartrate 25 mg bid which should be used in combination with flecainide and also for HTN  - Hold HCTZ; resume on discharge  - Not on anticoagulation for atrial fibrillation - defer to outpatient cardiologist. Presumably there has not been recent episodes of atrial fibrillation.  - Continue aspirin 81 mg daily  - Completed antibiotics

## 2023-01-23 NOTE — PROGRESS NOTE ADULT - ASSESSMENT
Pt is a 81yo M admitted to [place] for fever and elevated BP.     *metabolic encephalopathy with Fever with leukocytosis  no known source, UA neg, CT chest w/o PNA, BCx NGTD. CT stone tavares shows a retroperitoneal cystic lesion is mildly increased in size; can obtain outpatient MRI  Xray knee shows small effusion, Xray R  hip w/o significant findings  S/p 3 day course of ceftriaxone  ID recs appreciated, monitor off abx  WBC improved  AMS resolved   CT head neg   ESR, CRP elevated however TOMI neg. Possibly in the setting of psoriasis?   Neurology consult appreciated    *Elevated bp   Cont Flecainide 100mg bid and metoprolol 25mg BID  better controlled  cardio consult appreciated  Echo noted   will resume HCTZ on discharge     *pAfib w hx of SVT   Rate controlled   Cont flecainide   not on AC   Cardio consult appreciated    *BPH  cont Flomax and Finasteride     *HLD  Cont statin     *hypoK  replaced     *Debility with body pain  Daily PT ordered  Xray knee shows small R effusion  Xray hip without acute findings  Per daughter pt would prefer Motrin  PT recommending [facility]    Updated daughter Kenya on 1/22, would prefer avoiding opiates. Will alternate between tylenol and ibuprofen for now

## 2023-01-23 NOTE — DISCHARGE NOTE PROVIDER - NSFOLLOWUPCLINICS_GEN_ALL_ED_FT
[facility]  Rheumatology  [address]  [place] [address]  Phone: [phone]  Fax:     [facility]  Orthopedic Surgery  [address]  [place] [address]  Phone: [phone]  Fax:

## 2023-01-23 NOTE — DISCHARGE NOTE PROVIDER - DETAILS OF MALNUTRITION DIAGNOSIS/DIAGNOSES
This patient has been assessed with a concern for Malnutrition and was treated during this hospitalization for the following Nutrition diagnosis/diagnoses:     -  01/21/2023: Severe protein-calorie malnutrition

## 2023-01-23 NOTE — DISCHARGE NOTE PROVIDER - HOSPITAL COURSE
*metabolic encephalopathy with Fever with leukocytosis  no known source, UA neg, CT chest w/o PNA, BCx NGTD. CT stone tavares shows a retroperitoneal cystic lesion is mildly increased in size; can obtain outpatient MRI  Xray knee shows small effusion, Xray R  hip w/o significant findings  S/p 3 day course of ceftriaxone  ID recs appreciated, monitor off abx  WBC improved  AMS resolved   CT head neg   ESR, CRP elevated however TOMI neg. Possibly in the setting of psoriasis?   Neurology consult appreciated    *Elevated bp   Cont Flecainide 100mg bid and metoprolol 25mg BID  better controlled  cardio consult appreciated  Echo noted   resume HCTZ    *pAfib w hx of SVT   Rate controlled   Cont flecainide   not on AC   Cardio consult appreciated    *BPH  cont Flomax and Finasteride     *HLD  Cont statin     *hypoK  replaced     *Debility with body pain  Daily PT ordered  Xray knee shows small R effusion  Xray hip without acute findings  Per daughter pt would prefer Motrin  PT recommending [facility]

## 2023-01-23 NOTE — DISCHARGE NOTE PROVIDER - NSDCMRMEDTOKEN_GEN_ALL_CORE_FT
acetaminophen 325 mg oral tablet: 2 tab(s) orally every 6 hours, As Needed  ALPRAZolam 0.25 mg oral tablet: 1 tab(s) orally every 12 hours  aspirin 81 mg oral delayed release tablet: 1 tab(s) orally once a day  Breo Ellipta 100 mcg-25 mcg/inh inhalation powder: 1 puff(s) inhaled once a day  clobetasol 0.05% topical cream: Apply topically to affected area 2 times a day, As Needed  desonide 0.05% topical cream: Apply topically to affected area 3 times a day, As Needed  flecainide 100 mg oral tablet: 1 tab(s) orally every 12 hours  hydroCHLOROthiazide 25 mg oral tablet: 1 tab(s) orally once a day  ibuprofen 400 mg oral tablet: 1 tab(s) orally every 6 hours, As needed, Moderate Pain (4 - 6)  levalbuterol 45 mcg/inh inhalation aerosol: 2 puff(s) inhaled every 6 hours  Metoprolol Tartrate 25 mg oral tablet: 1 tab(s) orally every 12 hours  mirtazapine 7.5 mg oral tablet: 1 tab(s) orally once a day  modafinil 100 mg oral tablet: 1 tab(s) orally once a day (in the morning)  omeprazole 20 mg oral delayed release tablet: 1 tab(s) orally once a day  polyethylene glycol 3350 oral powder for reconstitution: 17 gram(s) orally 2 times a day  rosuvastatin 5 mg oral tablet: 1 tab(s) orally once a day  senna leaf extract oral tablet: 2 tab(s) orally once a day (at bedtime)  tamsulosin 0.4 mg oral capsule: 1 cap(s) orally once a day

## 2023-01-23 NOTE — DISCHARGE NOTE PROVIDER - CARE PROVIDER_API CALL
Respiratory Alex Kruger)  Internal Medicine  [address]  [place] [address]  Phone: [phone]  Fax: [phone]  Follow Up Time:     Leonard Corrales)  Cardiovascular Disease; Internal Medicine  [address]  [place] [address]  Phone: [phone]  Fax: [phone]  Follow Up Time:

## 2023-01-23 NOTE — DISCHARGE NOTE PROVIDER - NSDCCPTREATMENT_GEN_ALL_CORE_FT
PRINCIPAL PROCEDURE  Procedure: XR knee, 3 views  Findings and Treatment: IMPRESSION: No acute chest finding. Small right knee effusion and mild   degeneration.       PRINCIPAL PROCEDURE  Procedure: XR knee, 3 views  Findings and Treatment: IMPRESSION: No acute chest finding. Small right knee effusion and mild   degeneration.      SECONDARY PROCEDURE  Procedure: CT renal w con  Findings and Treatment: FINDINGS:  LOWER CHEST: A 3 mm right lower lobe nodule is unchanged.  LIVER: Within normal limits.  BILE DUCTS: Normal caliber.  GALLBLADDER: Within normal limits.  SPLEEN: Within normal limits.  PANCREAS: Within normal limits.  ADRENALS: Within normal limits.  KIDNEYS/URETERS: Chronic left UPJ obstruction with moderate   hydronephrosis is unchanged. No renal calculus.  BLADDER: Small diverticulum.  REPRODUCTIVE ORGANS: The prostate is enlarged.  BOWEL: No bowel obstruction.  PERITONEUM: No ascites.  VESSELS:  Within normal limits.  RETROPERITONEUM/LYMPH NODES: A 3.7 x 1.9 cm retroperitoneal cystic lesion   on series 3 image 81 previously 2.7 x 1.2 cm.  ABDOMINAL WALL: Within normal limits.  BONES: Within normal limits.  IMPRESSION: Chronic left UPJ obstruction with moderate hydronephrosis is   unchanged. No renal calculus.  A retroperitoneal cystic lesion is mildly increased in size; consider   follow-up MRI.

## 2023-01-23 NOTE — DISCHARGE NOTE PROVIDER - NSDCCPCAREPLAN_GEN_ALL_CORE_FT
PRINCIPAL DISCHARGE DIAGNOSIS  Diagnosis: Fever of unknown origin  Assessment and Plan of Treatment: You came in with fever and were treated with antibiotics. Your fever resolved. No source of infection was found. If you spike another fever, please return to the emergency department.      SECONDARY DISCHARGE DIAGNOSES  Diagnosis: Leukocytosis  Assessment and Plan of Treatment: Your white count was elevated on admission, now resolved. You were treated with a course of antibiotics. Your inflammatory markers were also elevated. Please follow up with your pcp for further workup if necessary    Diagnosis: Right knee pain  Assessment and Plan of Treatment: You had Right knee pain which improved with tylenol and ibuprofen. Xray showed no fractures but there was  a small effusion. Please follow up with orthopedics and pcp upon discharge    Diagnosis: Atrial fibrillation  Assessment and Plan of Treatment: Continue your medications as prescribed. Follow up with your cardiologist regarding anticoagulation for your atrial fibrillation     PRINCIPAL DISCHARGE DIAGNOSIS  Diagnosis: Fever of unknown origin  Assessment and Plan of Treatment: You came in with fever and were treated with antibiotics. Your fever resolved. No source of infection was found. If you spike another fever, please return to the emergency department.      SECONDARY DISCHARGE DIAGNOSES  Diagnosis: Leukocytosis  Assessment and Plan of Treatment: Your white count was elevated on admission, now resolved. You were treated with a course of antibiotics. Your inflammatory markers were also elevated. Please follow up with your pcp for further workup if necessary    Diagnosis: Right knee pain  Assessment and Plan of Treatment: You had Right knee pain which improved with tylenol and ibuprofen. Xray showed no fractures but there was  a small effusion. Please follow up with orthopedics and pcp upon discharge if the pain worsens    Diagnosis: Atrial fibrillation  Assessment and Plan of Treatment: Continue your medications as prescribed. Follow up with your cardiologist regarding anticoagulation for your atrial fibrillation     PRINCIPAL DISCHARGE DIAGNOSIS  Diagnosis: Fever of unknown origin  Assessment and Plan of Treatment: You came in with fever and were treated with antibiotics. Your fever resolved. No source of infection was found. If you spike another fever, please return to the emergency department.      SECONDARY DISCHARGE DIAGNOSES  Diagnosis: Leukocytosis  Assessment and Plan of Treatment: Your white count was elevated on admission, now resolved. You were treated with a course of antibiotics. Your inflammatory markers were also elevated. Please follow up with your pcp for further workup if necessary    Diagnosis: Right knee pain  Assessment and Plan of Treatment: You had Right knee pain which improved with tylenol and ibuprofen. Xray showed no fractures but there was  a small effusion. Please follow up with orthopedics and pcp upon discharge if the pain worsens    Diagnosis: Atrial fibrillation  Assessment and Plan of Treatment: Continue your medications as prescribed. Follow up with your cardiologist regarding anticoagulation for your atrial fibrillation    Diagnosis: Imaging abnormality  Assessment and Plan of Treatment: Your CT showed chronic hydronephrosis which is unchanged, which is from urine back up into your kidneys. You were also found to have a cystic lesion in your retroperitoneal area. Follow up with your pcp to determine if an MRI is necessary in the future.     PRINCIPAL DISCHARGE DIAGNOSIS  Diagnosis: Fever of unknown origin  Assessment and Plan of Treatment: You came in with fever and were treated with antibiotics. Your fever resolved. No source of infection was found. If you spike another fever, please return to the emergency department.      SECONDARY DISCHARGE DIAGNOSES  Diagnosis: Leukocytosis  Assessment and Plan of Treatment: Your white count was elevated on admission, now resolved. You were treated with a course of antibiotics. Your inflammatory markers were also elevated. Please follow up with your pcp for further workup if necessary. Please follow up with your rheumatologist for further workup    Diagnosis: Right knee pain  Assessment and Plan of Treatment: You had Right knee pain which improved with tylenol and ibuprofen. Xray showed no fractures but there was  a small effusion. Please follow up with orthopedics and pcp upon discharge if the pain worsens    Diagnosis: Atrial fibrillation  Assessment and Plan of Treatment: Continue your medications as prescribed. Follow up with your cardiologist regarding anticoagulation for your atrial fibrillation    Diagnosis: Imaging abnormality  Assessment and Plan of Treatment: Your CT showed chronic hydronephrosis which is unchanged, which is from urine back up into your kidneys. You were also found to have a cystic lesion in your retroperitoneal area. Follow up with your pcp to determine if an MRI is necessary in the future.

## 2023-01-24 LAB — SARS-COV-2 RNA SPEC QL NAA+PROBE: SIGNIFICANT CHANGE UP

## 2023-01-24 PROCEDURE — 99239 HOSP IP/OBS DSCHRG MGMT >30: CPT

## 2023-01-24 RX ADMIN — Medication 1 APPLICATION(S): at 17:56

## 2023-01-24 RX ADMIN — ENOXAPARIN SODIUM 40 MILLIGRAM(S): 100 INJECTION SUBCUTANEOUS at 05:01

## 2023-01-24 RX ADMIN — Medication 650 MILLIGRAM(S): at 22:00

## 2023-01-24 RX ADMIN — ATORVASTATIN CALCIUM 20 MILLIGRAM(S): 80 TABLET, FILM COATED ORAL at 21:17

## 2023-01-24 RX ADMIN — SENNA PLUS 2 TABLET(S): 8.6 TABLET ORAL at 21:17

## 2023-01-24 RX ADMIN — TAMSULOSIN HYDROCHLORIDE 0.4 MILLIGRAM(S): 0.4 CAPSULE ORAL at 21:17

## 2023-01-24 RX ADMIN — Medication 400 MILLIGRAM(S): at 11:51

## 2023-01-24 RX ADMIN — Medication 650 MILLIGRAM(S): at 21:18

## 2023-01-24 RX ADMIN — PANTOPRAZOLE SODIUM 40 MILLIGRAM(S): 20 TABLET, DELAYED RELEASE ORAL at 05:00

## 2023-01-24 RX ADMIN — MIRTAZAPINE 7.5 MILLIGRAM(S): 45 TABLET, ORALLY DISINTEGRATING ORAL at 11:47

## 2023-01-24 RX ADMIN — Medication 25 MILLIGRAM(S): at 11:47

## 2023-01-24 RX ADMIN — Medication 400 MILLIGRAM(S): at 10:51

## 2023-01-24 RX ADMIN — Medication 100 MILLIGRAM(S): at 17:55

## 2023-01-24 RX ADMIN — Medication 1 APPLICATION(S): at 05:00

## 2023-01-24 RX ADMIN — MODAFINIL 100 MILLIGRAM(S): 200 TABLET ORAL at 11:47

## 2023-01-24 RX ADMIN — Medication 25 MILLIGRAM(S): at 23:57

## 2023-01-24 RX ADMIN — Medication 81 MILLIGRAM(S): at 11:47

## 2023-01-24 RX ADMIN — Medication 1000 UNIT(S): at 11:47

## 2023-01-24 RX ADMIN — BUDESONIDE AND FORMOTEROL FUMARATE DIHYDRATE 2 PUFF(S): 160; 4.5 AEROSOL RESPIRATORY (INHALATION) at 21:19

## 2023-01-24 RX ADMIN — BUDESONIDE AND FORMOTEROL FUMARATE DIHYDRATE 2 PUFF(S): 160; 4.5 AEROSOL RESPIRATORY (INHALATION) at 05:01

## 2023-01-24 RX ADMIN — Medication 100 MILLIGRAM(S): at 05:00

## 2023-01-24 NOTE — PROGRESS NOTE ADULT - ASSESSMENT
Pt is a 83yo M admitted to [place] for fever and elevated BP.     *metabolic encephalopathy with Fever with leukocytosis  no known source, UA neg, CT chest w/o PNA, BCx NGTD. CT stone tavares shows a retroperitoneal cystic lesion is mildly increased in size; can obtain outpatient MRI  Xray knee shows small effusion, Xray R  hip w/o significant findings  S/p 3 day course of ceftriaxone  ID recs appreciated, monitor off abx  WBC improved  AMS resolved   CT head neg   ESR, CRP elevated however TOMI neg. Possibly in the setting of psoriasis?   Neurology consult appreciated    *Elevated bp   Cont Flecainide 100mg bid and metoprolol 25mg BID  better controlled  cardio consult appreciated  Echo noted   resume HCTZ    *pAfib w hx of SVT   Rate controlled   Cont flecainide, metoprolol 25mg BID  not on AC   Cardio consult appreciated    *BPH  cont Flomax and Finasteride     *HLD  Cont statin     *hypoK  replaced     *Debility with body pain  Daily PT ordered  Xray knee shows small R effusion  Xray hip without acute findings  Per daughter pt would prefer Motrin  PT recommending [facility]    Updated daughter, would prefer avoiding opiates. Will alternate between tylenol and ibuprofen for now

## 2023-01-24 NOTE — SOCIAL WORK PROGRESS NOTE - NSSWPROGRESSNOTE_GEN_ALL_CORE
SW spoke with pt's daughter Kenya  who has requested [facility] referrals go to [facility]. SW educated family on transportation needs [facility] vs [facility] and criteria for each as well as costs associated with [facility]. Daughter agreeable to call in credit card payment when needed.  SW continuing to follow and assist in transition to [facility].

## 2023-01-24 NOTE — CHART NOTE - NSCHARTNOTEFT_GEN_A_CORE
Assessment: As per H&P "The pt is on 83 y/o M with PMH HTN, a fib, HLD, SVT, BPH CVA presents to ED from the [facility] for AMS, HTN and fever. Per EMS suspected UTI as her has been urinating more frequently. Pt states he feels "off". Denies HA, chest pain, SOB, nausea vomiting. States "they told me my BP was high". As per RN pt c.o R knee pain. Pt states he fell a few days ago. Found to be febrile 101 rectally in ED. Daughter reports that patient's sleep-wake cycle has been off the past couple of weeks -- he's been sleeping more during the day and is up at night. She reports that he's missed several meals in the [facility] dining room. He usually walks independently with a walker. She saw him on 1/14/23 and he seemed to be in his usual state of health. Currently in ER patient is back to his baseline -- he is A & O x 4. Daughter reports that patient was recently taken off his BP meds because his BP was running low. He has a known h/o L UPJ obstruction -- he had seen a urologist and told that no further intervention was needed at that time."    1/24  Pt seen for nutrition follow-up. Visited patient in room, tired and wanted to go back to sleep. Presents with fair appetite/po intake, consuming >50-75% of meals during hospital stay. Denies n/v/d/c, last BM 1/22, bowel regimen in place. Pertinent medications/nutrition labs reviewed; will add Ensure High Protein plus 8oz (350 kcal, 20g protein) qd to optimize intake (lactose free), encourage intake as needed. RD to continue to monitor nutrition status per protocol.     Factors impacting intake: [ ] none [ ] nausea  [ ] vomiting [ ] diarrhea [ ] constipation  [ ]chewing problems [ ] swallowing issues  [x ] other: lethargic    Diet Prescription: Diet, DASH/TLC:   Sodium & Cholesterol Restricted  Lactose Restricted (Milk Sugar Intoler.) (01-17-23 @ 21:37)    Intake: fair 50-75%    Daily   % Weight Change    Pertinent Medications: MEDICATIONS  (STANDING):  aspirin enteric coated 81 milliGRAM(s) Oral daily  atorvastatin 20 milliGRAM(s) Oral at bedtime  budesonide 160 MICROgram(s)/formoterol 4.5 MICROgram(s) Inhaler 2 Puff(s) Inhalation two times a day  cholecalciferol 1000 Unit(s) Oral daily  clobetasol 0.05% Cream 1 Application(s) Topical two times a day  enoxaparin Injectable 40 milliGRAM(s) SubCutaneous every 24 hours  flecainide 100 milliGRAM(s) Oral every 12 hours  hydrochlorothiazide 25 milliGRAM(s) Oral daily  metoprolol tartrate 25 milliGRAM(s) Oral every 12 hours  mirtazapine 7.5 milliGRAM(s) Oral daily  modafinil 100 milliGRAM(s) Oral daily  pantoprazole    Tablet 40 milliGRAM(s) Oral before breakfast  polyethylene glycol 3350 17 Gram(s) Oral two times a day  senna 2 Tablet(s) Oral at bedtime  tamsulosin 0.4 milliGRAM(s) Oral at bedtime    MEDICATIONS  (PRN):  acetaminophen     Tablet .. 650 milliGRAM(s) Oral every 6 hours PRN Temp greater or equal to 38C (100.4F), Mild Pain (1 - 3)  ibuprofen  Tablet. 400 milliGRAM(s) Oral every 6 hours PRN Moderate Pain (4 - 6)  melatonin 3 milliGRAM(s) Oral at bedtime PRN Insomnia    Pertinent Labs: 01-22 Na140 mmol/L Glu 91 mg/dL K+ 3.8 mmol/L Cr  0.87 mg/dL BUN 32 mg/dL<H> 01-22 Phos 3.2 mg/dL 01-18 Alb 2.6 g/dL<L>     CAPILLARY BLOOD GLUCOSE      Edema per flowsheets: +1 right knee  Skin: pressure injury stage 1 to sacrum      Estimated Needs:   [ x] no change since previous assessment  [ ] recalculated:     Previous Nutrition Diagnosis:   [ ] Inadequate Energy Intake [ ]Inadequate Oral Intake [ ] Excessive Energy Intake   [ ] Underweight [ ] Increased Nutrient Needs [ ] Overweight/Obesity [ ] Altered Nutrition related lab values  [ ] Altered GI Function [ ] Unintended Weight Loss [ ] Food & Nutrition Related Knowledge Deficit [x ] Malnutrition     Nutrition Diagnosis is [ x] ongoing  [ ] resolved [ ] not applicable     New Nutrition Diagnosis: [ x] not applicable       Interventions: will add Ensure High Protein plus 8oz (350 kcal, 20g protein) qd to optimize intake (lactose free), encourage intake as needed.  Recommend  [ ] Change Diet To:  [ ] Nutrition Supplement  [ ] Nutrition Support  [ ] Other:     Monitoring and Evaluation:   [X ] Intake [ x ] Tolerance to diet prescription [ x ] weights [ x ] labs[ x ] follow up per protocol  [ ] other:

## 2023-01-24 NOTE — PROGRESS NOTE ADULT - ASSESSMENT
The patient is an 82 year old male with a history of paroxysmal atrial fibrillation, HTN, SVT, CVA who presents with AMS, HTN, fever.    Plan:  - ECG with sinus rhythm and underlying RBBB  - As per daughter, the patient had a recent stress test and echo that were normal  - Continue flecainide 100 mg bid  - Continue metoprolol tartrate 25 mg bid which should be used in combination with flecainide and also for HTN  - Hold HCTZ; resume on discharge  - Not on anticoagulation for atrial fibrillation - defer to outpatient cardiologist. Presumably there has not been recent episodes of atrial fibrillation.  - Continue aspirin 81 mg daily  - Completed antibiotics  - Discharge planning

## 2023-01-25 PROCEDURE — 99233 SBSQ HOSP IP/OBS HIGH 50: CPT

## 2023-01-25 RX ADMIN — ENOXAPARIN SODIUM 40 MILLIGRAM(S): 100 INJECTION SUBCUTANEOUS at 05:20

## 2023-01-25 RX ADMIN — BUDESONIDE AND FORMOTEROL FUMARATE DIHYDRATE 2 PUFF(S): 160; 4.5 AEROSOL RESPIRATORY (INHALATION) at 06:43

## 2023-01-25 RX ADMIN — Medication 1000 UNIT(S): at 11:20

## 2023-01-25 RX ADMIN — Medication 400 MILLIGRAM(S): at 04:39

## 2023-01-25 RX ADMIN — BUDESONIDE AND FORMOTEROL FUMARATE DIHYDRATE 2 PUFF(S): 160; 4.5 AEROSOL RESPIRATORY (INHALATION) at 18:28

## 2023-01-25 RX ADMIN — MODAFINIL 100 MILLIGRAM(S): 200 TABLET ORAL at 11:21

## 2023-01-25 RX ADMIN — Medication 1 APPLICATION(S): at 05:24

## 2023-01-25 RX ADMIN — POLYETHYLENE GLYCOL 3350 17 GRAM(S): 17 POWDER, FOR SOLUTION ORAL at 05:21

## 2023-01-25 RX ADMIN — TAMSULOSIN HYDROCHLORIDE 0.4 MILLIGRAM(S): 0.4 CAPSULE ORAL at 21:16

## 2023-01-25 RX ADMIN — Medication 1 APPLICATION(S): at 17:41

## 2023-01-25 RX ADMIN — MIRTAZAPINE 7.5 MILLIGRAM(S): 45 TABLET, ORALLY DISINTEGRATING ORAL at 11:20

## 2023-01-25 RX ADMIN — SENNA PLUS 2 TABLET(S): 8.6 TABLET ORAL at 21:16

## 2023-01-25 RX ADMIN — Medication 81 MILLIGRAM(S): at 11:20

## 2023-01-25 RX ADMIN — Medication 100 MILLIGRAM(S): at 05:21

## 2023-01-25 RX ADMIN — Medication 400 MILLIGRAM(S): at 10:55

## 2023-01-25 RX ADMIN — ATORVASTATIN CALCIUM 20 MILLIGRAM(S): 80 TABLET, FILM COATED ORAL at 21:16

## 2023-01-25 RX ADMIN — Medication 400 MILLIGRAM(S): at 09:55

## 2023-01-25 RX ADMIN — Medication 25 MILLIGRAM(S): at 21:16

## 2023-01-25 RX ADMIN — Medication 25 MILLIGRAM(S): at 11:20

## 2023-01-25 RX ADMIN — PANTOPRAZOLE SODIUM 40 MILLIGRAM(S): 20 TABLET, DELAYED RELEASE ORAL at 05:24

## 2023-01-25 RX ADMIN — Medication 100 MILLIGRAM(S): at 17:41

## 2023-01-25 NOTE — PROGRESS NOTE ADULT - ASSESSMENT
Pt is a 83yo M admitted to [place] for fever and elevated BP.     *metabolic encephalopathy with Fever with leukocytosis  no known source, UA neg, CT chest w/o PNA, BCx NGTD. CT stone tavares shows a retroperitoneal cystic lesion is mildly increased in size; can obtain outpatient MRI  Xray knee shows small effusion, Xray R  hip w/o significant findings  S/p 3 day course of ceftriaxone  ID recs appreciated, monitor off abx  WBC improved  AMS resolved   CT head neg   ESR, CRP elevated however TOMI neg. Possibly in the setting of psoriasis?   Neurology consult appreciated  dispo planning    *Elevated bp   Cont Flecainide 100mg bid and metoprolol 25mg BID  better controlled  cardio consult appreciated  Echo noted   resume HCTZ    *pAfib w hx of SVT   Rate controlled   Cont flecainide, metoprolol 25mg BID  not on AC   Cardio consult appreciated    *BPH  cont Flomax and Finasteride     *HLD  Cont statin     *hypoK  replaced     *Debility with body pain  Daily PT ordered  Xray knee shows small R effusion  Xray hip without acute findings  Per daughter pt would prefer Motrin  PT recommending [facility]  dispo planning

## 2023-01-25 NOTE — SOCIAL WORK PROGRESS NOTE - NSSWPROGRESSNOTE_GEN_ALL_CORE
Pt has been accepted to [facility] for Thursday 1/26 when they will have a male bed.  Pts daughter Kenya aware and in agreement.  She is also aware that she will need to pay for ambulette  transport.  SW to remain available.

## 2023-01-26 VITALS
SYSTOLIC BLOOD PRESSURE: 138 MMHG | OXYGEN SATURATION: 93 % | RESPIRATION RATE: 18 BRPM | TEMPERATURE: 98 F | HEART RATE: 67 BPM | DIASTOLIC BLOOD PRESSURE: 74 MMHG

## 2023-01-26 PROCEDURE — 70450 CT HEAD/BRAIN W/O DYE: CPT | Mod: MA

## 2023-01-26 PROCEDURE — 97161 PT EVAL LOW COMPLEX 20 MIN: CPT

## 2023-01-26 PROCEDURE — 83735 ASSAY OF MAGNESIUM: CPT

## 2023-01-26 PROCEDURE — 80053 COMPREHEN METABOLIC PANEL: CPT

## 2023-01-26 PROCEDURE — 87086 URINE CULTURE/COLONY COUNT: CPT

## 2023-01-26 PROCEDURE — 36415 COLL VENOUS BLD VENIPUNCTURE: CPT

## 2023-01-26 PROCEDURE — 85027 COMPLETE CBC AUTOMATED: CPT

## 2023-01-26 PROCEDURE — 71250 CT THORAX DX C-: CPT

## 2023-01-26 PROCEDURE — 74176 CT ABD & PELVIS W/O CONTRAST: CPT | Mod: MA

## 2023-01-26 PROCEDURE — 73562 X-RAY EXAM OF KNEE 3: CPT

## 2023-01-26 PROCEDURE — 96375 TX/PRO/DX INJ NEW DRUG ADDON: CPT

## 2023-01-26 PROCEDURE — 81001 URINALYSIS AUTO W/SCOPE: CPT

## 2023-01-26 PROCEDURE — 85610 PROTHROMBIN TIME: CPT

## 2023-01-26 PROCEDURE — 86038 ANTINUCLEAR ANTIBODIES: CPT

## 2023-01-26 PROCEDURE — 84100 ASSAY OF PHOSPHORUS: CPT

## 2023-01-26 PROCEDURE — 73502 X-RAY EXAM HIP UNI 2-3 VIEWS: CPT

## 2023-01-26 PROCEDURE — 71045 X-RAY EXAM CHEST 1 VIEW: CPT

## 2023-01-26 PROCEDURE — 85652 RBC SED RATE AUTOMATED: CPT

## 2023-01-26 PROCEDURE — 87635 SARS-COV-2 COVID-19 AMP PRB: CPT

## 2023-01-26 PROCEDURE — 85025 COMPLETE CBC W/AUTO DIFF WBC: CPT

## 2023-01-26 PROCEDURE — 97110 THERAPEUTIC EXERCISES: CPT

## 2023-01-26 PROCEDURE — 97530 THERAPEUTIC ACTIVITIES: CPT

## 2023-01-26 PROCEDURE — 97116 GAIT TRAINING THERAPY: CPT

## 2023-01-26 PROCEDURE — 87040 BLOOD CULTURE FOR BACTERIA: CPT

## 2023-01-26 PROCEDURE — 87640 STAPH A DNA AMP PROBE: CPT

## 2023-01-26 PROCEDURE — 93005 ELECTROCARDIOGRAM TRACING: CPT

## 2023-01-26 PROCEDURE — 93306 TTE W/DOPPLER COMPLETE: CPT

## 2023-01-26 PROCEDURE — 96365 THER/PROPH/DIAG IV INF INIT: CPT

## 2023-01-26 PROCEDURE — 85730 THROMBOPLASTIN TIME PARTIAL: CPT

## 2023-01-26 PROCEDURE — 94640 AIRWAY INHALATION TREATMENT: CPT

## 2023-01-26 PROCEDURE — 83605 ASSAY OF LACTIC ACID: CPT

## 2023-01-26 PROCEDURE — 99285 EMERGENCY DEPT VISIT HI MDM: CPT

## 2023-01-26 PROCEDURE — 80048 BASIC METABOLIC PNL TOTAL CA: CPT

## 2023-01-26 PROCEDURE — 84145 PROCALCITONIN (PCT): CPT

## 2023-01-26 PROCEDURE — 87637 SARSCOV2&INF A&B&RSV AMP PRB: CPT

## 2023-01-26 PROCEDURE — 84443 ASSAY THYROID STIM HORMONE: CPT

## 2023-01-26 PROCEDURE — 86140 C-REACTIVE PROTEIN: CPT

## 2023-01-26 PROCEDURE — 87641 MR-STAPH DNA AMP PROBE: CPT

## 2023-01-26 RX ADMIN — PANTOPRAZOLE SODIUM 40 MILLIGRAM(S): 20 TABLET, DELAYED RELEASE ORAL at 05:35

## 2023-01-26 RX ADMIN — POLYETHYLENE GLYCOL 3350 17 GRAM(S): 17 POWDER, FOR SOLUTION ORAL at 05:35

## 2023-01-26 RX ADMIN — MODAFINIL 100 MILLIGRAM(S): 200 TABLET ORAL at 12:50

## 2023-01-26 RX ADMIN — MIRTAZAPINE 7.5 MILLIGRAM(S): 45 TABLET, ORALLY DISINTEGRATING ORAL at 12:50

## 2023-01-26 RX ADMIN — Medication 100 MILLIGRAM(S): at 05:34

## 2023-01-26 RX ADMIN — Medication 25 MILLIGRAM(S): at 12:49

## 2023-01-26 RX ADMIN — Medication 1000 UNIT(S): at 12:49

## 2023-01-26 RX ADMIN — Medication 81 MILLIGRAM(S): at 12:50

## 2023-01-26 RX ADMIN — BUDESONIDE AND FORMOTEROL FUMARATE DIHYDRATE 2 PUFF(S): 160; 4.5 AEROSOL RESPIRATORY (INHALATION) at 05:35

## 2023-01-26 RX ADMIN — ENOXAPARIN SODIUM 40 MILLIGRAM(S): 100 INJECTION SUBCUTANEOUS at 05:34

## 2023-01-26 RX ADMIN — Medication 1 APPLICATION(S): at 05:35

## 2023-01-26 NOTE — PROGRESS NOTE ADULT - REASON FOR ADMISSION
AMS, fever, high BP

## 2023-01-26 NOTE — PROGRESS NOTE ADULT - ASSESSMENT
Pt is a 81yo M admitted to [place] for fever and elevated BP.     *metabolic encephalopathy with Fever with leukocytosis  no known source, UA neg, CT chest w/o PNA, BCx NGTD. CT stone tavares shows a retroperitoneal cystic lesion is mildly increased in size; can obtain outpatient MRI  Xray knee shows small effusion, Xray R hip w/o significant findings  S/p 3 day course of ceftriaxone  ID recs appreciated, monitor off abx  WBC improved  AMS resolved   CT head neg   ESR, CRP elevated however TOMI neg. Possibly in the setting of psoriasis?   Neurology consult appreciated  dispo planning    *Elevated bp   Cont Flecainide 100mg bid and metoprolol 25mg BID  better controlled  cardio consult appreciated  Echo noted   resume HCTZ    *pAfib w hx of SVT   Rate controlled   Cont flecainide, metoprolol 25mg BID  not on AC   Cardio consult appreciated    *BPH  cont Flomax and Finasteride     *HLD  Cont statin     *hypoK  replaced     *Debility with body pain  Daily PT ordered  Xray knee shows small R effusion  Xray hip without acute findings  Per daughter pt would prefer Motrin  PT recommending [facility]  dispo planning

## 2023-01-26 NOTE — SOCIAL WORK PROGRESS NOTE - NSSWPROGRESSNOTE_GEN_ALL_CORE
Pt is medically stable for dc today. Dtr Kenya requested [facility] and pt was verbally accepted by Wanda of [facility] this morning. Pt is scheduled for dc to [facility] at 1pm today via ambulance. Pt's dtr Leta Zambrano team and Wanda of [facility] notified.

## 2023-01-26 NOTE — PROGRESS NOTE ADULT - NUTRITIONAL ASSESSMENT
This patient has been assessed with a concern for Malnutrition and has been determined to have a diagnosis/diagnoses of Severe protein-calorie malnutrition.    This patient is being managed with:   Diet DASH/TLC-  Sodium & Cholesterol Restricted  Lactose Restricted (Milk Sugar Intoler.)  Supplement Feeding Modality:  Oral  Ensure Plus High Protein Cans or Servings Per Day:  1       Frequency:  Daily  Entered: Jan 24 2023  1:54PM    Diet DASH/TLC-  Sodium & Cholesterol Restricted  Lactose Restricted (Milk Sugar Intoler.)  Entered: Jan 17 2023  9:36PM    The following pending diet order is being considered for treatment of Severe protein-calorie malnutrition:null
This patient has been assessed with a concern for Malnutrition and has been determined to have a diagnosis/diagnoses of Severe protein-calorie malnutrition.    This patient is being managed with:   Diet DASH/TLC-  Sodium & Cholesterol Restricted  Lactose Restricted (Milk Sugar Intoler.)  Supplement Feeding Modality:  Oral  Ensure Plus High Protein Cans or Servings Per Day:  1       Frequency:  Daily  Entered: Jan 24 2023  1:54PM    Diet DASH/TLC-  Sodium & Cholesterol Restricted  Lactose Restricted (Milk Sugar Intoler.)  Entered: Jan 17 2023  9:36PM    The following pending diet order is being considered for treatment of Severe protein-calorie malnutrition:null
This patient has been assessed with a concern for Malnutrition and has been determined to have a diagnosis/diagnoses of Severe protein-calorie malnutrition.    This patient is being managed with:   Diet DASH/TLC-  Sodium & Cholesterol Restricted  Lactose Restricted (Milk Sugar Intoler.)  Entered: Jan 17 2023  9:36PM    

## 2023-01-26 NOTE — PROGRESS NOTE ADULT - SUBJECTIVE AND OBJECTIVE BOX
DONGSAMANTA MENDOSA is a 82yMale , patient examined and chart reviewed.     INTERVAL HPI/ OVERNIGHT EVENTS:   NAD Feeling better. No events.  Afebrile.    PAST MEDICAL & SURGICAL HISTORY:  Hypertension  Atrial fibrillation  Asthma  No significant past surgical history        For details regarding the patient's social history, family history, and other miscellaneous elements, please refer the initial infectious diseases consultation and/or the admitting history and physical examination for this admission.      ROS:  CONSTITUTIONAL:  Negative fever or chills + weakness  EYES:  Negative  blurry vision or double vision  CARDIOVASCULAR:  Negative for chest pain or palpitations  RESPIRATORY:  Negative for cough, wheezing, or SOB   GASTROINTESTINAL:  Negative for nausea, vomiting, diarrhea, constipation, or abdominal pain  GENITOURINARY:  Negative frequency, urgency or dysuria  NEUROLOGIC:  No headache, confusion, dizziness, lightheadedness  All other systems were reviewed and are negative         Current inpatient medications :    ANTIBIOTICS/RELEVANT:    MEDICATIONS  (STANDING):  aspirin enteric coated 81 milliGRAM(s) Oral daily  atorvastatin 20 milliGRAM(s) Oral at bedtime  budesonide 160 MICROgram(s)/formoterol 4.5 MICROgram(s) Inhaler 2 Puff(s) Inhalation two times a day  cholecalciferol 1000 Unit(s) Oral daily  clobetasol 0.05% Cream 1 Application(s) Topical two times a day  enoxaparin Injectable 40 milliGRAM(s) SubCutaneous every 24 hours  flecainide 100 milliGRAM(s) Oral every 12 hours  hydrochlorothiazide 25 milliGRAM(s) Oral daily  metoprolol tartrate 25 milliGRAM(s) Oral every 12 hours  mirtazapine 7.5 milliGRAM(s) Oral daily  modafinil 100 milliGRAM(s) Oral daily  pantoprazole    Tablet 40 milliGRAM(s) Oral before breakfast  polyethylene glycol 3350 17 Gram(s) Oral two times a day  senna 2 Tablet(s) Oral at bedtime  tamsulosin 0.4 milliGRAM(s) Oral at bedtime    MEDICATIONS  (PRN):  acetaminophen     Tablet .. 650 milliGRAM(s) Oral every 6 hours PRN Temp greater or equal to 38C (100.4F), Mild Pain (1 - 3)  ALPRAZolam 0.25 milliGRAM(s) Oral every 12 hours PRN anxiety  ibuprofen  Tablet. 400 milliGRAM(s) Oral every 6 hours PRN Moderate Pain (4 - 6)  melatonin 3 milliGRAM(s) Oral at bedtime PRN Insomnia        Objective:  Vital Signs Last 24 Hrs  T(C): 36.6 (26 Jan 2023 12:09), Max: 36.7 (26 Jan 2023 05:18)  T(F): 97.9 (26 Jan 2023 12:09), Max: 98 (26 Jan 2023 05:18)  HR: 67 (26 Jan 2023 12:09) (61 - 67)  BP: 138/74 (26 Jan 2023 12:09) (127/72 - 147/82)  RR: 18 (26 Jan 2023 12:09) (18 - 18)  SpO2: 93% (26 Jan 2023 12:09) (93% - 93%)    Parameters below as of 26 Jan 2023 12:09  Patient On (Oxygen Delivery Method): room air    Physical Exam:  General: weak no acute distress  Neck: supple, trachea midline  Lungs: clear, no wheeze/rhonchi  Cardiovascular: regular rate and rhythm, S1 S2  Abdomen: soft, nontender,  bowel sounds normal  Neurological: alert and oriented x3  Skin: no rash  Extremities: trace edema      LABS:        MICROBIOLOGY:  Culture - Urine (collected 17 Jan 2023 17:55)  Source: Clean Catch Clean Catch (Midstream)  Final Report (18 Jan 2023 22:28):    No growth    Culture - Blood (collected 17 Jan 2023 13:59)  Source: .Blood Blood-Peripheral  Preliminary Report (18 Jan 2023 18:01):    No growth to date.    Culture - Blood (collected 17 Jan 2023 13:59)  Source: .Blood Blood-Peripheral  Preliminary Report (18 Jan 2023 18:01):    No growth to date.      RADIOLOGY & ADDITIONAL STUDIES:    ACC: [id number] EXAM:  CT CHEST   ORDERED BY: HARLEY LLANES     PROCEDURE DATE:  01/19/2023          INTERPRETATION:  CLINICAL INFORMATION: Fever.    COMPARISON: CT chest from 6/12/2021.    CONTRAST/COMPLICATIONS:  IV Contrast: NONE  Oral Contrast: NONE  Complications: None reported at time of study completion    PROCEDURE:  CT scan of the chest was obtained without intravenous contrast.    FINDINGS:    LYMPH NODES: No lymphadenopathy.    HEART/VASCULATURE: Heart size is within normal limits. No pericardial   effusion. Atherosclerotic calcifications of the coronary arteries and   thoracic aorta.    AIRWAYS/LUNGS/PLEURA: The central airways are patent. Bronchial wall   thickening within the lower lobes. Bibasilar subsegmental atelectasis. No   focal consolidations. 3 mm calcified granuloma in the right upper lobe   (3:65). Right middle lobe 6 mm nodule with a pleural attachment (3:89),   stable. Right lower lobe 5 mm nodule (3:92), stable.    UPPER ABDOMEN: Stable left renal pelviectasis. Nonobstructing 2 mm stone   in the right kidney lower pole. Diverticulosis.    BONES/SOFT TISSUES: Chronic healed left fourth through sixth rib   fractures. Mild degenerative changes of the thoracic spine.    IMPRESSION:    Bibasilar subsegmental atelectasis. No consolidation.      ACC: [id number] EXAM:  XR KNEE AP LAT OBL 3 VIEWS RT                        ACC: [id number] EXAM:  XR CHEST PORTABLE IMMED 1V                          PROCEDURE DATE:  01/17/2023          INTERPRETATION:  AP chest on January 17, 2020 3:02 PM. Patient has sepsis.    Heart suggest normal size.    On June 12, 2021 there was slight left lower lung infiltrates and there   is only minimal residual linear atelectasis at this time.    Right knee. 3 views. Patient has local pain.    Small effusion is suspected.    There are calcified menisci. No fracture.    IMPRESSION: No acute chest finding. Small right knee effusion and mild   degeneration.      ACC: [id number] EXAM:  CT RENAL STONE HUNT                          PROCEDURE DATE:  01/17/2023          INTERPRETATION:  CLINICAL INFORMATION: fever SCT    COMPARISON: 6.12.21.    CONTRAST/COMPLICATIONS:  IV Contrast: None  Oral Contrast: None  Complications: None    PROCEDURE:  CT of the Abdomen and Pelvis was performed.  Sagittal and coronal reformats were performed.    FINDINGS:    LOWER CHEST: A 3 mm right lower lobe nodule is unchanged.    LIVER: Within normal limits.  BILE DUCTS: Normal caliber.  GALLBLADDER: Within normal limits.  SPLEEN: Within normal limits.  PANCREAS: Within normal limits.  ADRENALS: Within normal limits.  KIDNEYS/URETERS: Chronic left UPJ obstruction with moderate   hydronephrosis is unchanged. No renal calculus.    BLADDER: Small diverticulum.  REPRODUCTIVE ORGANS: The prostate is enlarged.    BOWEL: No bowel obstruction.  PERITONEUM: No ascites.  VESSELS:  Within normal limits.  RETROPERITONEUM/LYMPH NODES: A 3.7 x 1.9 cm retroperitoneal cystic lesion   on series 3 image 81 previously 2.7 x 1.2 cm.  ABDOMINAL WALL: Within normal limits.  BONES: Within normal limits.    IMPRESSION: Chronic left UPJ obstruction with moderate hydronephrosis is   unchanged. No renal calculus.    A retroperitoneal cystic lesion is mildly increased in size; consider   follow-up MRI.      Assessment :   83 y/o M  PMH HTN, a fib, HLD, SVT, BPH CVA psoriasis resident of [facility]  AMS, HTN admitted with weakness acute febrile illness. wbc 17K febrile to 101. UA neg CT AP neg except for chronic  L UPJ obstruction with chronic hydro. CT chest neg for pna.  c/o joint pains/ Right > Left.  Thus far work up unrevealing  ?viral   ?inflammatory process  Cultures NGTD  WBC better- normalised  Fevers resolved  Clinically better    Plan :   Monitor off antibiotics  Trend temps and cbc  Pulm toileting  Asp precautions  Increase activity  PT   Stable from ID standpoint  Dc planning to rehab      Continue with present regiment.  Appropriate use of antibiotics and adverse effects reviewed.    > 35 minutes were spent in direct patient care reviewing notes, medications ,labs data/ imaging , discussion with multidisciplinary team.    Thank you for allowing me to participate in care of your patient .    Edel Llanes MD  Infectious Disease  [phone]
     SAMANTA BORWN is a yMale , patient examined and chart reviewed.     INTERVAL HPI/ OVERNIGHT EVENTS:   No events. NAD  Afebrile.    PAST MEDICAL & SURGICAL HISTORY:  Hypertension  Atrial fibrillation  Asthma  No significant past surgical history        For details regarding the patient's social history, family history, and other miscellaneous elements, please refer the initial infectious diseases consultation and/or the admitting history and physical examination for this admission.      ROS:  CONSTITUTIONAL:  Negative fever or chills + weakness  EYES:  Negative  blurry vision or double vision  CARDIOVASCULAR:  Negative for chest pain or palpitations  RESPIRATORY:  Negative for cough, wheezing, or SOB   GASTROINTESTINAL:  Negative for nausea, vomiting, diarrhea, constipation, or abdominal pain  GENITOURINARY:  Negative frequency, urgency or dysuria  NEUROLOGIC:  No headache, confusion, dizziness, lightheadedness  All other systems were reviewed and are negative         Current inpatient medications :    ANTIBIOTICS/RELEVANT:    MEDICATIONS  (STANDING):  aspirin enteric coated 81 milliGRAM(s) Oral daily  atorvastatin 20 milliGRAM(s) Oral at bedtime  budesonide 160 MICROgram(s)/formoterol 4.5 MICROgram(s) Inhaler 2 Puff(s) Inhalation two times a day  cholecalciferol 1000 Unit(s) Oral daily  clobetasol 0.05% Cream 1 Application(s) Topical two times a day  enoxaparin Injectable 40 milliGRAM(s) SubCutaneous every 24 hours  flecainide 100 milliGRAM(s) Oral every 12 hours  hydrochlorothiazide 25 milliGRAM(s) Oral daily  metoprolol tartrate 25 milliGRAM(s) Oral every 12 hours  mirtazapine 7.5 milliGRAM(s) Oral daily  modafinil 100 milliGRAM(s) Oral daily  pantoprazole    Tablet 40 milliGRAM(s) Oral before breakfast  polyethylene glycol 3350 17 Gram(s) Oral two times a day  senna 2 Tablet(s) Oral at bedtime  tamsulosin 0.4 milliGRAM(s) Oral at bedtime    MEDICATIONS  (PRN):  acetaminophen     Tablet .. 650 milliGRAM(s) Oral every 6 hours PRN Temp greater or equal to 38C (100.4F), Mild Pain (1 - 3)  ALPRAZolam 0.25 milliGRAM(s) Oral every 12 hours PRN anxiety  ibuprofen  Tablet. 400 milliGRAM(s) Oral every 6 hours PRN Moderate Pain (4 - 6)  melatonin 3 milliGRAM(s) Oral at bedtime PRN Insomnia        Objective:  Vital Signs Last 24 Hrs  T(C): 36.7 (25 Jan 2023 12:12), Max: 36.7 (24 Jan 2023 21:08)  T(F): 98 (25 Jan 2023 12:12), Max: 98.1 (24 Jan 2023 21:08)  HR: 62 (25 Jan 2023 12:12) (60 - 62)  BP: 107/66 (25 Jan 2023 12:12) (107/66 - 152/78)  RR: 18 (25 Jan 2023 12:12) (18 - 19)  SpO2: 94% (25 Jan 2023 12:12) (94% - 96%)    Parameters below as of 25 Jan 2023 12:12  Patient On (Oxygen Delivery Method): room air      Physical Exam:  General: weak no acute distress  Neck: supple, trachea midline  Lungs: clear, no wheeze/rhonchi  Cardiovascular: regular rate and rhythm, S1 S2  Abdomen: soft, nontender,  bowel sounds normal  Neurological: alert and oriented x3  Skin: no rash  Extremities: trace edema      LABS:        MICROBIOLOGY:  Culture - Urine (collected 17 Jan 2023 17:55)  Source: Clean Catch Clean Catch (Midstream)  Final Report (18 Jan 2023 22:28):    No growth    Culture - Blood (collected 17 Jan 2023 13:59)  Source: .Blood Blood-Peripheral  Preliminary Report (18 Jan 2023 18:01):    No growth to date.    Culture - Blood (collected 17 Jan 2023 13:59)  Source: .Blood Blood-Peripheral  Preliminary Report (18 Jan 2023 18:01):    No growth to date.      RADIOLOGY & ADDITIONAL STUDIES:    ACC: [id number] EXAM:  CT CHEST   ORDERED BY: HARLEY LLANES     PROCEDURE DATE:  01/19/2023          INTERPRETATION:  CLINICAL INFORMATION: Fever.    COMPARISON: CT chest from 6/12/2021.    CONTRAST/COMPLICATIONS:  IV Contrast: NONE  Oral Contrast: NONE  Complications: None reported at time of study completion    PROCEDURE:  CT scan of the chest was obtained without intravenous contrast.    FINDINGS:    LYMPH NODES: No lymphadenopathy.    HEART/VASCULATURE: Heart size is within normal limits. No pericardial   effusion. Atherosclerotic calcifications of the coronary arteries and   thoracic aorta.    AIRWAYS/LUNGS/PLEURA: The central airways are patent. Bronchial wall   thickening within the lower lobes. Bibasilar subsegmental atelectasis. No   focal consolidations. 3 mm calcified granuloma in the right upper lobe   (3:65). Right middle lobe 6 mm nodule with a pleural attachment (3:89),   stable. Right lower lobe 5 mm nodule (3:92), stable.    UPPER ABDOMEN: Stable left renal pelviectasis. Nonobstructing 2 mm stone   in the right kidney lower pole. Diverticulosis.    BONES/SOFT TISSUES: Chronic healed left fourth through sixth rib   fractures. Mild degenerative changes of the thoracic spine.    IMPRESSION:    Bibasilar subsegmental atelectasis. No consolidation.      ACC: [id number] EXAM:  XR KNEE AP LAT OBL 3 VIEWS RT                        ACC: [id number] EXAM:  XR CHEST PORTABLE IMMED 1V                          PROCEDURE DATE:  01/17/2023          INTERPRETATION:  AP chest on January 17, 2020 3:02 PM. Patient has sepsis.    Heart suggest normal size.    On June 12, 2021 there was slight left lower lung infiltrates and there   is only minimal residual linear atelectasis at this time.    Right knee. 3 views. Patient has local pain.    Small effusion is suspected.    There are calcified menisci. No fracture.    IMPRESSION: No acute chest finding. Small right knee effusion and mild   degeneration.      ACC: [id number] EXAM:  CT RENAL STONE HUNT                          PROCEDURE DATE:  01/17/2023          INTERPRETATION:  CLINICAL INFORMATION: fever SCT    COMPARISON: 6.12.21.    CONTRAST/COMPLICATIONS:  IV Contrast: None  Oral Contrast: None  Complications: None    PROCEDURE:  CT of the Abdomen and Pelvis was performed.  Sagittal and coronal reformats were performed.    FINDINGS:    LOWER CHEST: A 3 mm right lower lobe nodule is unchanged.    LIVER: Within normal limits.  BILE DUCTS: Normal caliber.  GALLBLADDER: Within normal limits.  SPLEEN: Within normal limits.  PANCREAS: Within normal limits.  ADRENALS: Within normal limits.  KIDNEYS/URETERS: Chronic left UPJ obstruction with moderate   hydronephrosis is unchanged. No renal calculus.    BLADDER: Small diverticulum.  REPRODUCTIVE ORGANS: The prostate is enlarged.    BOWEL: No bowel obstruction.  PERITONEUM: No ascites.  VESSELS:  Within normal limits.  RETROPERITONEUM/LYMPH NODES: A 3.7 x 1.9 cm retroperitoneal cystic lesion   on series 3 image 81 previously 2.7 x 1.2 cm.  ABDOMINAL WALL: Within normal limits.  BONES: Within normal limits.    IMPRESSION: Chronic left UPJ obstruction with moderate hydronephrosis is   unchanged. No renal calculus.    A retroperitoneal cystic lesion is mildly increased in size; consider   follow-up MRI.      Assessment :   83 y/o M  PMH HTN, a fib, HLD, SVT, BPH CVA psoriasis resident of [facility]  AMS, HTN admitted with weakness acute febrile illness. wbc 17K febrile to 101. UA neg CT AP neg except for chronic  L UPJ obstruction with chronic hydro. CT chest neg for pna.  c/o joint pains/ Right > Left.  Thus far work up unrevealing  ?viral   ?inflammatory process  Cultures NGTD  WBC better- normalised  Fevers resolved  Clinically better    Plan :   Monitor off antibiotics  Trend temps and cbc  Pulm toileting  Asp precautions  Increase activity  PT   Stable from ID standpoint  Dc planning to rehab      Continue with present regiment.  Appropriate use of antibiotics and adverse effects reviewed.    > 35 minutes were spent in direct patient care reviewing notes, medications ,labs data/ imaging , discussion with multidisciplinary team.    Thank you for allowing me to participate in care of your patient .    Edel Llanes MD  Infectious Disease  [phone]
     SAMANTA BROWN is a yMale , patient examined and chart reviewed.     INTERVAL HPI/ OVERNIGHT EVENTS:   Still feels weak with pain all over.  No further fevers.     PAST MEDICAL & SURGICAL HISTORY:  Hypertension  Atrial fibrillation  Asthma  No significant past surgical history          For details regarding the patient's social history, family history, and other miscellaneous elements, please refer the initial infectious diseases consultation and/or the admitting history and physical examination for this admission.      ROS:  CONSTITUTIONAL:  Negative fever or chills + weakness  EYES:  Negative  blurry vision or double vision  CARDIOVASCULAR:  Negative for chest pain or palpitations  RESPIRATORY:  Negative for cough, wheezing, or SOB   GASTROINTESTINAL:  Negative for nausea, vomiting, diarrhea, constipation, or abdominal pain  GENITOURINARY:  Negative frequency, urgency or dysuria  NEUROLOGIC:  No headache, confusion, dizziness, lightheadedness  All other systems were reviewed and are negative         Current inpatient medications :    ANTIBIOTICS/RELEVANT:  cefTRIAXone   IVPB 1000 milliGRAM(s) IV Intermittent every 24 hours      acetaminophen     Tablet .. 650 milliGRAM(s) Oral every 6 hours PRN  ALPRAZolam 0.25 milliGRAM(s) Oral every 12 hours PRN  aluminum hydroxide/magnesium hydroxide/simethicone Suspension 30 milliLiter(s) Oral every 4 hours PRN  aspirin enteric coated 81 milliGRAM(s) Oral daily  atorvastatin 20 milliGRAM(s) Oral at bedtime  budesonide 160 MICROgram(s)/formoterol 4.5 MICROgram(s) Inhaler 2 Puff(s) Inhalation two times a day  cholecalciferol 1000 Unit(s) Oral daily  enoxaparin Injectable 40 milliGRAM(s) SubCutaneous every 24 hours  flecainide 100 milliGRAM(s) Oral every 12 hours  hydrALAZINE Injectable 10 milliGRAM(s) IV Push every 6 hours PRN  melatonin 3 milliGRAM(s) Oral at bedtime PRN  metoprolol tartrate 25 milliGRAM(s) Oral every 12 hours  mirtazapine 7.5 milliGRAM(s) Oral daily  modafinil 100 milliGRAM(s) Oral daily  ondansetron Injectable 4 milliGRAM(s) IV Push every 6 hours PRN  pantoprazole    Tablet 40 milliGRAM(s) Oral before breakfast  tamsulosin 0.4 milliGRAM(s) Oral at bedtime      Objective:    T(C): 36.9 (01-19-23 @ 22:02), Max: 37.3 (01-19-23 @ 12:00)  HR: 63 (01-19-23 @ 22:02) (60 - 70)  BP: 114/68 (01-19-23 @ 22:02) (114/68 - 165/71)  RR: 18 (01-19-23 @ 22:02) (17 - 20)  SpO2: 92% (01-19-23 @ 22:02) (92% - 98%)  Wt(kg): --      Physical Exam:  General: weak no acute distress  Neck: supple, trachea midline  Lungs: clear, no wheeze/rhonchi  Cardiovascular: regular rate and rhythm, S1 S2  Abdomen: soft, nontender,  bowel sounds normal  Neurological: alert and oriented x3  Skin: no rash  Extremities: trace edema      LABS:                          13.4   12.79 )-----------( 243      ( 19 Jan 2023 08:17 )             41.3       01-19    143  |  107  |  33<H>  ----------------------------<  106<H>  3.4<L>   |  30  |  1.33<H>    Ca    9.2      19 Jan 2023 08:17  Mg     2.2     01-18    TPro  6.9  /  Alb  2.6<L>  /  TBili  0.8  /  DBili  x   /  AST  16  /  ALT  <10<L>  /  AlkPhos  60  01-18      MICROBIOLOGY:  Culture - Urine (collected 17 Jan 2023 17:55)  Source: Clean Catch Clean Catch (Midstream)  Final Report (18 Jan 2023 22:28):    No growth    Culture - Blood (collected 17 Jan 2023 13:59)  Source: .Blood Blood-Peripheral  Preliminary Report (18 Jan 2023 18:01):    No growth to date.    Culture - Blood (collected 17 Jan 2023 13:59)  Source: .Blood Blood-Peripheral  Preliminary Report (18 Jan 2023 18:01):    No growth to date.      RADIOLOGY & ADDITIONAL STUDIES:    ACC: [id number] EXAM:  CT CHEST   ORDERED BY: HARLEY LLANES     PROCEDURE DATE:  01/19/2023          INTERPRETATION:  CLINICAL INFORMATION: Fever.    COMPARISON: CT chest from 6/12/2021.    CONTRAST/COMPLICATIONS:  IV Contrast: NONE  Oral Contrast: NONE  Complications: None reported at time of study completion    PROCEDURE:  CT scan of the chest was obtained without intravenous contrast.    FINDINGS:    LYMPH NODES: No lymphadenopathy.    HEART/VASCULATURE: Heart size is within normal limits. No pericardial   effusion. Atherosclerotic calcifications of the coronary arteries and   thoracic aorta.    AIRWAYS/LUNGS/PLEURA: The central airways are patent. Bronchial wall   thickening within the lower lobes. Bibasilar subsegmental atelectasis. No   focal consolidations. 3 mm calcified granuloma in the right upper lobe   (3:65). Right middle lobe 6 mm nodule with a pleural attachment (3:89),   stable. Right lower lobe 5 mm nodule (3:92), stable.    UPPER ABDOMEN: Stable left renal pelviectasis. Nonobstructing 2 mm stone   in the right kidney lower pole. Diverticulosis.    BONES/SOFT TISSUES: Chronic healed left fourth through sixth rib   fractures. Mild degenerative changes of the thoracic spine.    IMPRESSION:    Bibasilar subsegmental atelectasis. No consolidation.      ACC: [id number] EXAM:  XR KNEE AP LAT OBL 3 VIEWS RT                        ACC: [id number] EXAM:  XR CHEST PORTABLE IMMED 1V                          PROCEDURE DATE:  01/17/2023          INTERPRETATION:  AP chest on January 17, 2020 3:02 PM. Patient has sepsis.    Heart suggest normal size.    On June 12, 2021 there was slight left lower lung infiltrates and there   is only minimal residual linear atelectasis at this time.    Right knee. 3 views. Patient has local pain.    Small effusion is suspected.    There are calcified menisci. No fracture.    IMPRESSION: No acute chest finding. Small right knee effusion and mild   degeneration.      ACC: [id number] EXAM:  CT RENAL STONE HUNT                          PROCEDURE DATE:  01/17/2023          INTERPRETATION:  CLINICAL INFORMATION: fever SCT    COMPARISON: 6.12.21.    CONTRAST/COMPLICATIONS:  IV Contrast: None  Oral Contrast: None  Complications: None    PROCEDURE:  CT of the Abdomen and Pelvis was performed.  Sagittal and coronal reformats were performed.    FINDINGS:    LOWER CHEST: A 3 mm right lower lobe nodule is unchanged.    LIVER: Within normal limits.  BILE DUCTS: Normal caliber.  GALLBLADDER: Within normal limits.  SPLEEN: Within normal limits.  PANCREAS: Within normal limits.  ADRENALS: Within normal limits.  KIDNEYS/URETERS: Chronic left UPJ obstruction with moderate   hydronephrosis is unchanged. No renal calculus.    BLADDER: Small diverticulum.  REPRODUCTIVE ORGANS: The prostate is enlarged.    BOWEL: No bowel obstruction.  PERITONEUM: No ascites.  VESSELS:  Within normal limits.  RETROPERITONEUM/LYMPH NODES: A 3.7 x 1.9 cm retroperitoneal cystic lesion   on series 3 image 81 previously 2.7 x 1.2 cm.  ABDOMINAL WALL: Within normal limits.  BONES: Within normal limits.    IMPRESSION: Chronic left UPJ obstruction with moderate hydronephrosis is   unchanged. No renal calculus.    A retroperitoneal cystic lesion is mildly increased in size; consider   follow-up MRI.      Assessment :   81 y/o M  PMH HTN, a fib, HLD, SVT, BPH CVA psoriasis resident of [facility]  AMS, HTN admitted with weakness acute febrile illness. wbc 17K febrile to 101. UA neg CT AP neg except for chronic  L UPJ obstruction with chronic hydro. CT chest neg for pna.  c/o joint pains/ Right > Left.  Thus far work up unrevealing  ?viral   ?inflammatory process  Cultures NGTD  WBC better  Fevers resolved    Plan :   Cont Rocephin  Check ESR CRP TOMI  Trend temps and cbc  Pulm toileting  Asp precautions  Increase activity  PT   Stable from ID standpoint    Continue with present regiment.  Appropriate use of antibiotics and adverse effects reviewed.    > 35 minutes were spent in direct patient care reviewing notes, medications ,labs data/ imaging , discussion with multidisciplinary team.    Thank you for allowing me to participate in care of your patient .    Edel Llanes MD  Infectious Disease  [phone]
Chief Complaint: AMS, HTN, fever    Interval Events: No events overnight.    Review of Systems:  General: No fevers, chills, weight gain  Skin: No rashes, color changes  Cardiovascular: No chest pain, orthopnea  Respiratory: No shortness of breath, cough  Gastrointestinal: No nausea, abdominal pain  Genitourinary: No incontinence, pain with urination  Musculoskeletal: No pain, swelling, decreased range of motion  Neurological: No headache, weakness  Psychiatric: No depression, anxiety  Endocrine: No weight gain, increased thirst  All other systems are comprehensively negative.    Physical Exam:  Vital Signs Last 24 Hrs  T(C): 36.6 (23 Jan 2023 05:00), Max: 36.7 (23 Jan 2023 01:25)  T(F): 97.9 (23 Jan 2023 05:00), Max: 98 (23 Jan 2023 01:25)  HR: 61 (23 Jan 2023 05:00) (61 - 70)  BP: 167/85 (23 Jan 2023 05:00) (155/77 - 172/85)  BP(mean): --  RR: 18 (23 Jan 2023 05:00) (18 - 18)  SpO2: 94% (23 Jan 2023 05:00) (93% - 94%)  Parameters below as of 23 Jan 2023 05:00  Patient On (Oxygen Delivery Method): room air  General: NAD  HEENT: MMM  Neck: No JVD, no carotid bruit  Lungs: CTAB  CV: RRR, nl S1/S2, no M/R/G  Abdomen: S/NT/ND, +BS  Extremities: No LE edema, no cyanosis  Neuro: AAOx3, non-focal  Skin: No rash    Labs:    01-22    140  |  105  |  32<H>  ----------------------------<  91  3.8   |  26  |  0.87    Ca    9.2      22 Jan 2023 06:30  Phos  3.2     01-22  Mg     1.9     01-22                          14.4   7.65  )-----------( 294      ( 22 Jan 2023 06:30 )             43.6     ECG/Telemetry: Sinus rhythm
HPI  pt is a 83yo M admitted to [facility] [place] for fever and elevated BP   Pt was seen and examined at bedside. Pt states whenever he is moving, he is aching. BP is better controlled. Tmax 99.5 noted. Denies of any cough, burning on urination.     Vital Signs Last 24 Hrs  T(C): 36.5 (19 Jan 2023 05:04), Max: 37.5 (18 Jan 2023 17:36)  T(F): 97.7 (19 Jan 2023 05:04), Max: 99.5 (18 Jan 2023 17:36)  HR: 68 (19 Jan 2023 05:04) (60 - 81)  BP: 146/87 (19 Jan 2023 05:04) (118/78 - 156/69)  BP(mean): --  RR: 18 (19 Jan 2023 05:04) (16 - 19)  SpO2: 96% (19 Jan 2023 05:04) (90% - 97%)    Parameters below as of 19 Jan 2023 05:04  Patient On (Oxygen Delivery Method): nasal cannula  O2 Flow (L/min): 2      I&O's Summary      CAPILLARY BLOOD GLUCOSE          PHYSICAL EXAM:    Constitutional: NAD, appear weak   HEENT: PERR, EOMI,    Neck: Soft and supple, No LAD, No JVD  Respiratory: Breath sounds are clear bilaterally, No wheezing, rales or rhonchi on 1L NC   Cardiovascular: S1 and S2,   Gastrointestinal: Bowel Sounds present, soft, nontender,    Extremities: No peripheral edema  Vascular: 2+ peripheral pulses  Neurological: A/O x 3,     MEDICATIONS:  MEDICATIONS  (STANDING):  aspirin enteric coated 81 milliGRAM(s) Oral daily  atorvastatin 20 milliGRAM(s) Oral at bedtime  budesonide 160 MICROgram(s)/formoterol 4.5 MICROgram(s) Inhaler 2 Puff(s) Inhalation two times a day  cefTRIAXone   IVPB 1000 milliGRAM(s) IV Intermittent every 24 hours  cholecalciferol 1000 Unit(s) Oral daily  enoxaparin Injectable 40 milliGRAM(s) SubCutaneous every 24 hours  flecainide 100 milliGRAM(s) Oral every 12 hours  metoprolol tartrate 25 milliGRAM(s) Oral every 12 hours  mirtazapine 7.5 milliGRAM(s) Oral daily  modafinil 100 milliGRAM(s) Oral daily  pantoprazole    Tablet 40 milliGRAM(s) Oral before breakfast  potassium chloride    Tablet ER 20 milliEquivalent(s) Oral once  tamsulosin 0.4 milliGRAM(s) Oral at bedtime      LABS: All Labs Reviewed:                        13.4   12.79 )-----------( 243      ( 19 Jan 2023 08:17 )             41.3     01-19    143  |  107  |  33<H>  ----------------------------<  106<H>  3.4<L>   |  30  |  1.33<H>    Ca    9.2      19 Jan 2023 08:17  Mg     2.2     01-18    TPro  6.9  /  Alb  2.6<L>  /  TBili  0.8  /  DBili  x   /  AST  16  /  ALT  <10<L>  /  AlkPhos  60  01-18    PT/INR - ( 17 Jan 2023 13:59 )   PT: 18.8 sec;   INR: 1.59 ratio         PTT - ( 17 Jan 2023 13:59 )  PTT:32.0 sec      Blood Culture: 01-17 @ 17:55  Organism --  Gram Stain Blood -- Gram Stain --  Specimen Source Clean Catch Clean Catch (Midstream)  Culture-Blood --    01-17 @ 13:59  Organism --  Gram Stain Blood -- Gram Stain --  Specimen Source .Blood Blood-Peripheral  Culture-Blood --        RADIOLOGY/EKG:    DVT PPX:    ADVANCED DIRECTIVE:    DISPOSITION:
PROGRESS NOTE:   Authoted by Dr. Anna Phillips MD, Available on MS Teams    Patient is a 82y old  Male who presents with a chief complaint of AMS, fever, high BP (20 Jan 2023 14:22)      SUBJECTIVE / OVERNIGHT EVENTS: Patient overall feels okay except for RLE pain. No fevers or chills, chest pain or shortness of breath.     ADDITIONAL REVIEW OF SYSTEMS:    MEDICATIONS  (STANDING):  aspirin enteric coated 81 milliGRAM(s) Oral daily  atorvastatin 20 milliGRAM(s) Oral at bedtime  budesonide 160 MICROgram(s)/formoterol 4.5 MICROgram(s) Inhaler 2 Puff(s) Inhalation two times a day  cholecalciferol 1000 Unit(s) Oral daily  enoxaparin Injectable 40 milliGRAM(s) SubCutaneous every 24 hours  flecainide 100 milliGRAM(s) Oral every 12 hours  metoprolol tartrate 25 milliGRAM(s) Oral every 12 hours  mirtazapine 7.5 milliGRAM(s) Oral daily  modafinil 100 milliGRAM(s) Oral daily  pantoprazole    Tablet 40 milliGRAM(s) Oral before breakfast  polyethylene glycol 3350 17 Gram(s) Oral two times a day  senna 2 Tablet(s) Oral at bedtime  tamsulosin 0.4 milliGRAM(s) Oral at bedtime    MEDICATIONS  (PRN):  acetaminophen     Tablet .. 650 milliGRAM(s) Oral every 6 hours PRN Temp greater or equal to 38C (100.4F), Mild Pain (1 - 3)  ALPRAZolam 0.25 milliGRAM(s) Oral every 12 hours PRN anxiety  aluminum hydroxide/magnesium hydroxide/simethicone Suspension 30 milliLiter(s) Oral every 4 hours PRN Dyspepsia  hydrALAZINE Injectable 10 milliGRAM(s) IV Push every 6 hours PRN SBP > 180  ibuprofen  Tablet. 400 milliGRAM(s) Oral every 6 hours PRN Moderate Pain (4 - 6)  melatonin 3 milliGRAM(s) Oral at bedtime PRN Insomnia      CAPILLARY BLOOD GLUCOSE        I&O's Summary    20 Jan 2023 07:01  -  21 Jan 2023 07:00  --------------------------------------------------------  IN: 0 mL / OUT: 125 mL / NET: -125 mL        PHYSICAL EXAM:  Vital Signs Last 24 Hrs  T(C): 36.8 (21 Jan 2023 09:30), Max: 36.9 (20 Jan 2023 14:43)  T(F): 98.3 (21 Jan 2023 09:30), Max: 98.5 (20 Jan 2023 14:43)  HR: 63 (21 Jan 2023 09:30) (58 - 92)  BP: 122/70 (21 Jan 2023 09:30) (122/70 - 166/90)  BP(mean): --  RR: 19 (21 Jan 2023 09:30) (18 - 19)  SpO2: 94% (21 Jan 2023 09:30) (94% - 96%)    Parameters below as of 21 Jan 2023 05:39  Patient On (Oxygen Delivery Method): room air        CONSTITUTIONAL: NAD, well-developed  RESPIRATORY: Normal respiratory effort; lungs are clear to auscultation bilaterally  CARDIOVASCULAR: Regular rate and rhythm, normal S1 and S2, no murmur/rub/gallop; No lower extremity edema  ABDOMEN: Nontender to palpation, normoactive bowel sounds, no rebound/guarding  MUSCLOSKELETAL: no clubbing or cyanosis of digits; RLE ROM limited due to pain  PSYCH: A+O to person, place, and time; affect appropriate    LABS:                        13.9   8.68  )-----------( 263      ( 21 Jan 2023 08:51 )             42.7     01-21    144  |  105  |  38<H>  ----------------------------<  157<H>  4.0   |  29  |  0.93    Ca    9.2      21 Jan 2023 08:51  Phos  3.5     01-21  Mg     1.8     01-21  
PROGRESS NOTE:   Authoted by Dr. Anna Phillips MD, Available on MS Teams    Patient is a 82y old  Male who presents with a chief complaint of AMS, fever, high BP (22 Jan 2023 11:19)      SUBJECTIVE / OVERNIGHT EVENTS: Patient has some R knee pain. No chest pain or shortness of breath. No nausea, vomiting, abdominal pain.     ADDITIONAL REVIEW OF SYSTEMS:    MEDICATIONS  (STANDING):  amLODIPine   Tablet 2.5 milliGRAM(s) Oral daily  aspirin enteric coated 81 milliGRAM(s) Oral daily  atorvastatin 20 milliGRAM(s) Oral at bedtime  budesonide 160 MICROgram(s)/formoterol 4.5 MICROgram(s) Inhaler 2 Puff(s) Inhalation two times a day  cholecalciferol 1000 Unit(s) Oral daily  clobetasol 0.05% Cream 1 Application(s) Topical two times a day  enoxaparin Injectable 40 milliGRAM(s) SubCutaneous every 24 hours  flecainide 100 milliGRAM(s) Oral every 12 hours  metoprolol tartrate 25 milliGRAM(s) Oral every 12 hours  mirtazapine 7.5 milliGRAM(s) Oral daily  modafinil 100 milliGRAM(s) Oral daily  pantoprazole    Tablet 40 milliGRAM(s) Oral before breakfast  polyethylene glycol 3350 17 Gram(s) Oral two times a day  senna 2 Tablet(s) Oral at bedtime  tamsulosin 0.4 milliGRAM(s) Oral at bedtime    MEDICATIONS  (PRN):  acetaminophen     Tablet .. 650 milliGRAM(s) Oral every 6 hours PRN Temp greater or equal to 38C (100.4F), Mild Pain (1 - 3)  ALPRAZolam 0.25 milliGRAM(s) Oral every 12 hours PRN anxiety  aluminum hydroxide/magnesium hydroxide/simethicone Suspension 30 milliLiter(s) Oral every 4 hours PRN Dyspepsia  ibuprofen  Tablet. 400 milliGRAM(s) Oral every 6 hours PRN Moderate Pain (4 - 6)  melatonin 3 milliGRAM(s) Oral at bedtime PRN Insomnia      CAPILLARY BLOOD GLUCOSE        I&O's Summary    22 Jan 2023 07:01  -  23 Jan 2023 07:00  --------------------------------------------------------  IN: 110 mL / OUT: 300 mL / NET: -190 mL        PHYSICAL EXAM:  Vital Signs Last 24 Hrs  T(C): 36.6 (23 Jan 2023 05:00), Max: 36.7 (23 Jan 2023 01:25)  T(F): 97.9 (23 Jan 2023 05:00), Max: 98 (23 Jan 2023 01:25)  HR: 61 (23 Jan 2023 05:00) (61 - 70)  BP: 167/85 (23 Jan 2023 05:00) (155/77 - 172/85)  BP(mean): --  RR: 18 (23 Jan 2023 05:00) (18 - 18)  SpO2: 94% (23 Jan 2023 05:00) (93% - 94%)    Parameters below as of 23 Jan 2023 05:00  Patient On (Oxygen Delivery Method): room air        CONSTITUTIONAL: NAD, well-developed  RESPIRATORY: Normal respiratory effort; lungs are clear to auscultation bilaterally  CARDIOVASCULAR: Regular rate and rhythm, normal S1 and S2, no murmur/rub/gallop; No lower extremity edema  ABDOMEN: Nontender to palpation, normoactive bowel sounds, no rebound/guarding  MUSCLOSKELETAL: no clubbing or cyanosis of digits; R knee pain with ROM  PSYCH: A+O to person, place, and time; affect appropriate    LABS:                        14.4   7.65  )-----------( 294      ( 22 Jan 2023 06:30 )             43.6     01-22    140  |  105  |  32<H>  ----------------------------<  91  3.8   |  26  |  0.87    Ca    9.2      22 Jan 2023 06:30  Phos  3.2     01-22  Mg     1.9     01-22        
PROGRESS NOTE:   Authoted by Dr. Anna Phlilips MD, Available on MS Teams    Patient is a 82y old  Male who presents with a chief complaint of AMS, fever, high BP (25 Jan 2023 13:03)    SUBJECTIVE / OVERNIGHT EVENTS: Patient feels fine. Pain is improving.     ADDITIONAL REVIEW OF SYSTEMS:    MEDICATIONS  (STANDING):  aspirin enteric coated 81 milliGRAM(s) Oral daily  atorvastatin 20 milliGRAM(s) Oral at bedtime  budesonide 160 MICROgram(s)/formoterol 4.5 MICROgram(s) Inhaler 2 Puff(s) Inhalation two times a day  cholecalciferol 1000 Unit(s) Oral daily  clobetasol 0.05% Cream 1 Application(s) Topical two times a day  enoxaparin Injectable 40 milliGRAM(s) SubCutaneous every 24 hours  flecainide 100 milliGRAM(s) Oral every 12 hours  hydrochlorothiazide 25 milliGRAM(s) Oral daily  metoprolol tartrate 25 milliGRAM(s) Oral every 12 hours  mirtazapine 7.5 milliGRAM(s) Oral daily  modafinil 100 milliGRAM(s) Oral daily  pantoprazole    Tablet 40 milliGRAM(s) Oral before breakfast  polyethylene glycol 3350 17 Gram(s) Oral two times a day  senna 2 Tablet(s) Oral at bedtime  tamsulosin 0.4 milliGRAM(s) Oral at bedtime    MEDICATIONS  (PRN):  acetaminophen     Tablet .. 650 milliGRAM(s) Oral every 6 hours PRN Temp greater or equal to 38C (100.4F), Mild Pain (1 - 3)  ALPRAZolam 0.25 milliGRAM(s) Oral every 12 hours PRN anxiety  ibuprofen  Tablet. 400 milliGRAM(s) Oral every 6 hours PRN Moderate Pain (4 - 6)  melatonin 3 milliGRAM(s) Oral at bedtime PRN Insomnia      CAPILLARY BLOOD GLUCOSE        I&O's Summary    25 Jan 2023 07:01  -  26 Jan 2023 07:00  --------------------------------------------------------  IN: 300 mL / OUT: 0 mL / NET: 300 mL        PHYSICAL EXAM:  Vital Signs Last 24 Hrs  T(C): 36.7 (26 Jan 2023 05:18), Max: 36.7 (25 Jan 2023 12:12)  T(F): 98 (26 Jan 2023 05:18), Max: 98 (25 Jan 2023 12:12)  HR: 61 (26 Jan 2023 05:18) (61 - 66)  BP: 127/72 (26 Jan 2023 05:18) (107/66 - 147/82)  BP(mean): --  RR: 18 (26 Jan 2023 05:18) (18 - 18)  SpO2: 93% (26 Jan 2023 05:18) (93% - 94%)    Parameters below as of 26 Jan 2023 05:18  Patient On (Oxygen Delivery Method): room air        CONSTITUTIONAL: NAD, well-developed  RESPIRATORY: Normal respiratory effort; lungs are clear to auscultation bilaterally  CARDIOVASCULAR: Regular rate and rhythm, normal S1 and S2, no murmur/rub/gallop; No lower extremity edema  ABDOMEN: Nontender to palpation, normoactive bowel sounds, no rebound/guarding  MUSCLOSKELETAL: no clubbing or cyanosis of digits; no joint swelling or tenderness to palpation  PSYCH: A+O to person, place, and time; affect appropriate
Chief Complaint: AMS, HTN, fever    Interval Events: No events overnight.    Review of Systems:  General: No fevers, chills, weight gain  Skin: No rashes, color changes  Cardiovascular: No chest pain, orthopnea  Respiratory: No shortness of breath, cough  Gastrointestinal: No nausea, abdominal pain  Genitourinary: No incontinence, pain with urination  Musculoskeletal: No pain, swelling, decreased range of motion  Neurological: No headache, weakness  Psychiatric: No depression, anxiety  Endocrine: No weight gain, increased thirst  All other systems are comprehensively negative.    Physical Exam:  Vital Signs Last 24 Hrs  T(C): 36.7 (25 Jan 2023 05:16), Max: 36.9 (24 Jan 2023 11:26)  T(F): 98 (25 Jan 2023 05:16), Max: 98.4 (24 Jan 2023 11:26)  HR: 60 (25 Jan 2023 05:16) (60 - 76)  BP: 134/72 (25 Jan 2023 05:16) (132/78 - 152/78)  BP(mean): --  RR: 18 (25 Jan 2023 05:16) (18 - 20)  SpO2: 94% (25 Jan 2023 05:16) (93% - 96%)  Parameters below as of 25 Jan 2023 05:16  Patient On (Oxygen Delivery Method): room air  General: NAD  HEENT: MMM  Neck: No JVD, no carotid bruit  Lungs: CTAB  CV: RRR, nl S1/S2, no M/R/G  Abdomen: S/NT/ND, +BS  Extremities: No LE edema, no cyanosis  Neuro: AAOx3, non-focal  Skin: No rash    Labs:        ECG/Telemetry: Sinus rhythm
PROGRESS NOTE:   Authoted by Dr. Anna Phillips MD, Available on MS Teams    Patient is a 82y old  Male who presents with a chief complaint of AMS, fever, high BP (20 Jan 2023 10:16)      SUBJECTIVE / OVERNIGHT EVENTS: Patient feels much better today. Joint pains are improving. No fevers or chills, chest pain or shortness of breath. No nausea, vomiting, abdominal pain.     ADDITIONAL REVIEW OF SYSTEMS:    MEDICATIONS  (STANDING):  aspirin enteric coated 81 milliGRAM(s) Oral daily  atorvastatin 20 milliGRAM(s) Oral at bedtime  budesonide 160 MICROgram(s)/formoterol 4.5 MICROgram(s) Inhaler 2 Puff(s) Inhalation two times a day  cholecalciferol 1000 Unit(s) Oral daily  enoxaparin Injectable 40 milliGRAM(s) SubCutaneous every 24 hours  flecainide 100 milliGRAM(s) Oral every 12 hours  metoprolol tartrate 25 milliGRAM(s) Oral every 12 hours  mirtazapine 7.5 milliGRAM(s) Oral daily  modafinil 100 milliGRAM(s) Oral daily  pantoprazole    Tablet 40 milliGRAM(s) Oral before breakfast  tamsulosin 0.4 milliGRAM(s) Oral at bedtime    MEDICATIONS  (PRN):  acetaminophen     Tablet .. 650 milliGRAM(s) Oral every 6 hours PRN Temp greater or equal to 38C (100.4F), Mild Pain (1 - 3)  ALPRAZolam 0.25 milliGRAM(s) Oral every 12 hours PRN anxiety  aluminum hydroxide/magnesium hydroxide/simethicone Suspension 30 milliLiter(s) Oral every 4 hours PRN Dyspepsia  hydrALAZINE Injectable 10 milliGRAM(s) IV Push every 6 hours PRN SBP > 180  melatonin 3 milliGRAM(s) Oral at bedtime PRN Insomnia  oxyCODONE    IR 2.5 milliGRAM(s) Oral every 6 hours PRN Moderate Pain (4 - 6)  oxyCODONE    IR 5 milliGRAM(s) Oral every 6 hours PRN Severe Pain (7 - 10)      CAPILLARY BLOOD GLUCOSE        I&O's Summary      PHYSICAL EXAM:  Vital Signs Last 24 Hrs  T(C): 36.8 (20 Jan 2023 10:30), Max: 37.3 (19 Jan 2023 12:00)  T(F): 98.2 (20 Jan 2023 10:30), Max: 99.1 (19 Jan 2023 12:00)  HR: 71 (20 Jan 2023 10:30) (63 - 71)  BP: 132/72 (20 Jan 2023 10:30) (114/68 - 165/71)  BP(mean): --  RR: 18 (20 Jan 2023 10:30) (17 - 20)  SpO2: 93% (20 Jan 2023 10:30) (91% - 98%)    Parameters below as of 20 Jan 2023 10:30  Patient On (Oxygen Delivery Method): room air        CONSTITUTIONAL: NAD, well-developed  RESPIRATORY: Normal respiratory effort; lungs are clear to auscultation bilaterally  CARDIOVASCULAR: Regular rate and rhythm, normal S1 and S2, no murmur/rub/gallop; No lower extremity edema  ABDOMEN: Nontender to palpation, normoactive bowel sounds, no rebound/guarding  MUSCLOSKELETAL: Pain with ROM of RLE  PSYCH: A+O to person, place, and time; affect appropriate  NEURO: sensation in tact in all extremities    LABS:                        13.0   10.38 )-----------( 271      ( 20 Jan 2023 06:30 )             39.9     01-20    143  |  107  |  31<H>  ----------------------------<  100<H>  3.7   |  27  |  0.92    Ca    9.3      20 Jan 2023 06:30                Culture - Urine (collected 17 Jan 2023 17:55)  Source: Clean Catch Clean Catch (Midstream)  Final Report (18 Jan 2023 22:28):    No growth    Culture - Blood (collected 17 Jan 2023 13:59)  Source: .Blood Blood-Peripheral  Preliminary Report (18 Jan 2023 18:01):    No growth to date.    Culture - Blood (collected 17 Jan 2023 13:59)  Source: .Blood Blood-Peripheral  Preliminary Report (18 Jan 2023 18:01):    No growth to date.        RADIOLOGY & ADDITIONAL TESTS:  Results Reviewed:   Imaging Personally Reviewed:  Electrocardiogram Personally Reviewed:    COORDINATION OF CARE:  Care Discussed with Consultants/Other Providers [Y/N]: Discussed with ID, no signs of infection, can d/c abx  Prior or Outpatient Records Reviewed [Y/N]:  
Patient is a 82y old  Male who presents with a chief complaint of AMS, fever, high BP (18 Jan 2023 07:54)    HPI: This is an 81 y/o M with PMH HTN, a fib, HLD, SVT, BPH CVA presents to ED from the [facility] for AMS, HTN and fever. Per EMS suspected UTI as her has been urinating more frequently. Pt states he feels "off". Denies HA, chest pain, SOB, nausea vomiting. States "they told me my BP was high". As per RN pt c.o R knee pain. Pt states he fell a few days ago. Found to be febrile 101 rectally in ED. Daughter reports that patient's sleep-wake cycle has been off the past couple of weeks -- he's been sleeping more during the day and is up at night. She reports that he's missed several meals in the [facility] dining room. He usually walks independently with a walker. She saw him on 1/14/23 and he seemed to be in his usual state of health. Currently in ER patient is back to his baseline -- he is A & O x 4. Daughter reports that patient was recently taken off his BP meds because his BP was running low. He has a known h/o L UPJ obstruction -- he had seen a urologist and told that no further intervention was needed at that time.    (17 Jan 2023 22:12)  No facial asymmetry  No lateralized weakness.  No LOC.  No shaking or seizures.    Interval History -     No distress.  Follows commands.    MEDICATIONS  (STANDING):    aspirin enteric coated 81 milliGRAM(s) Oral daily  atorvastatin 20 milliGRAM(s) Oral at bedtime  budesonide 160 MICROgram(s)/formoterol 4.5 MICROgram(s) Inhaler 2 Puff(s) Inhalation two times a day  cholecalciferol 1000 Unit(s) Oral daily  enoxaparin Injectable 40 milliGRAM(s) SubCutaneous every 24 hours  flecainide 100 milliGRAM(s) Oral every 12 hours  metoprolol tartrate 25 milliGRAM(s) Oral every 12 hours  mirtazapine 7.5 milliGRAM(s) Oral daily  modafinil 100 milliGRAM(s) Oral daily  pantoprazole    Tablet 40 milliGRAM(s) Oral before breakfast  polyethylene glycol 3350 17 Gram(s) Oral two times a day  senna 2 Tablet(s) Oral at bedtime  tamsulosin 0.4 milliGRAM(s) Oral at bedtime    MEDICATIONS  (PRN):    acetaminophen     Tablet .. 650 milliGRAM(s) Oral every 6 hours PRN Temp greater or equal to 38C (100.4F), Mild Pain (1 - 3)  ALPRAZolam 0.25 milliGRAM(s) Oral every 12 hours PRN anxiety  aluminum hydroxide/magnesium hydroxide/simethicone Suspension 30 milliLiter(s) Oral every 4 hours PRN Dyspepsia  hydrALAZINE Injectable 10 milliGRAM(s) IV Push every 6 hours PRN SBP > 180  ibuprofen  Tablet. 400 milliGRAM(s) Oral every 6 hours PRN Moderate Pain (4 - 6)  melatonin 3 milliGRAM(s) Oral at bedtime PRN Insomnia    Allergies    No Known Allergies    REVIEW OF SYSTEMS:    CONSTITUTIONAL: No fever  EYES: No eye pain,   ENMT:  No sinus or throat pain  NECK: No pain or stiffness  RESPIRATORY: No cough, No hemoptysis; No shortness of breath  CARDIOVASCULAR: No acute chest pain, palpitations,  or leg swelling  GASTROINTESTINAL: No abdominal pain. No nausea, vomiting,   GENITOURINARY: No  hematuria, or incontinence  MUSCULOSKELETAL: No joint swelling; No extremity pain  SKIN: No itching, rashes, or lesions   LYMPH NODES: No enlarged glands  NEUROLOGICAL: No headaches, memory loss,   PSYCHIATRIC: No depression, anxiety, mood swings, or difficulty sleeping  ENDOCRINE: No heat or cold intolerance;   HEME/LYMPH: No easy bruising, or bleeding gums  Allergy/Immunology. No medication allergy. No seasonal allergies.    PHYSICAL EXAM:    Vital Signs Last 24 Hrs  T(C): 36.7 (21 Jan 2023 11:53), Max: 36.9 (20 Jan 2023 14:43)  T(F): 98 (21 Jan 2023 11:53), Max: 98.5 (20 Jan 2023 14:43)  HR: 64 (21 Jan 2023 11:53) (58 - 92)  BP: 134/74 (21 Jan 2023 11:53) (122/70 - 166/90)  BP(mean): --  RR: 19 (21 Jan 2023 09:30) (18 - 19)  SpO2: 95% (21 Jan 2023 11:53) (94% - 96%)    Parameters below as of 21 Jan 2023 11:53  Patient On (Oxygen Delivery Method): room air    GENERAL: NAD, well-groomed, well-developed  HEAD:  Atraumatic, Normocephalic  EYES: EOMI, PERRLA, conjunctiva and sclera clear  NECK: Supple, No JVD, thyroid non-palpable    On Neurological Examination:    Mental Status - Pt is alert, awake, oriented X3. Higher functions are intact. Follows commands well.    Speech -  Normal. Pt has no aphasia.    Cranial Nerves - Pupils 3 mm equal and reactive to light, extraocular eye movements intact. No facial asymmetry. Tongue - is in midline.    Motor Exam - 4/5 all over, No drift. No shaking or tremors.    Sensory Exam -  Pt withdraws all extremities equally on stimulation.     Gait - Will get it tested with PT/RW to prevent fall.    Deep tendon Reflexes - 2 plus all over.    Coordination - No asymmetry      Neck Supple -  Yes.    LABS:             CBC Full  -  ( 21 Jan 2023 08:51 )  WBC Count : 8.68 K/uL  RBC Count : 4.75 M/uL  Hemoglobin : 13.9 g/dL  Hematocrit : 42.7 %  Platelet Count - Automated : 263 K/uL  Mean Cell Volume : 89.9 fl  Mean Cell Hemoglobin : 29.3 pg  Mean Cell Hemoglobin Concentration : 32.6 gm/dL    01-21    144  |  105  |  38<H>  ----------------------------<  157<H>  4.0   |  29  |  0.93    Ca    9.2      21 Jan 2023 08:51  Phos  3.5     01-21  Mg     1.8     01-21      RADIOLOGY & ADDITIONAL STUDIES:    < from: CT Head No Cont (01.17.23 @ 14:44) >    No hydrocephalus, acute intracranial hemorrhage, mass effect, or brain edema.    Small air-fluid level in left sphenoid sinus, correlate for the presence of acute sinusitis.    < end of copied text >    < from: CT Renal Stone Hunt (01.17.23 @ 15:44) >    IMPRESSION: Chronic left UPJ obstruction with moderate hydronephrosis is unchanged. No renal calculus.    < end of copied text >    < from: US Transthoracic Echocardiogram w/Doppler Complete (01.18.23 @ 09:07) >    IMPRESSION:    1. Mild left ventricular hypertrophy with normal left ventricular systolic function with stage I diastolic dysfunction  2. Aortic sclerosis, mild aortic regurgitation    < end of copied text >    
     DONGSAMANTA MENDOSA is a yMale , patient examined and chart reviewed.     INTERVAL HPI/ OVERNIGHT EVENTS:   Feeling better. No events.  Afebrile.    PAST MEDICAL & SURGICAL HISTORY:  Hypertension  Atrial fibrillation  Asthma  No significant past surgical history        For details regarding the patient's social history, family history, and other miscellaneous elements, please refer the initial infectious diseases consultation and/or the admitting history and physical examination for this admission.      ROS:  CONSTITUTIONAL:  Negative fever or chills + weakness  EYES:  Negative  blurry vision or double vision  CARDIOVASCULAR:  Negative for chest pain or palpitations  RESPIRATORY:  Negative for cough, wheezing, or SOB   GASTROINTESTINAL:  Negative for nausea, vomiting, diarrhea, constipation, or abdominal pain  GENITOURINARY:  Negative frequency, urgency or dysuria  NEUROLOGIC:  No headache, confusion, dizziness, lightheadedness  All other systems were reviewed and are negative         Current inpatient medications :    ANTIBIOTICS/RELEVANT:    MEDICATIONS  (STANDING):  aspirin enteric coated 81 milliGRAM(s) Oral daily  atorvastatin 20 milliGRAM(s) Oral at bedtime  budesonide 160 MICROgram(s)/formoterol 4.5 MICROgram(s) Inhaler 2 Puff(s) Inhalation two times a day  cholecalciferol 1000 Unit(s) Oral daily  clobetasol 0.05% Cream 1 Application(s) Topical two times a day  enoxaparin Injectable 40 milliGRAM(s) SubCutaneous every 24 hours  flecainide 100 milliGRAM(s) Oral every 12 hours  hydrochlorothiazide 25 milliGRAM(s) Oral daily  metoprolol tartrate 25 milliGRAM(s) Oral every 12 hours  mirtazapine 7.5 milliGRAM(s) Oral daily  modafinil 100 milliGRAM(s) Oral daily  pantoprazole    Tablet 40 milliGRAM(s) Oral before breakfast  polyethylene glycol 3350 17 Gram(s) Oral two times a day  senna 2 Tablet(s) Oral at bedtime  tamsulosin 0.4 milliGRAM(s) Oral at bedtime    MEDICATIONS  (PRN):  acetaminophen     Tablet .. 650 milliGRAM(s) Oral every 6 hours PRN Temp greater or equal to 38C (100.4F), Mild Pain (1 - 3)  ibuprofen  Tablet. 400 milliGRAM(s) Oral every 6 hours PRN Moderate Pain (4 - 6)  melatonin 3 milliGRAM(s) Oral at bedtime PRN Insomnia        Objective:  Vital Signs Last 24 Hrs  T(C): 36.7 (23 Jan 2023 15:00), Max: 36.7 (23 Jan 2023 01:25)  T(F): 98 (23 Jan 2023 15:00), Max: 98.1 (23 Jan 2023 11:54)  HR: 62 (23 Jan 2023 15:00) (61 - 65)  BP: 142/76 (23 Jan 2023 15:00) (142/76 - 170/84)  RR: 18 (23 Jan 2023 15:00) (18 - 18)  SpO2: 95% (23 Jan 2023 15:00) (93% - 95%)    Parameters below as of 23 Jan 2023 15:00  Patient On (Oxygen Delivery Method): room air      Physical Exam:  General: weak no acute distress  Neck: supple, trachea midline  Lungs: clear, no wheeze/rhonchi  Cardiovascular: regular rate and rhythm, S1 S2  Abdomen: soft, nontender,  bowel sounds normal  Neurological: alert and oriented x3  Skin: no rash  Extremities: trace edema      LABS:                        14.4   7.65  )-----------( 294      ( 22 Jan 2023 06:30 )             43.6   01-22    140  |  105  |  32<H>  ----------------------------<  91  3.8   |  26  |  0.87    Ca    9.2      22 Jan 2023 06:30  Phos  3.2     01-22  Mg     1.9     01-22        MICROBIOLOGY:  Culture - Urine (collected 17 Jan 2023 17:55)  Source: Clean Catch Clean Catch (Midstream)  Final Report (18 Jan 2023 22:28):    No growth    Culture - Blood (collected 17 Jan 2023 13:59)  Source: .Blood Blood-Peripheral  Preliminary Report (18 Jan 2023 18:01):    No growth to date.    Culture - Blood (collected 17 Jan 2023 13:59)  Source: .Blood Blood-Peripheral  Preliminary Report (18 Jan 2023 18:01):    No growth to date.      RADIOLOGY & ADDITIONAL STUDIES:    ACC: [id number] EXAM:  CT CHEST   ORDERED BY: HARLEY LLANES     PROCEDURE DATE:  01/19/2023          INTERPRETATION:  CLINICAL INFORMATION: Fever.    COMPARISON: CT chest from 6/12/2021.    CONTRAST/COMPLICATIONS:  IV Contrast: NONE  Oral Contrast: NONE  Complications: None reported at time of study completion    PROCEDURE:  CT scan of the chest was obtained without intravenous contrast.    FINDINGS:    LYMPH NODES: No lymphadenopathy.    HEART/VASCULATURE: Heart size is within normal limits. No pericardial   effusion. Atherosclerotic calcifications of the coronary arteries and   thoracic aorta.    AIRWAYS/LUNGS/PLEURA: The central airways are patent. Bronchial wall   thickening within the lower lobes. Bibasilar subsegmental atelectasis. No   focal consolidations. 3 mm calcified granuloma in the right upper lobe   (3:65). Right middle lobe 6 mm nodule with a pleural attachment (3:89),   stable. Right lower lobe 5 mm nodule (3:92), stable.    UPPER ABDOMEN: Stable left renal pelviectasis. Nonobstructing 2 mm stone   in the right kidney lower pole. Diverticulosis.    BONES/SOFT TISSUES: Chronic healed left fourth through sixth rib   fractures. Mild degenerative changes of the thoracic spine.    IMPRESSION:    Bibasilar subsegmental atelectasis. No consolidation.      ACC: [id number] EXAM:  XR KNEE AP LAT OBL 3 VIEWS RT                        ACC: [id number] EXAM:  XR CHEST PORTABLE IMMED 1V                          PROCEDURE DATE:  01/17/2023          INTERPRETATION:  AP chest on January 17, 2020 3:02 PM. Patient has sepsis.    Heart suggest normal size.    On June 12, 2021 there was slight left lower lung infiltrates and there   is only minimal residual linear atelectasis at this time.    Right knee. 3 views. Patient has local pain.    Small effusion is suspected.    There are calcified menisci. No fracture.    IMPRESSION: No acute chest finding. Small right knee effusion and mild   degeneration.      ACC: [id number] EXAM:  CT RENAL STONE HUNT                          PROCEDURE DATE:  01/17/2023          INTERPRETATION:  CLINICAL INFORMATION: fever SCT    COMPARISON: 6.12.21.    CONTRAST/COMPLICATIONS:  IV Contrast: None  Oral Contrast: None  Complications: None    PROCEDURE:  CT of the Abdomen and Pelvis was performed.  Sagittal and coronal reformats were performed.    FINDINGS:    LOWER CHEST: A 3 mm right lower lobe nodule is unchanged.    LIVER: Within normal limits.  BILE DUCTS: Normal caliber.  GALLBLADDER: Within normal limits.  SPLEEN: Within normal limits.  PANCREAS: Within normal limits.  ADRENALS: Within normal limits.  KIDNEYS/URETERS: Chronic left UPJ obstruction with moderate   hydronephrosis is unchanged. No renal calculus.    BLADDER: Small diverticulum.  REPRODUCTIVE ORGANS: The prostate is enlarged.    BOWEL: No bowel obstruction.  PERITONEUM: No ascites.  VESSELS:  Within normal limits.  RETROPERITONEUM/LYMPH NODES: A 3.7 x 1.9 cm retroperitoneal cystic lesion   on series 3 image 81 previously 2.7 x 1.2 cm.  ABDOMINAL WALL: Within normal limits.  BONES: Within normal limits.    IMPRESSION: Chronic left UPJ obstruction with moderate hydronephrosis is   unchanged. No renal calculus.    A retroperitoneal cystic lesion is mildly increased in size; consider   follow-up MRI.      Assessment :   83 y/o M  PMH HTN, a fib, HLD, SVT, BPH CVA psoriasis resident of [facility]  AMS, HTN admitted with weakness acute febrile illness. wbc 17K febrile to 101. UA neg CT AP neg except for chronic  L UPJ obstruction with chronic hydro. CT chest neg for pna.  c/o joint pains/ Right > Left.  Thus far work up unrevealing  ?viral   ?inflammatory process  Cultures NGTD  WBC better  Fevers resolved    Plan :   Monitor off antibiotics  Trend temps and cbc  Pulm toileting  Asp precautions  Increase activity  PT   Stable from ID standpoint        Continue with present regiment.  Appropriate use of antibiotics and adverse effects reviewed.    > 35 minutes were spent in direct patient care reviewing notes, medications ,labs data/ imaging , discussion with multidisciplinary team.    Thank you for allowing me to participate in care of your patient .    Edel Llanes MD  Infectious Disease  [phone]
     DONGSAMANTA MENDOSA is a yMale , patient examined and chart reviewed.     INTERVAL HPI/ OVERNIGHT EVENTS:   Still with pain.  Afebrile.    PAST MEDICAL & SURGICAL HISTORY:  Hypertension  Atrial fibrillation  Asthma  No significant past surgical history        For details regarding the patient's social history, family history, and other miscellaneous elements, please refer the initial infectious diseases consultation and/or the admitting history and physical examination for this admission.      ROS:  CONSTITUTIONAL:  Negative fever or chills + weakness  EYES:  Negative  blurry vision or double vision  CARDIOVASCULAR:  Negative for chest pain or palpitations  RESPIRATORY:  Negative for cough, wheezing, or SOB   GASTROINTESTINAL:  Negative for nausea, vomiting, diarrhea, constipation, or abdominal pain  GENITOURINARY:  Negative frequency, urgency or dysuria  NEUROLOGIC:  No headache, confusion, dizziness, lightheadedness  All other systems were reviewed and are negative         Current inpatient medications :    ANTIBIOTICS/RELEVANT:    MEDICATIONS  (STANDING):  aspirin enteric coated 81 milliGRAM(s) Oral daily  atorvastatin 20 milliGRAM(s) Oral at bedtime  budesonide 160 MICROgram(s)/formoterol 4.5 MICROgram(s) Inhaler 2 Puff(s) Inhalation two times a day  cholecalciferol 1000 Unit(s) Oral daily  enoxaparin Injectable 40 milliGRAM(s) SubCutaneous every 24 hours  flecainide 100 milliGRAM(s) Oral every 12 hours  metoprolol tartrate 25 milliGRAM(s) Oral every 12 hours  mirtazapine 7.5 milliGRAM(s) Oral daily  modafinil 100 milliGRAM(s) Oral daily  pantoprazole    Tablet 40 milliGRAM(s) Oral before breakfast  polyethylene glycol 3350 17 Gram(s) Oral two times a day  senna 2 Tablet(s) Oral at bedtime  tamsulosin 0.4 milliGRAM(s) Oral at bedtime    MEDICATIONS  (PRN):  acetaminophen     Tablet .. 650 milliGRAM(s) Oral every 6 hours PRN Temp greater or equal to 38C (100.4F), Mild Pain (1 - 3)  ALPRAZolam 0.25 milliGRAM(s) Oral every 12 hours PRN anxiety  aluminum hydroxide/magnesium hydroxide/simethicone Suspension 30 milliLiter(s) Oral every 4 hours PRN Dyspepsia  hydrALAZINE Injectable 10 milliGRAM(s) IV Push every 6 hours PRN SBP > 180  ibuprofen  Tablet. 400 milliGRAM(s) Oral every 6 hours PRN Moderate Pain (4 - 6)  melatonin 3 milliGRAM(s) Oral at bedtime PRN Insomnia      Objective:  Vital Signs Last 24 Hrs  T(C): 36.9 (20 Jan 2023 14:43), Max: 37.1 (20 Jan 2023 05:35)  T(F): 98.5 (20 Jan 2023 14:43), Max: 98.8 (20 Jan 2023 05:35)  HR: 69 (20 Jan 2023 14:43) (63 - 71)  BP: 138/72 (20 Jan 2023 14:43) (114/68 - 165/71)  RR: 18 (20 Jan 2023 14:43) (18 - 20)  SpO2: 94% (20 Jan 2023 14:43) (91% - 95%)    Parameters below as of 20 Jan 2023 14:43  Patient On (Oxygen Delivery Method): room air      Physical Exam:  General: weak no acute distress  Neck: supple, trachea midline  Lungs: clear, no wheeze/rhonchi  Cardiovascular: regular rate and rhythm, S1 S2  Abdomen: soft, nontender,  bowel sounds normal  Neurological: alert and oriented x3  Skin: no rash  Extremities: trace edema      LABS:                        13.0   10.38 )-----------( 271      ( 20 Jan 2023 06:30 )             39.9   01-20    143  |  107  |  31<H>  ----------------------------<  100<H>  3.7   |  27  |  0.92    Ca    9.3      20 Jan 2023 06:30    MICROBIOLOGY:  Culture - Urine (collected 17 Jan 2023 17:55)  Source: Clean Catch Clean Catch (Midstream)  Final Report (18 Jan 2023 22:28):    No growth    Culture - Blood (collected 17 Jan 2023 13:59)  Source: .Blood Blood-Peripheral  Preliminary Report (18 Jan 2023 18:01):    No growth to date.    Culture - Blood (collected 17 Jan 2023 13:59)  Source: .Blood Blood-Peripheral  Preliminary Report (18 Jan 2023 18:01):    No growth to date.      RADIOLOGY & ADDITIONAL STUDIES:    ACC: [id number] EXAM:  CT CHEST   ORDERED BY: HARLEY LLANES     PROCEDURE DATE:  01/19/2023          INTERPRETATION:  CLINICAL INFORMATION: Fever.    COMPARISON: CT chest from 6/12/2021.    CONTRAST/COMPLICATIONS:  IV Contrast: NONE  Oral Contrast: NONE  Complications: None reported at time of study completion    PROCEDURE:  CT scan of the chest was obtained without intravenous contrast.    FINDINGS:    LYMPH NODES: No lymphadenopathy.    HEART/VASCULATURE: Heart size is within normal limits. No pericardial   effusion. Atherosclerotic calcifications of the coronary arteries and   thoracic aorta.    AIRWAYS/LUNGS/PLEURA: The central airways are patent. Bronchial wall   thickening within the lower lobes. Bibasilar subsegmental atelectasis. No   focal consolidations. 3 mm calcified granuloma in the right upper lobe   (3:65). Right middle lobe 6 mm nodule with a pleural attachment (3:89),   stable. Right lower lobe 5 mm nodule (3:92), stable.    UPPER ABDOMEN: Stable left renal pelviectasis. Nonobstructing 2 mm stone   in the right kidney lower pole. Diverticulosis.    BONES/SOFT TISSUES: Chronic healed left fourth through sixth rib   fractures. Mild degenerative changes of the thoracic spine.    IMPRESSION:    Bibasilar subsegmental atelectasis. No consolidation.      ACC: [id number] EXAM:  XR KNEE AP LAT OBL 3 VIEWS RT                        ACC: [id number] EXAM:  XR CHEST PORTABLE IMMED 1V                          PROCEDURE DATE:  01/17/2023          INTERPRETATION:  AP chest on January 17, 2020 3:02 PM. Patient has sepsis.    Heart suggest normal size.    On June 12, 2021 there was slight left lower lung infiltrates and there   is only minimal residual linear atelectasis at this time.    Right knee. 3 views. Patient has local pain.    Small effusion is suspected.    There are calcified menisci. No fracture.    IMPRESSION: No acute chest finding. Small right knee effusion and mild   degeneration.      ACC: [id number] EXAM:  CT RENAL STONE HUNT                          PROCEDURE DATE:  01/17/2023          INTERPRETATION:  CLINICAL INFORMATION: fever SCT    COMPARISON: 6.12.21.    CONTRAST/COMPLICATIONS:  IV Contrast: None  Oral Contrast: None  Complications: None    PROCEDURE:  CT of the Abdomen and Pelvis was performed.  Sagittal and coronal reformats were performed.    FINDINGS:    LOWER CHEST: A 3 mm right lower lobe nodule is unchanged.    LIVER: Within normal limits.  BILE DUCTS: Normal caliber.  GALLBLADDER: Within normal limits.  SPLEEN: Within normal limits.  PANCREAS: Within normal limits.  ADRENALS: Within normal limits.  KIDNEYS/URETERS: Chronic left UPJ obstruction with moderate   hydronephrosis is unchanged. No renal calculus.    BLADDER: Small diverticulum.  REPRODUCTIVE ORGANS: The prostate is enlarged.    BOWEL: No bowel obstruction.  PERITONEUM: No ascites.  VESSELS:  Within normal limits.  RETROPERITONEUM/LYMPH NODES: A 3.7 x 1.9 cm retroperitoneal cystic lesion   on series 3 image 81 previously 2.7 x 1.2 cm.  ABDOMINAL WALL: Within normal limits.  BONES: Within normal limits.    IMPRESSION: Chronic left UPJ obstruction with moderate hydronephrosis is   unchanged. No renal calculus.    A retroperitoneal cystic lesion is mildly increased in size; consider   follow-up MRI.      Assessment :   81 y/o M  PMH HTN, a fib, HLD, SVT, BPH CVA psoriasis resident of [place] long-term  AMS, HTN admitted with weakness acute febrile illness. wbc 17K febrile to 101. UA neg CT AP neg except for chronic  L UPJ obstruction with chronic hydro. CT chest neg for pna.  c/o joint pains/ Right > Left.  Thus far work up unrevealing  ?viral   ?inflammatory process  Cultures NGTD  WBC better  Fevers resolved    Plan :   Dc Rocephin as infectious work up susan  Trend temps and cbc  Pulm toileting  Asp precautions  Increase activity  PT   Stable from ID standpoint    D/w Hospitalist    Continue with present regiment.  Appropriate use of antibiotics and adverse effects reviewed.    > 35 minutes were spent in direct patient care reviewing notes, medications ,labs data/ imaging , discussion with multidisciplinary team.    Thank you for allowing me to participate in care of your patient .    Edel Llanes MD  Infectious Disease  [phone]
     SAMANTA BROWN is a yMale , patient examined and chart reviewed.     INTERVAL HPI/ OVERNIGHT EVENTS:   No events.  Afebrile.    PAST MEDICAL & SURGICAL HISTORY:  Hypertension  Atrial fibrillation  Asthma  No significant past surgical history        For details regarding the patient's social history, family history, and other miscellaneous elements, please refer the initial infectious diseases consultation and/or the admitting history and physical examination for this admission.      ROS:  CONSTITUTIONAL:  Negative fever or chills + weakness  EYES:  Negative  blurry vision or double vision  CARDIOVASCULAR:  Negative for chest pain or palpitations  RESPIRATORY:  Negative for cough, wheezing, or SOB   GASTROINTESTINAL:  Negative for nausea, vomiting, diarrhea, constipation, or abdominal pain  GENITOURINARY:  Negative frequency, urgency or dysuria  NEUROLOGIC:  No headache, confusion, dizziness, lightheadedness  All other systems were reviewed and are negative         Current inpatient medications :    ANTIBIOTICS/RELEVANT:    MEDICATIONS  (STANDING):  aspirin enteric coated 81 milliGRAM(s) Oral daily  atorvastatin 20 milliGRAM(s) Oral at bedtime  budesonide 160 MICROgram(s)/formoterol 4.5 MICROgram(s) Inhaler 2 Puff(s) Inhalation two times a day  cholecalciferol 1000 Unit(s) Oral daily  clobetasol 0.05% Cream 1 Application(s) Topical two times a day  enoxaparin Injectable 40 milliGRAM(s) SubCutaneous every 24 hours  flecainide 100 milliGRAM(s) Oral every 12 hours  hydrochlorothiazide 25 milliGRAM(s) Oral daily  metoprolol tartrate 25 milliGRAM(s) Oral every 12 hours  mirtazapine 7.5 milliGRAM(s) Oral daily  modafinil 100 milliGRAM(s) Oral daily  pantoprazole    Tablet 40 milliGRAM(s) Oral before breakfast  polyethylene glycol 3350 17 Gram(s) Oral two times a day  senna 2 Tablet(s) Oral at bedtime  tamsulosin 0.4 milliGRAM(s) Oral at bedtime    MEDICATIONS  (PRN):  acetaminophen     Tablet .. 650 milliGRAM(s) Oral every 6 hours PRN Temp greater or equal to 38C (100.4F), Mild Pain (1 - 3)  ibuprofen  Tablet. 400 milliGRAM(s) Oral every 6 hours PRN Moderate Pain (4 - 6)  melatonin 3 milliGRAM(s) Oral at bedtime PRN Insomnia      Objective:  Vital Signs Last 24 Hrs  T(C): 36.4 (24 Jan 2023 14:57), Max: 36.9 (24 Jan 2023 04:53)  T(F): 97.6 (24 Jan 2023 14:57), Max: 98.4 (24 Jan 2023 04:53)  HR: 60 (24 Jan 2023 14:57) (60 - 76)  BP: 145/75 (24 Jan 2023 14:57) (132/78 - 175/91)  RR: 20 (24 Jan 2023 14:57) (16 - 20)  SpO2: 95% (24 Jan 2023 14:57) (92% - 95%)    Parameters below as of 24 Jan 2023 14:57  Patient On (Oxygen Delivery Method): room air      Physical Exam:  General: weak no acute distress  Neck: supple, trachea midline  Lungs: clear, no wheeze/rhonchi  Cardiovascular: regular rate and rhythm, S1 S2  Abdomen: soft, nontender,  bowel sounds normal  Neurological: alert and oriented x3  Skin: no rash  Extremities: trace edema      LABS:      MICROBIOLOGY:  Culture - Urine (collected 17 Jan 2023 17:55)  Source: Clean Catch Clean Catch (Midstream)  Final Report (18 Jan 2023 22:28):    No growth    Culture - Blood (collected 17 Jan 2023 13:59)  Source: .Blood Blood-Peripheral  Preliminary Report (18 Jan 2023 18:01):    No growth to date.    Culture - Blood (collected 17 Jan 2023 13:59)  Source: .Blood Blood-Peripheral  Preliminary Report (18 Jan 2023 18:01):    No growth to date.      RADIOLOGY & ADDITIONAL STUDIES:    ACC: [id number] EXAM:  CT CHEST   ORDERED BY: HARLEY LLANES     PROCEDURE DATE:  01/19/2023          INTERPRETATION:  CLINICAL INFORMATION: Fever.    COMPARISON: CT chest from 6/12/2021.    CONTRAST/COMPLICATIONS:  IV Contrast: NONE  Oral Contrast: NONE  Complications: None reported at time of study completion    PROCEDURE:  CT scan of the chest was obtained without intravenous contrast.    FINDINGS:    LYMPH NODES: No lymphadenopathy.    HEART/VASCULATURE: Heart size is within normal limits. No pericardial   effusion. Atherosclerotic calcifications of the coronary arteries and   thoracic aorta.    AIRWAYS/LUNGS/PLEURA: The central airways are patent. Bronchial wall   thickening within the lower lobes. Bibasilar subsegmental atelectasis. No   focal consolidations. 3 mm calcified granuloma in the right upper lobe   (3:65). Right middle lobe 6 mm nodule with a pleural attachment (3:89),   stable. Right lower lobe 5 mm nodule (3:92), stable.    UPPER ABDOMEN: Stable left renal pelviectasis. Nonobstructing 2 mm stone   in the right kidney lower pole. Diverticulosis.    BONES/SOFT TISSUES: Chronic healed left fourth through sixth rib   fractures. Mild degenerative changes of the thoracic spine.    IMPRESSION:    Bibasilar subsegmental atelectasis. No consolidation.      ACC: [id number] EXAM:  XR KNEE AP LAT OBL 3 VIEWS RT                        ACC: [id number] EXAM:  XR CHEST PORTABLE IMMED 1V                          PROCEDURE DATE:  01/17/2023          INTERPRETATION:  AP chest on January 17, 2020 3:02 PM. Patient has sepsis.    Heart suggest normal size.    On June 12, 2021 there was slight left lower lung infiltrates and there   is only minimal residual linear atelectasis at this time.    Right knee. 3 views. Patient has local pain.    Small effusion is suspected.    There are calcified menisci. No fracture.    IMPRESSION: No acute chest finding. Small right knee effusion and mild   degeneration.      ACC: [id number] EXAM:  CT RENAL STONE HUNT                          PROCEDURE DATE:  01/17/2023          INTERPRETATION:  CLINICAL INFORMATION: fever SCT    COMPARISON: 6.12.21.    CONTRAST/COMPLICATIONS:  IV Contrast: None  Oral Contrast: None  Complications: None    PROCEDURE:  CT of the Abdomen and Pelvis was performed.  Sagittal and coronal reformats were performed.    FINDINGS:    LOWER CHEST: A 3 mm right lower lobe nodule is unchanged.    LIVER: Within normal limits.  BILE DUCTS: Normal caliber.  GALLBLADDER: Within normal limits.  SPLEEN: Within normal limits.  PANCREAS: Within normal limits.  ADRENALS: Within normal limits.  KIDNEYS/URETERS: Chronic left UPJ obstruction with moderate   hydronephrosis is unchanged. No renal calculus.    BLADDER: Small diverticulum.  REPRODUCTIVE ORGANS: The prostate is enlarged.    BOWEL: No bowel obstruction.  PERITONEUM: No ascites.  VESSELS:  Within normal limits.  RETROPERITONEUM/LYMPH NODES: A 3.7 x 1.9 cm retroperitoneal cystic lesion   on series 3 image 81 previously 2.7 x 1.2 cm.  ABDOMINAL WALL: Within normal limits.  BONES: Within normal limits.    IMPRESSION: Chronic left UPJ obstruction with moderate hydronephrosis is   unchanged. No renal calculus.    A retroperitoneal cystic lesion is mildly increased in size; consider   follow-up MRI.      Assessment :   83 y/o M  PMH HTN, a fib, HLD, SVT, BPH CVA psoriasis resident of [facility]  AMS, HTN admitted with weakness acute febrile illness. wbc 17K febrile to 101. UA neg CT AP neg except for chronic  L UPJ obstruction with chronic hydro. CT chest neg for pna.  c/o joint pains/ Right > Left.  Thus far work up unrevealing  ?viral   ?inflammatory process  Cultures NGTD  WBC better  Fevers resolved  Clinically better    Plan :   Monitor off antibiotics  Trend temps and cbc  Pulm toileting  Asp precautions  Increase activity  PT   Stable from ID standpoint        Continue with present regiment.  Appropriate use of antibiotics and adverse effects reviewed.    > 35 minutes were spent in direct patient care reviewing notes, medications ,labs data/ imaging , discussion with multidisciplinary team.    Thank you for allowing me to participate in care of your patient .    Edel Llanes MD  Infectious Disease  [phone]
Chief Complaint: AMS, HTN, fever    Interval Events: No events overnight.    Review of Systems:  General: No fevers, chills, weight gain  Skin: No rashes, color changes  Cardiovascular: No chest pain, orthopnea  Respiratory: No shortness of breath, cough  Gastrointestinal: No nausea, abdominal pain  Genitourinary: No incontinence, pain with urination  Musculoskeletal: No pain, swelling, decreased range of motion  Neurological: No headache, weakness  Psychiatric: No depression, anxiety  Endocrine: No weight gain, increased thirst  All other systems are comprehensively negative.    Physical Exam:  Vital Signs Last 24 Hrs  T(C): 36.7 (26 Jan 2023 05:18), Max: 36.7 (25 Jan 2023 12:12)  T(F): 98 (26 Jan 2023 05:18), Max: 98 (25 Jan 2023 12:12)  HR: 61 (26 Jan 2023 05:18) (61 - 66)  BP: 127/72 (26 Jan 2023 05:18) (107/66 - 147/82)  BP(mean): --  RR: 18 (26 Jan 2023 05:18) (18 - 18)  SpO2: 93% (26 Jan 2023 05:18) (93% - 94%)  Parameters below as of 26 Jan 2023 05:18  Patient On (Oxygen Delivery Method): room air  General: NAD  HEENT: MMM  Neck: No JVD, no carotid bruit  Lungs: CTAB  CV: RRR, nl S1/S2, no M/R/G  Abdomen: S/NT/ND, +BS  Extremities: No LE edema, no cyanosis  Neuro: AAOx3, non-focal  Skin: No rash    Labs:        ECG/Telemetry: Sinus rhythm
Chief Complaint: AMS, HTN, fever    Interval Events: No events overnight.    Review of Systems:  General: No fevers, chills, weight gain  Skin: No rashes, color changes  Cardiovascular: No chest pain, orthopnea  Respiratory: No shortness of breath, cough  Gastrointestinal: No nausea, abdominal pain  Genitourinary: No incontinence, pain with urination  Musculoskeletal: No pain, swelling, decreased range of motion  Neurological: No headache, weakness  Psychiatric: No depression, anxiety  Endocrine: No weight gain, increased thirst  All other systems are comprehensively negative.    Physical Exam:  Vital Signs Last 24 Hrs  T(C): 36.9 (24 Jan 2023 04:53), Max: 36.9 (24 Jan 2023 04:53)  T(F): 98.4 (24 Jan 2023 04:53), Max: 98.4 (24 Jan 2023 04:53)  HR: 70 (24 Jan 2023 04:53) (61 - 70)  BP: 135/78 (24 Jan 2023 04:53) (135/78 - 175/91)  BP(mean): --  RR: 18 (24 Jan 2023 04:53) (16 - 18)  SpO2: 92% (24 Jan 2023 04:53) (92% - 95%)  Parameters below as of 24 Jan 2023 04:53  Patient On (Oxygen Delivery Method): room air  General: NAD  HEENT: MMM  Neck: No JVD, no carotid bruit  Lungs: CTAB  CV: RRR, nl S1/S2, no M/R/G  Abdomen: S/NT/ND, +BS  Extremities: No LE edema, no cyanosis  Neuro: AAOx3, non-focal  Skin: No rash    Labs:        ECG/Telemetry: Sinus rhythm
Chief Complaint: AMS, HTN, fever    Interval Events: No events overnight.    Review of Systems:  General: No fevers, chills, weight gain  Skin: No rashes, color changes  Cardiovascular: No chest pain, orthopnea  Respiratory: No shortness of breath, cough  Gastrointestinal: No nausea, abdominal pain  Genitourinary: No incontinence, pain with urination  Musculoskeletal: No pain, swelling, decreased range of motion  Neurological: No headache, weakness  Psychiatric: No depression, anxiety  Endocrine: No weight gain, increased thirst  All other systems are comprehensively negative.    Physical Exam:  Vital Signs Last 24 Hrs  T(C): 37.1 (20 Jan 2023 05:35), Max: 37.3 (19 Jan 2023 12:00)  T(F): 98.8 (20 Jan 2023 05:35), Max: 99.1 (19 Jan 2023 12:00)  HR: 68 (20 Jan 2023 05:35) (63 - 70)  BP: 148/79 (20 Jan 2023 05:35) (114/68 - 165/71)  BP(mean): --  RR: 18 (20 Jan 2023 05:35) (17 - 20)  SpO2: 92% (20 Jan 2023 05:35) (91% - 98%)  Parameters below as of 20 Jan 2023 05:35  Patient On (Oxygen Delivery Method): room air  General: NAD  HEENT: MMM  Neck: No JVD, no carotid bruit  Lungs: CTAB  CV: RRR, nl S1/S2, no M/R/G  Abdomen: S/NT/ND, +BS  Extremities: No LE edema, no cyanosis  Neuro: AAOx3, non-focal  Skin: No rash    Labs:             01-19    143  |  107  |  33<H>  ----------------------------<  106<H>  3.4<L>   |  30  |  1.33<H>    Ca    9.2      19 Jan 2023 08:17                          13.4   12.79 )-----------( 243      ( 19 Jan 2023 08:17 )             41.3       ECG/Telemetry: Sinus rhythm
Chief Complaint: AMS, HTN, fever    Interval Events: No events overnight.    Review of Systems:  General: No fevers, chills, weight gain  Skin: No rashes, color changes  Cardiovascular: No chest pain, orthopnea  Respiratory: No shortness of breath, cough  Gastrointestinal: No nausea, abdominal pain  Genitourinary: No incontinence, pain with urination  Musculoskeletal: No pain, swelling, decreased range of motion  Neurological: No headache, weakness  Psychiatric: No depression, anxiety  Endocrine: No weight gain, increased thirst  All other systems are comprehensively negative.    Physical Exam:  Vitals:        Vital Signs Last 24 Hrs  T(C): 36.5 (19 Jan 2023 05:04), Max: 37.5 (18 Jan 2023 17:36)  T(F): 97.7 (19 Jan 2023 05:04), Max: 99.5 (18 Jan 2023 17:36)  HR: 68 (19 Jan 2023 05:04) (60 - 81)  BP: 146/87 (19 Jan 2023 05:04) (118/78 - 156/69)  BP(mean): --  RR: 18 (19 Jan 2023 05:04) (16 - 19)  SpO2: 96% (19 Jan 2023 05:04) (90% - 97%)  Parameters below as of 19 Jan 2023 05:04  Patient On (Oxygen Delivery Method): nasal cannula  O2 Flow (L/min): 2  General: NAD  HEENT: MMM  Neck: No JVD, no carotid bruit  Lungs: CTAB  CV: RRR, nl S1/S2, no M/R/G  Abdomen: S/NT/ND, +BS  Extremities: No LE edema, no cyanosis  Neuro: AAOx3, non-focal  Skin: No rash    Labs:                        13.4   12.79 )-----------( 243      ( 19 Jan 2023 08:17 )             41.3     01-19    143  |  107  |  33<H>  ----------------------------<  106<H>  3.4<L>   |  30  |  1.33<H>    Ca    9.2      19 Jan 2023 08:17  Mg     2.2     01-18    TPro  6.9  /  Alb  2.6<L>  /  TBili  0.8  /  DBili  x   /  AST  16  /  ALT  <10<L>  /  AlkPhos  60  01-18        PT/INR - ( 17 Jan 2023 13:59 )   PT: 18.8 sec;   INR: 1.59 ratio         PTT - ( 17 Jan 2023 13:59 )  PTT:32.0 sec    ECG/Telemetry: Sinus rhythm
PROGRESS NOTE:   Authoted by Dr. Anna Phillips MD, Available on MS Teams    Patient is a 82y old  Male who presents with a chief complaint of AMS, fever, high BP (21 Jan 2023 15:57)      SUBJECTIVE / OVERNIGHT EVENTS: Patient has some R knee pain, improving. Walked to the bathroom     ADDITIONAL REVIEW OF SYSTEMS:    MEDICATIONS  (STANDING):  aspirin enteric coated 81 milliGRAM(s) Oral daily  atorvastatin 20 milliGRAM(s) Oral at bedtime  budesonide 160 MICROgram(s)/formoterol 4.5 MICROgram(s) Inhaler 2 Puff(s) Inhalation two times a day  cholecalciferol 1000 Unit(s) Oral daily  enoxaparin Injectable 40 milliGRAM(s) SubCutaneous every 24 hours  flecainide 100 milliGRAM(s) Oral every 12 hours  metoprolol tartrate 25 milliGRAM(s) Oral every 12 hours  mirtazapine 7.5 milliGRAM(s) Oral daily  modafinil 100 milliGRAM(s) Oral daily  pantoprazole    Tablet 40 milliGRAM(s) Oral before breakfast  polyethylene glycol 3350 17 Gram(s) Oral two times a day  senna 2 Tablet(s) Oral at bedtime  tamsulosin 0.4 milliGRAM(s) Oral at bedtime    MEDICATIONS  (PRN):  acetaminophen     Tablet .. 650 milliGRAM(s) Oral every 6 hours PRN Temp greater or equal to 38C (100.4F), Mild Pain (1 - 3)  ALPRAZolam 0.25 milliGRAM(s) Oral every 12 hours PRN anxiety  aluminum hydroxide/magnesium hydroxide/simethicone Suspension 30 milliLiter(s) Oral every 4 hours PRN Dyspepsia  hydrALAZINE Injectable 10 milliGRAM(s) IV Push every 6 hours PRN SBP > 180  ibuprofen  Tablet. 400 milliGRAM(s) Oral every 6 hours PRN Moderate Pain (4 - 6)  melatonin 3 milliGRAM(s) Oral at bedtime PRN Insomnia      CAPILLARY BLOOD GLUCOSE        I&O's Summary    21 Jan 2023 07:01  -  22 Jan 2023 07:00  --------------------------------------------------------  IN: 50 mL / OUT: 160 mL / NET: -110 mL        PHYSICAL EXAM:  Vital Signs Last 24 Hrs  T(C): 36.4 (22 Jan 2023 10:30), Max: 36.8 (21 Jan 2023 22:00)  T(F): 97.6 (22 Jan 2023 10:30), Max: 98.2 (21 Jan 2023 22:00)  HR: 70 (22 Jan 2023 10:30) (64 - 75)  BP: 158/84 (22 Jan 2023 10:30) (134/74 - 163/72)  BP(mean): --  RR: 18 (22 Jan 2023 10:30) (18 - 18)  SpO2: 94% (22 Jan 2023 10:30) (94% - 96%)    Parameters below as of 22 Jan 2023 10:30  Patient On (Oxygen Delivery Method): room air        CONSTITUTIONAL: NAD, well-developed  RESPIRATORY: Normal respiratory effort; lungs are clear to auscultation bilaterally  CARDIOVASCULAR: Regular rate and rhythm, normal S1 and S2, no murmur/rub/gallop; No lower extremity edema  ABDOMEN: Nontender to palpation, normoactive bowel sounds, no rebound/guarding  MUSCLOSKELETAL: no clubbing or cyanosis of digits; RLE ROM limited due to pain  PSYCH: A+O to person, place, and time; affect appropriate  NEURO: moving all extremities    LABS:                        14.4   7.65  )-----------( 294      ( 22 Jan 2023 06:30 )             43.6     01-22    140  |  105  |  32<H>  ----------------------------<  91  3.8   |  26  |  0.87    Ca    9.2      22 Jan 2023 06:30  Phos  3.2     01-22  Mg     1.9     01-22
Patient is a 82y old  Male who presents with a chief complaint of AMS, fever, high BP (24 Jan 2023 13:22)      INTERVAL HPI/OVERNIGHT EVENTS:    no overnight events.  daughter at bedside, all questions and concerns addressed.      MEDICATIONS  (STANDING):  aspirin enteric coated 81 milliGRAM(s) Oral daily  atorvastatin 20 milliGRAM(s) Oral at bedtime  budesonide 160 MICROgram(s)/formoterol 4.5 MICROgram(s) Inhaler 2 Puff(s) Inhalation two times a day  cholecalciferol 1000 Unit(s) Oral daily  clobetasol 0.05% Cream 1 Application(s) Topical two times a day  enoxaparin Injectable 40 milliGRAM(s) SubCutaneous every 24 hours  flecainide 100 milliGRAM(s) Oral every 12 hours  hydrochlorothiazide 25 milliGRAM(s) Oral daily  metoprolol tartrate 25 milliGRAM(s) Oral every 12 hours  mirtazapine 7.5 milliGRAM(s) Oral daily  modafinil 100 milliGRAM(s) Oral daily  pantoprazole    Tablet 40 milliGRAM(s) Oral before breakfast  polyethylene glycol 3350 17 Gram(s) Oral two times a day  senna 2 Tablet(s) Oral at bedtime  tamsulosin 0.4 milliGRAM(s) Oral at bedtime    MEDICATIONS  (PRN):  acetaminophen     Tablet .. 650 milliGRAM(s) Oral every 6 hours PRN Temp greater or equal to 38C (100.4F), Mild Pain (1 - 3)  ALPRAZolam 0.25 milliGRAM(s) Oral every 12 hours PRN anxiety  ibuprofen  Tablet. 400 milliGRAM(s) Oral every 6 hours PRN Moderate Pain (4 - 6)  melatonin 3 milliGRAM(s) Oral at bedtime PRN Insomnia      Allergies    No Known Allergies    Intolerances        REVIEW OF SYSTEMS:  no overnight events    Vital Signs Last 24 Hrs  T(C): 36.7 (25 Jan 2023 12:12), Max: 36.7 (24 Jan 2023 21:08)  T(F): 98 (25 Jan 2023 12:12), Max: 98.1 (24 Jan 2023 21:08)  HR: 62 (25 Jan 2023 12:12) (60 - 62)  BP: 107/66 (25 Jan 2023 12:12) (107/66 - 152/78)  BP(mean): --  RR: 18 (25 Jan 2023 12:12) (18 - 20)  SpO2: 94% (25 Jan 2023 12:12) (94% - 96%)    Parameters below as of 25 Jan 2023 12:12  Patient On (Oxygen Delivery Method): room air        PHYSICAL EXAM:  GENERAL: NAD  HEAD:  Atraumatic, Normocephalic  EYES: EOMI, PERRLA, conjunctiva and sclera clear  ENMT: Moist mucous membranes, No lesions; No tonsillar erythema, exudates, or enlargement  NECK: Supple, No JVD, Normal thyroid  NERVOUS SYSTEM:  Alert & Oriented X3, Good concentration; All 4 extremities mobile, no gross sensory deficits.   CHEST/LUNG: Clear to auscultation bilaterally; No rales, rhonchi, wheezing, or rubs  HEART: Regular rate and rhythm; No murmurs, rubs, or gallops  ABDOMEN: Soft, Nontender, Nondistended; Bowel sounds present  EXTREMITIES:  2+ Peripheral Pulses, No clubbing, cyanosis, or edema    LABS:              CAPILLARY BLOOD GLUCOSE          RADIOLOGY & ADDITIONAL TESTS:    
PROGRESS NOTE:   Authoted by Dr. Anna Phillips MD, Available on MS Teams    Patient is a 82y old  Male who presents with a chief complaint of AMS, fever, high BP (23 Jan 2023 13:07)      SUBJECTIVE / OVERNIGHT EVENTS: Patient feels okay. Has some LLE pain today. No chest pain or shortness of breath    ADDITIONAL REVIEW OF SYSTEMS:    MEDICATIONS  (STANDING):  aspirin enteric coated 81 milliGRAM(s) Oral daily  atorvastatin 20 milliGRAM(s) Oral at bedtime  budesonide 160 MICROgram(s)/formoterol 4.5 MICROgram(s) Inhaler 2 Puff(s) Inhalation two times a day  cholecalciferol 1000 Unit(s) Oral daily  clobetasol 0.05% Cream 1 Application(s) Topical two times a day  enoxaparin Injectable 40 milliGRAM(s) SubCutaneous every 24 hours  flecainide 100 milliGRAM(s) Oral every 12 hours  hydrochlorothiazide 25 milliGRAM(s) Oral daily  metoprolol tartrate 25 milliGRAM(s) Oral every 12 hours  mirtazapine 7.5 milliGRAM(s) Oral daily  modafinil 100 milliGRAM(s) Oral daily  pantoprazole    Tablet 40 milliGRAM(s) Oral before breakfast  polyethylene glycol 3350 17 Gram(s) Oral two times a day  senna 2 Tablet(s) Oral at bedtime  tamsulosin 0.4 milliGRAM(s) Oral at bedtime    MEDICATIONS  (PRN):  acetaminophen     Tablet .. 650 milliGRAM(s) Oral every 6 hours PRN Temp greater or equal to 38C (100.4F), Mild Pain (1 - 3)  ibuprofen  Tablet. 400 milliGRAM(s) Oral every 6 hours PRN Moderate Pain (4 - 6)  melatonin 3 milliGRAM(s) Oral at bedtime PRN Insomnia      CAPILLARY BLOOD GLUCOSE        I&O's Summary      PHYSICAL EXAM:  Vital Signs Last 24 Hrs  T(C): 36.9 (24 Jan 2023 04:53), Max: 36.9 (24 Jan 2023 04:53)  T(F): 98.4 (24 Jan 2023 04:53), Max: 98.4 (24 Jan 2023 04:53)  HR: 70 (24 Jan 2023 04:53) (61 - 70)  BP: 135/78 (24 Jan 2023 04:53) (135/78 - 175/91)  BP(mean): --  RR: 18 (24 Jan 2023 04:53) (16 - 18)  SpO2: 92% (24 Jan 2023 04:53) (92% - 95%)    Parameters below as of 24 Jan 2023 04:53  Patient On (Oxygen Delivery Method): room air        CONSTITUTIONAL: NAD, well-developed  RESPIRATORY: Normal respiratory effort; lungs are clear to auscultation bilaterally  CARDIOVASCULAR: Regular rate and rhythm, normal S1 and S2, no murmur/rub/gallop; No lower extremity edema  ABDOMEN: Nontender to palpation, normoactive bowel sounds, no rebound/guarding  MUSCLOSKELETAL: no clubbing or cyanosis of digits; no joint swelling or tenderness to palpation  PSYCH: A+O to person, place, and time; affect appropriate
Patient is a 82y Male with a known history of :  Fever [R50.9]    Accelerated hypertension [I10]    BPH (benign prostatic hyperplasia) [N40.0]    Dyslipidemia [E78.5]    Paroxysmal atrial fibrillation [I48.0]    Altered mental status [R41.82]      HPI:  This is an 83 y/o M with PMH HTN, a fib, HLD, SVT, BPH CVA presents to ED from the [facility] for AMS, HTN and fever. Per EMS suspected UTI as her has been urinating more frequently. Pt states he feels "off". Denies HA, chest pain, SOB, nausea vomiting. States "they told me my BP was high". As per RN pt c.o R knee pain. Pt states he fell a few days ago. Found to be febrile 101 rectally in ED. Daughter reports that patient's sleep-wake cycle has been off the past couple of weeks -- he's been sleeping more during the day and is up at night. She reports that he's missed several meals in the [place] dining room. He usually walks independently with a walker. She saw him on 1/14/23 and he seemed to be in his usual state of health. Currently in ER patient is back to his baseline -- he is A & O x 4. Daughter reports that patient was recently taken off his BP meds because his BP was running low. He has a known h/o L UPJ obstruction -- he had seen a urologist and told that no further intervention was needed at that time.    (17 Jan 2023 22:12)      REVIEW OF SYSTEMS:    CONSTITUTIONAL: No fever, weight loss, or fatigue  EYES: No eye pain, visual disturbances, or discharge  ENMT:  No difficulty hearing, tinnitus, vertigo; No sinus or throat pain  NECK: No pain or stiffness  BREASTS: No pain, masses, or nipple discharge  RESPIRATORY: No cough, wheezing, chills or hemoptysis; No shortness of breath  CARDIOVASCULAR: No chest pain, palpitations, dizziness, or leg swelling  GASTROINTESTINAL: No abdominal or epigastric pain. No nausea, vomiting, or hematemesis; No diarrhea or constipation. No melena or hematochezia.  GENITOURINARY: No dysuria, frequency, hematuria, or incontinence  NEUROLOGICAL: No headaches, memory loss, loss of strength, numbness, or tremors  SKIN: No itching, burning, rashes, or lesions   LYMPH NODES: No enlarged glands  ENDOCRINE: No heat or cold intolerance; No hair loss  MUSCULOSKELETAL: No joint pain or swelling; No muscle, back, or extremity pain  PSYCHIATRIC: No depression, anxiety, mood swings, or difficulty sleeping  HEME/LYMPH: No easy bruising, or bleeding gums  ALLERGY AND IMMUNOLOGIC: No hives or eczema    MEDICATIONS  (STANDING):  aspirin enteric coated 81 milliGRAM(s) Oral daily  atorvastatin 20 milliGRAM(s) Oral at bedtime  budesonide 160 MICROgram(s)/formoterol 4.5 MICROgram(s) Inhaler 2 Puff(s) Inhalation two times a day  cholecalciferol 1000 Unit(s) Oral daily  enoxaparin Injectable 40 milliGRAM(s) SubCutaneous every 24 hours  flecainide 100 milliGRAM(s) Oral every 12 hours  metoprolol tartrate 25 milliGRAM(s) Oral every 12 hours  mirtazapine 7.5 milliGRAM(s) Oral daily  modafinil 100 milliGRAM(s) Oral daily  pantoprazole    Tablet 40 milliGRAM(s) Oral before breakfast  polyethylene glycol 3350 17 Gram(s) Oral two times a day  senna 2 Tablet(s) Oral at bedtime  tamsulosin 0.4 milliGRAM(s) Oral at bedtime    MEDICATIONS  (PRN):  acetaminophen     Tablet .. 650 milliGRAM(s) Oral every 6 hours PRN Temp greater or equal to 38C (100.4F), Mild Pain (1 - 3)  ALPRAZolam 0.25 milliGRAM(s) Oral every 12 hours PRN anxiety  aluminum hydroxide/magnesium hydroxide/simethicone Suspension 30 milliLiter(s) Oral every 4 hours PRN Dyspepsia  hydrALAZINE Injectable 10 milliGRAM(s) IV Push every 6 hours PRN SBP > 180  ibuprofen  Tablet. 400 milliGRAM(s) Oral every 6 hours PRN Moderate Pain (4 - 6)  melatonin 3 milliGRAM(s) Oral at bedtime PRN Insomnia      ALLERGIES: No Known Allergies      FAMILY HISTORY:      Social history:  Alochol:   Smoking:   Drug Use:   Marital Status:     PHYSICAL EXAMINATION:  -----------------------------  T(C): 36.5 (01-21-23 @ 13:30), Max: 36.8 (01-21-23 @ 09:30)  HR: 75 (01-21-23 @ 13:30) (58 - 92)  BP: 144/74 (01-21-23 @ 13:30) (122/70 - 166/90)  RR: 18 (01-21-23 @ 13:30) (18 - 19)  SpO2: 96% (01-21-23 @ 13:30) (94% - 96%)  Wt(kg): --    01-20 @ 07:01  -  01-21 @ 07:00  --------------------------------------------------------  IN:  Total IN: 0 mL    OUT:    Voided (mL): 125 mL  Total OUT: 125 mL    Total NET: -125 mL            Constitutional: well developed, normal appearance, well groomed, well nourished, no deformities and no acute distress.   Eyes: the conjunctiva exhibited no abnormalities and the eyelids demonstrated no xanthelasmas.   HEENT: normal oral mucosa, no oral pallor and no oral cyanosis.   Neck: normal jugular venous A waves present, normal jugular venous V waves present and no jugular venous tellez A waves.   Pulmonary: no respiratory distress, normal respiratory rhythm and effort, no accessory muscle use and lungs were clear to auscultation bilaterally. Anteriorly  Cardiovascular: heart rate and rhythm were normal, normal S1 and S2 and no murmur, gallop, rub, heave or thrill are present.   Musculoskeletal: the gait could not be assessed.  Extremities: no clubbing of the fingernails, no localized cyanosis, no petechial hemorrhages and no ischemic changes.   Skin: normal skin color and pigmentation, no rash, no venous stasis, no skin lesions, no skin ulcer and no xanthoma was observed.     LABS:   --------  01-21    144  |  105  |  38<H>  ----------------------------<  157<H>  4.0   |  29  |  0.93    Ca    9.2      21 Jan 2023 08:51  Phos  3.5     01-21  Mg     1.8     01-21                           13.9   8.68  )-----------( 263      ( 21 Jan 2023 08:51 )             42.7                   Radiology:    < from: US Transthoracic Echocardiogram w/Doppler Complete (01.18.23 @ 09:07) >    ACC: [id number] EXAM:  US TTE W DOPPLER COMPLETE                          PROCEDURE DATE:          INTERPRETATION:  Indication: Hypertension    Technician: Aurora Guy    Study Quality: Fair    Height 5 feet 8 inches, weight 200 pounds, blood pressure 167/76 mmHg    Measurements: Aortic root size 2.9 cm, left atrial size 3.7 cm, right   atrial size 3.9 cm, right ventricular size 2.4 cm, left ventricular   end-diastolic diameter 4.9 cm, left ventricular end-systolic diameter 3.1   cm, septal wall thickness 1.3 cm, posterior thickness 1.3 cm, aortic   velocity 1.8 m/s, mitral E velocity 0.4 m/s, ejection fraction   approximately 55-60%.    Mitral Valve: Mildly thickened mitral valve with normal opening. Trace   mitral regurgitation  Aortic Valve: Thickened aortic valve with normal cusp excursion. Normal   aortic root size. There is mild aortic regurgitation  Left Atrium: Normal size  Left Ventricle: Normal size, mild left ventricular hypertrophy with   normal overall left ventricular systolic function with stage I diastolic   dysfunction  Pericardium: No pericardial effusion  Tricuspid Valve: Appears normal with normal opening. Trace tricuspid   regurgitation with normal right ventricular systolic pressure 23 mmHg.   IVC size 2.0 cmwith more than 50% respiratory variation  Pulmonic Valve: Appears normal, mild pulmonic regurgitation  Right Ventricle: Normal size with normal right ventricular systolic   function  Right Atrium: Normal size    IMPRESSION:  1. Mild left ventricular hypertrophy with normal left ventricular   systolic function with stage I diastolic dysfunction  2. aortic sclerosis, mild aortic regurgitation    --- End of Report ---            [facility]  This document has been electronically signed. Jan 18 2023 10:46AM    < end of copied text >  
Patient is a 82y Male with a known history of :  Fever [R50.9]    Accelerated hypertension [I10]    BPH (benign prostatic hyperplasia) [N40.0]    Dyslipidemia [E78.5]    Paroxysmal atrial fibrillation [I48.0]    Altered mental status [R41.82]      HPI:  This is an 83 y/o M with PMH HTN, a fib, HLD, SVT, BPH CVA presents to ED from the [facility] for AMS, HTN and fever. Per EMS suspected UTI as her has been urinating more frequently. Pt states he feels "off". Denies HA, chest pain, SOB, nausea vomiting. States "they told me my BP was high". As per RN pt c.o R knee pain. Pt states he fell a few days ago. Found to be febrile 101 rectally in ED. Daughter reports that patient's sleep-wake cycle has been off the past couple of weeks -- he's been sleeping more during the day and is up at night. She reports that he's missed several meals in the [place] dining room. He usually walks independently with a walker. She saw him on 1/14/23 and he seemed to be in his usual state of health. Currently in ER patient is back to his baseline -- he is A & O x 4. Daughter reports that patient was recently taken off his BP meds because his BP was running low. He has a known h/o L UPJ obstruction -- he had seen a urologist and told that no further intervention was needed at that time.    (17 Jan 2023 22:12)      REVIEW OF SYSTEMS:    CONSTITUTIONAL: No fever, weight loss, or fatigue  EYES: No eye pain, visual disturbances, or discharge  ENMT:  No difficulty hearing, tinnitus, vertigo; No sinus or throat pain  NECK: No pain or stiffness  BREASTS: No pain, masses, or nipple discharge  RESPIRATORY: No cough, wheezing, chills or hemoptysis; No shortness of breath  CARDIOVASCULAR: No chest pain, palpitations, dizziness, or leg swelling  GASTROINTESTINAL: No abdominal or epigastric pain. No nausea, vomiting, or hematemesis; No diarrhea or constipation. No melena or hematochezia.  GENITOURINARY: No dysuria, frequency, hematuria, or incontinence  NEUROLOGICAL: No headaches, memory loss, loss of strength, numbness, or tremors  SKIN: No itching, burning, rashes, or lesions   LYMPH NODES: No enlarged glands  ENDOCRINE: No heat or cold intolerance; No hair loss  MUSCULOSKELETAL: No joint pain or swelling; No muscle, back, or extremity pain  PSYCHIATRIC: No depression, anxiety, mood swings, or difficulty sleeping  HEME/LYMPH: No easy bruising, or bleeding gums  ALLERGY AND IMMUNOLOGIC: No hives or eczema    MEDICATIONS  (STANDING):  aspirin enteric coated 81 milliGRAM(s) Oral daily  atorvastatin 20 milliGRAM(s) Oral at bedtime  budesonide 160 MICROgram(s)/formoterol 4.5 MICROgram(s) Inhaler 2 Puff(s) Inhalation two times a day  cholecalciferol 1000 Unit(s) Oral daily  clobetasol 0.05% Cream 1 Application(s) Topical two times a day  enoxaparin Injectable 40 milliGRAM(s) SubCutaneous every 24 hours  flecainide 100 milliGRAM(s) Oral every 12 hours  metoprolol tartrate 25 milliGRAM(s) Oral every 12 hours  mirtazapine 7.5 milliGRAM(s) Oral daily  modafinil 100 milliGRAM(s) Oral daily  pantoprazole    Tablet 40 milliGRAM(s) Oral before breakfast  polyethylene glycol 3350 17 Gram(s) Oral two times a day  senna 2 Tablet(s) Oral at bedtime  tamsulosin 0.4 milliGRAM(s) Oral at bedtime    MEDICATIONS  (PRN):  acetaminophen     Tablet .. 650 milliGRAM(s) Oral every 6 hours PRN Temp greater or equal to 38C (100.4F), Mild Pain (1 - 3)  ALPRAZolam 0.25 milliGRAM(s) Oral every 12 hours PRN anxiety  aluminum hydroxide/magnesium hydroxide/simethicone Suspension 30 milliLiter(s) Oral every 4 hours PRN Dyspepsia  hydrALAZINE Injectable 10 milliGRAM(s) IV Push every 6 hours PRN SBP > 180  ibuprofen  Tablet. 400 milliGRAM(s) Oral every 6 hours PRN Moderate Pain (4 - 6)  melatonin 3 milliGRAM(s) Oral at bedtime PRN Insomnia      ALLERGIES: No Known Allergies      FAMILY HISTORY:      Social history:  Alochol:   Smoking:   Drug Use:   Marital Status:     PHYSICAL EXAMINATION:  -----------------------------  ZAYNAB): 36.4 (01-22-23 @ 10:30), Max: 36.8 (01-21-23 @ 22:00)  HR: 70 (01-22-23 @ 10:30) (64 - 75)  BP: 158/84 (01-22-23 @ 10:30) (134/74 - 163/72)  RR: 18 (01-22-23 @ 10:30) (18 - 18)  SpO2: 94% (01-22-23 @ 10:30) (94% - 96%)  Wt(kg): --    01-21 @ 07:01  -  01-22 @ 07:00  --------------------------------------------------------  IN:    Oral Fluid: 50 mL  Total IN: 50 mL    OUT:    Voided (mL): 160 mL  Total OUT: 160 mL    Total NET: -110 mL            Constitutional: well developed, normal appearance, well groomed, well nourished, no deformities and no acute distress.   Eyes: the conjunctiva exhibited no abnormalities and the eyelids demonstrated no xanthelasmas.   HEENT: normal oral mucosa, no oral pallor and no oral cyanosis.   Neck: normal jugular venous A waves present, normal jugular venous V waves present and no jugular venous tellez A waves.   Pulmonary: no respiratory distress, normal respiratory rhythm and effort, no accessory muscle use and lungs were clear to auscultation bilaterally. Anteriorly  Cardiovascular: heart rate and rhythm were normal, normal S1 and S2 and no murmur, gallop, rub, heave or thrill are present.    Musculoskeletal: the gait could not be assessed.   Extremities: no clubbing of the fingernails, no localized cyanosis, no petechial hemorrhages and no ischemic changes.   Skin: normal skin color and pigmentation, no rash, no venous stasis, no skin lesions, no skin ulcer and no xanthoma was observed.       LABS:   --------  01-22    140  |  105  |  32<H>  ----------------------------<  91  3.8   |  26  |  0.87    Ca    9.2      22 Jan 2023 06:30  Phos  3.2     01-22  Mg     1.9     01-22                           14.4   7.65  )-----------( 294      ( 22 Jan 2023 06:30 )             43.6                   Radiology:    
Patient is a 82y old  Male who presents with a chief complaint of AMS, fever, high BP (18 Jan 2023 07:54)    HPI: This is an 81 y/o M with PMH HTN, a fib, HLD, SVT, BPH CVA presents to ED from the [facility] for AMS, HTN and fever. Per EMS suspected UTI as her has been urinating more frequently. Pt states he feels "off". Denies HA, chest pain, SOB, nausea vomiting. States "they told me my BP was high". As per RN pt c.o R knee pain. Pt states he fell a few days ago. Found to be febrile 101 rectally in ED. Daughter reports that patient's sleep-wake cycle has been off the past couple of weeks -- he's been sleeping more during the day and is up at night. She reports that he's missed several meals in the [facility] dining room. He usually walks independently with a walker. She saw him on 1/14/23 and he seemed to be in his usual state of health. Currently in ER patient is back to his baseline -- he is A & O x 4. Daughter reports that patient was recently taken off his BP meds because his BP was running low. He has a known h/o L UPJ obstruction -- he had seen a urologist and told that no further intervention was needed at that time.    (17 Jan 2023 22:12)  No facial asymmetry  No lateralized weakness.  No LOC.  No shaking or seizures.    Interval History -     No distress.    MEDICATIONS  (STANDING):    aspirin enteric coated 81 milliGRAM(s) Oral daily  atorvastatin 20 milliGRAM(s) Oral at bedtime  budesonide 160 MICROgram(s)/formoterol 4.5 MICROgram(s) Inhaler 2 Puff(s) Inhalation two times a day  cefTRIAXone   IVPB 1000 milliGRAM(s) IV Intermittent every 24 hours  cholecalciferol 1000 Unit(s) Oral daily  enoxaparin Injectable 40 milliGRAM(s) SubCutaneous every 24 hours  flecainide 100 milliGRAM(s) Oral every 12 hours  metoprolol tartrate 25 milliGRAM(s) Oral every 12 hours  mirtazapine 7.5 milliGRAM(s) Oral daily  modafinil 100 milliGRAM(s) Oral daily  pantoprazole    Tablet 40 milliGRAM(s) Oral before breakfast  tamsulosin 0.4 milliGRAM(s) Oral at bedtime    MEDICATIONS  (PRN):    acetaminophen     Tablet .. 650 milliGRAM(s) Oral every 6 hours PRN Temp greater or equal to 38C (100.4F), Mild Pain (1 - 3)  ALPRAZolam 0.25 milliGRAM(s) Oral every 12 hours PRN anxiety  aluminum hydroxide/magnesium hydroxide/simethicone Suspension 30 milliLiter(s) Oral every 4 hours PRN Dyspepsia  hydrALAZINE Injectable 10 milliGRAM(s) IV Push every 6 hours PRN SBP > 180  melatonin 3 milliGRAM(s) Oral at bedtime PRN Insomnia  ondansetron Injectable 4 milliGRAM(s) IV Push every 6 hours PRN Nausea and/or Vomiting    Allergies    No Known Allergies    REVIEW OF SYSTEMS:    CONSTITUTIONAL: No fever  EYES: No eye pain,   ENMT:  No sinus or throat pain  NECK: No pain or stiffness  RESPIRATORY: No cough, No hemoptysis; No shortness of breath  CARDIOVASCULAR: No acute chest pain, palpitations,  or leg swelling  GASTROINTESTINAL: No abdominal pain. No nausea, vomiting,   GENITOURINARY: No  hematuria, or incontinence  MUSCULOSKELETAL: No joint swelling; No extremity pain  SKIN: No itching, rashes, or lesions   LYMPH NODES: No enlarged glands  NEUROLOGICAL: No headaches, memory loss,   PSYCHIATRIC: No depression, anxiety, mood swings, or difficulty sleeping  ENDOCRINE: No heat or cold intolerance;   HEME/LYMPH: No easy bruising, or bleeding gums  Allergy/Immunology. No medication allergy. No seasonal allergies.    PHYSICAL EXAM:    Vital Signs Last 24 Hrs    T(C): 37.1 (01-20-23 @ 05:35), Max: 37.3 (01-19-23 @ 12:00)  T(F): 98.8 (01-20-23 @ 05:35), Max: 99.1 (01-19-23 @ 12:00)  HR: 68 (01-20-23 @ 05:35) (63 - 70)  BP: 148/79 (01-20-23 @ 05:35) (114/68 - 165/71)  BP(mean): --  RR: 18 (01-20-23 @ 05:35) (17 - 20)  SpO2: 92% (01-20-23 @ 05:35) (91% - 98%)    Parameters below as of 19 Jan 2023 17:28  Patient On (Oxygen Delivery Method): nasal cannula  O2 Flow (L/min): 2    GENERAL: NAD, well-groomed, well-developed  HEAD:  Atraumatic, Normocephalic  EYES: EOMI, PERRLA, conjunctiva and sclera clear  NECK: Supple, No JVD, thyroid non-palpable    On Neurological Examination:    Mental Status - Pt is alert, awake, oriented X3. Higher functions are intact. Follows commands well and able to answer questions appropriately.    Speech -  Normal. Pt has no aphasia.    Cranial Nerves - Pupils 3 mm equal and reactive to light, extraocular eye movements intact. Pt has no visual field deficit.  No facial asymmetry. Tongue - is in midline.    Motor Exam - 4/5 all over, No drift. No shaking or tremors.    Sensory Exam -  Pt withdraws all extremities equally on stimulation.     Gait - Will get it tested with PT/RW to prevent fall.    Deep tendon Reflexes - 2 plus all over.    Coordination - No asymmetry      Neck Supple -  Yes.    LABS:             CBC Full  -  ( 20 Jan 2023 06:30 )  WBC Count : 10.38 K/uL  RBC Count : 4.43 M/uL  Hemoglobin : 13.0 g/dL  Hematocrit : 39.9 %  Platelet Count - Automated : 271 K/uL  Mean Cell Volume : 90.1 fl  Mean Cell Hemoglobin : 29.3 pg  Mean Cell Hemoglobin Concentration : 32.6 gm/dL    CBC Full  -  ( 19 Jan 2023 08:17 )  WBC Count : 12.79 K/uL  RBC Count : 4.57 M/uL  Hemoglobin : 13.4 g/dL  Hematocrit : 41.3 %  Platelet Count - Automated : 243 K/uL  Mean Cell Volume : 90.4 fl  Mean Cell Hemoglobin : 29.3 pg  Mean Cell Hemoglobin Concentration : 32.4 gm/dL    01-19    143  |  107  |  33<H>  ----------------------------<  106<H>  3.4<L>   |  30  |  1.33<H>    Ca    9.2      19 Jan 2023 08:17  Mg     2.2     01-18    TPro  6.9  /  Alb  2.6<L>  /  TBili  0.8  /  DBili  x   /  AST  16  /  ALT  <10<L>  /  AlkPhos  60  01-18    RADIOLOGY & ADDITIONAL STUDIES:    < from: CT Head No Cont (01.17.23 @ 14:44) >    No hydrocephalus, acute intracranial hemorrhage, mass effect, or brain edema.    Small air-fluid level in left sphenoid sinus, correlate for the presence of acute sinusitis.    < end of copied text >    < from: CT Renal Stone Hunt (01.17.23 @ 15:44) >    IMPRESSION: Chronic left UPJ obstruction with moderate hydronephrosis is unchanged. No renal calculus.    < end of copied text >    < from: US Transthoracic Echocardiogram w/Doppler Complete (01.18.23 @ 09:07) >    IMPRESSION:    1. Mild left ventricular hypertrophy with normal left ventricular systolic function with stage I diastolic dysfunction  2. aortic sclerosis, mild aortic regurgitation    < end of copied text >    
Patient is a 82y old  Male who presents with a chief complaint of AMS, fever, high BP (18 Jan 2023 07:54)    HPI: This is an 81 y/o M with PMH HTN, a fib, HLD, SVT, BPH CVA presents to ED from the [facility] for AMS, HTN and fever. Per EMS suspected UTI as her has been urinating more frequently. Pt states he feels "off". Denies HA, chest pain, SOB, nausea vomiting. States "they told me my BP was high". As per RN pt c.o R knee pain. Pt states he fell a few days ago. Found to be febrile 101 rectally in ED. Daughter reports that patient's sleep-wake cycle has been off the past couple of weeks -- he's been sleeping more during the day and is up at night. She reports that he's missed several meals in the [facility] dining room. He usually walks independently with a walker. She saw him on 1/14/23 and he seemed to be in his usual state of health. Currently in ER patient is back to his baseline -- he is A & O x 4. Daughter reports that patient was recently taken off his BP meds because his BP was running low. He has a known h/o L UPJ obstruction -- he had seen a urologist and told that no further intervention was needed at that time.    (17 Jan 2023 22:12)  No facial asymmetry  No lateralized weakness.  No LOC.  No shaking or seizures.    Interval History -     No distress.    MEDICATIONS  (STANDING):    aspirin enteric coated 81 milliGRAM(s) Oral daily  atorvastatin 20 milliGRAM(s) Oral at bedtime  budesonide 160 MICROgram(s)/formoterol 4.5 MICROgram(s) Inhaler 2 Puff(s) Inhalation two times a day  cefTRIAXone   IVPB 1000 milliGRAM(s) IV Intermittent every 24 hours  cholecalciferol 1000 Unit(s) Oral daily  enoxaparin Injectable 40 milliGRAM(s) SubCutaneous every 24 hours  flecainide 100 milliGRAM(s) Oral every 12 hours  metoprolol tartrate 25 milliGRAM(s) Oral every 12 hours  mirtazapine 7.5 milliGRAM(s) Oral daily  modafinil 100 milliGRAM(s) Oral daily  pantoprazole    Tablet 40 milliGRAM(s) Oral before breakfast  tamsulosin 0.4 milliGRAM(s) Oral at bedtime    MEDICATIONS  (PRN):    acetaminophen     Tablet .. 650 milliGRAM(s) Oral every 6 hours PRN Temp greater or equal to 38C (100.4F), Mild Pain (1 - 3)  ALPRAZolam 0.25 milliGRAM(s) Oral every 12 hours PRN anxiety  aluminum hydroxide/magnesium hydroxide/simethicone Suspension 30 milliLiter(s) Oral every 4 hours PRN Dyspepsia  hydrALAZINE Injectable 10 milliGRAM(s) IV Push every 6 hours PRN SBP > 180  melatonin 3 milliGRAM(s) Oral at bedtime PRN Insomnia  ondansetron Injectable 4 milliGRAM(s) IV Push every 6 hours PRN Nausea and/or Vomiting    Allergies    No Known Allergies    REVIEW OF SYSTEMS:    CONSTITUTIONAL: No fever  EYES: No eye pain,   ENMT:  No sinus or throat pain  NECK: No pain or stiffness  RESPIRATORY: No cough, No hemoptysis; No shortness of breath  CARDIOVASCULAR: No acute chest pain, palpitations,  or leg swelling  GASTROINTESTINAL: No abdominal pain. No nausea, vomiting,   GENITOURINARY: No  hematuria, or incontinence  MUSCULOSKELETAL: No joint swelling; No extremity pain  SKIN: No itching, rashes, or lesions   LYMPH NODES: No enlarged glands  NEUROLOGICAL: No headaches, memory loss,   PSYCHIATRIC: No depression, anxiety, mood swings, or difficulty sleeping  ENDOCRINE: No heat or cold intolerance;   HEME/LYMPH: No easy bruising, or bleeding gums  Allergy/Immunology. No medication allergy. No seasonal allergies.    PHYSICAL EXAM:    Vital Signs Last 24 Hrs    T(C): 36.8 (19 Jan 2023 17:28), Max: 37.4 (18 Jan 2023 21:52)  T(F): 98.3 (19 Jan 2023 17:28), Max: 99.3 (18 Jan 2023 21:52)  HR: 70 (19 Jan 2023 17:28) (60 - 79)  BP: 165/71 (19 Jan 2023 17:28) (118/78 - 165/71)  RR: 20 (19 Jan 2023 17:28) (16 - 20)  SpO2: 95% (19 Jan 2023 17:28) (94% - 98%)    Parameters below as of 19 Jan 2023 17:28  Patient On (Oxygen Delivery Method): nasal cannula  O2 Flow (L/min): 2    GENERAL: NAD, well-groomed, well-developed  HEAD:  Atraumatic, Normocephalic  EYES: EOMI, PERRLA, conjunctiva and sclera clear  NECK: Supple, No JVD, thyroid non-palpable    On Neurological Examination:    Mental Status - Pt is alert, awake, oriented X3. Higher functions are intact. Follows commands well and able to answer questions appropriately.    Speech -  Normal. Pt has no aphasia.    Cranial Nerves - Pupils 3 mm equal and reactive to light, extraocular eye movements intact. Pt has no visual field deficit.  No facial asymmetry. Tongue - is in midline.    Motor Exam - 4/5 all over, No drift. No shaking or tremors.    Sensory Exam -  Pt withdraws all extremities equally on stimulation.     Gait - Will get it tested with PT/RW to prevent fall.    Deep tendon Reflexes - 2 plus all over.    Coordination - No asymmetry      Neck Supple -  Yes.    LABS:             CBC Full  -  ( 19 Jan 2023 08:17 )  WBC Count : 12.79 K/uL  RBC Count : 4.57 M/uL  Hemoglobin : 13.4 g/dL  Hematocrit : 41.3 %  Platelet Count - Automated : 243 K/uL  Mean Cell Volume : 90.4 fl  Mean Cell Hemoglobin : 29.3 pg  Mean Cell Hemoglobin Concentration : 32.4 gm/dL    01-19    143  |  107  |  33<H>  ----------------------------<  106<H>  3.4<L>   |  30  |  1.33<H>    Ca    9.2      19 Jan 2023 08:17  Mg     2.2     01-18    TPro  6.9  /  Alb  2.6<L>  /  TBili  0.8  /  DBili  x   /  AST  16  /  ALT  <10<L>  /  AlkPhos  60  01-18      RADIOLOGY & ADDITIONAL STUDIES:    < from: CT Head No Cont (01.17.23 @ 14:44) >    No hydrocephalus, acute intracranial hemorrhage, mass effect, or brain edema.    Small air-fluid level in left sphenoid sinus, correlate for the presence of acute sinusitis.    < end of copied text >    < from: CT Renal Stone Hunt (01.17.23 @ 15:44) >    IMPRESSION: Chronic left UPJ obstruction with moderate hydronephrosis is unchanged. No renal calculus.    < end of copied text >    < from: US Transthoracic Echocardiogram w/Doppler Complete (01.18.23 @ 09:07) >    IMPRESSION:    1. Mild left ventricular hypertrophy with normal left ventricular systolic function with stage I diastolic dysfunction  2. aortic sclerosis, mild aortic regurgitation    < end of copied text >    
INTERVAL HPI/OVERNIGHT EVENTS:   Patient seen and examined in ED OBS 8.  Opens eyes to verbal stimuli, does not answer questions.    MEDICATIONS  (STANDING):  aspirin enteric coated 81 milliGRAM(s) Oral daily  atorvastatin 20 milliGRAM(s) Oral at bedtime  budesonide 160 MICROgram(s)/formoterol 4.5 MICROgram(s) Inhaler 2 Puff(s) Inhalation two times a day  cholecalciferol 1000 Unit(s) Oral daily  enoxaparin Injectable 40 milliGRAM(s) SubCutaneous every 24 hours  flecainide 100 milliGRAM(s) Oral every 12 hours  metoprolol tartrate 25 milliGRAM(s) Oral every 12 hours  mirtazapine 7.5 milliGRAM(s) Oral daily  modafinil 100 milliGRAM(s) Oral daily  pantoprazole    Tablet 40 milliGRAM(s) Oral before breakfast  tamsulosin 0.4 milliGRAM(s) Oral at bedtime    MEDICATIONS  (PRN):  acetaminophen     Tablet .. 650 milliGRAM(s) Oral every 6 hours PRN Temp greater or equal to 38C (100.4F), Mild Pain (1 - 3)  ALPRAZolam 0.25 milliGRAM(s) Oral every 12 hours PRN anxiety  aluminum hydroxide/magnesium hydroxide/simethicone Suspension 30 milliLiter(s) Oral every 4 hours PRN Dyspepsia  hydrALAZINE Injectable 10 milliGRAM(s) IV Push every 6 hours PRN SBP > 180  melatonin 3 milliGRAM(s) Oral at bedtime PRN Insomnia  ondansetron Injectable 4 milliGRAM(s) IV Push every 6 hours PRN Nausea and/or Vomiting    REVIEW OF SYSTEMS:  could not obtain due to altered mentation    PHYSICAL EXAM:  Vital Signs Last 24 Hrs  T(C): 37.3 (18 Jan 2023 07:45), Max: 38.4 (17 Jan 2023 13:00)  T(F): 99.2 (18 Jan 2023 07:45), Max: 101.1 (17 Jan 2023 13:00)  HR: 79 (18 Jan 2023 07:45) (74 - 90)  BP: 163/72 (18 Jan 2023 07:45) (137/60 - 206/100)  BP(mean): --  RR: 20 (18 Jan 2023 07:45) (17 - 26)  SpO2: 92% (18 Jan 2023 07:45) (92% - 99%)    Parameters below as of 18 Jan 2023 07:45  Patient On (Oxygen Delivery Method): room air    GENERAL: NAD, well-groomed, well-developed, opens eyes to verbal stimuli  EYES:  conjunctiva and sclera clear  NECK: Supple, No JVD, No Cervical LAD   NERVOUS SYSTEM:    No facial droop, does not follow commands  CHEST WALL: No masses  CHEST/LUNG:  No rales, rhonchi, wheezing, or rubs  HEART: No murmurs, rubs, or gallops  ABDOMEN: Soft, Nontender, Nondistended, Bowel sounds present, No palpable masses or organomegaly, No bruits  EXTREMITIES:  2+ Peripheral Pulses, No clubbing, cyanosis, or edema, no calf tenderness in either leg    Diagnostic Testing:             labs pending for today

## 2023-01-26 NOTE — PROGRESS NOTE ADULT - PROVIDER SPECIALTY LIST ADULT
Cardiology
Hospitalist
Cardiology
Infectious Disease
Internal Medicine
Neurology
Neurology
Cardiology
Infectious Disease
Infectious Disease
Internal Medicine
Neurology
Hospitalist
Internal Medicine
Internal Medicine
Hospitalist

## 2023-03-03 ENCOUNTER — INPATIENT (INPATIENT)
Facility: HOSPITAL | Age: 83
LOS: 12 days | Discharge: NURSING, REHAB OR LONG-TERM CARE | DRG: 481 | End: 2023-03-16
Attending: INTERNAL MEDICINE | Admitting: INTERNAL MEDICINE
Payer: MEDICARE

## 2023-03-03 ENCOUNTER — TRANSCRIPTION ENCOUNTER (OUTPATIENT)
Age: 83
End: 2023-03-03

## 2023-03-03 VITALS
DIASTOLIC BLOOD PRESSURE: 72 MMHG | TEMPERATURE: 98 F | OXYGEN SATURATION: 96 % | HEART RATE: 61 BPM | WEIGHT: 199.96 LBS | SYSTOLIC BLOOD PRESSURE: 158 MMHG | RESPIRATION RATE: 16 BRPM | HEIGHT: 68 IN

## 2023-03-03 DIAGNOSIS — S72.001A FRACTURE OF UNSPECIFIED PART OF NECK OF RIGHT FEMUR, INITIAL ENCOUNTER FOR CLOSED FRACTURE: ICD-10-CM

## 2023-03-03 LAB
ALBUMIN SERPL ELPH-MCNC: 3.2 G/DL — LOW (ref 3.3–5)
ALP SERPL-CCNC: 79 U/L — SIGNIFICANT CHANGE UP (ref 30–120)
ALT FLD-CCNC: 21 U/L DA — SIGNIFICANT CHANGE UP (ref 10–60)
ANION GAP SERPL CALC-SCNC: 10 MMOL/L — SIGNIFICANT CHANGE UP (ref 5–17)
ANION GAP SERPL CALC-SCNC: 8 MMOL/L — SIGNIFICANT CHANGE UP (ref 5–17)
APTT BLD: 30.9 SEC — SIGNIFICANT CHANGE UP (ref 27.5–35.5)
AST SERPL-CCNC: 18 U/L — SIGNIFICANT CHANGE UP (ref 10–40)
BASOPHILS # BLD AUTO: 0.08 K/UL — SIGNIFICANT CHANGE UP (ref 0–0.2)
BASOPHILS NFR BLD AUTO: 0.6 % — SIGNIFICANT CHANGE UP (ref 0–2)
BILIRUB SERPL-MCNC: 0.4 MG/DL — SIGNIFICANT CHANGE UP (ref 0.2–1.2)
BLD GP AB SCN SERPL QL: SIGNIFICANT CHANGE UP
BUN SERPL-MCNC: 24 MG/DL — HIGH (ref 7–23)
BUN SERPL-MCNC: 25 MG/DL — HIGH (ref 7–23)
CALCIUM SERPL-MCNC: 8.5 MG/DL — SIGNIFICANT CHANGE UP (ref 8.4–10.5)
CALCIUM SERPL-MCNC: 9 MG/DL — SIGNIFICANT CHANGE UP (ref 8.4–10.5)
CHLORIDE SERPL-SCNC: 101 MMOL/L — SIGNIFICANT CHANGE UP (ref 96–108)
CHLORIDE SERPL-SCNC: 104 MMOL/L — SIGNIFICANT CHANGE UP (ref 96–108)
CO2 SERPL-SCNC: 28 MMOL/L — SIGNIFICANT CHANGE UP (ref 22–31)
CO2 SERPL-SCNC: 31 MMOL/L — SIGNIFICANT CHANGE UP (ref 22–31)
CREAT SERPL-MCNC: 1.12 MG/DL — SIGNIFICANT CHANGE UP (ref 0.5–1.3)
CREAT SERPL-MCNC: 1.33 MG/DL — HIGH (ref 0.5–1.3)
EGFR: 53 ML/MIN/1.73M2 — LOW
EGFR: 65 ML/MIN/1.73M2 — SIGNIFICANT CHANGE UP
EOSINOPHIL # BLD AUTO: 0.35 K/UL — SIGNIFICANT CHANGE UP (ref 0–0.5)
EOSINOPHIL NFR BLD AUTO: 2.5 % — SIGNIFICANT CHANGE UP (ref 0–6)
GLUCOSE SERPL-MCNC: 125 MG/DL — HIGH (ref 70–99)
GLUCOSE SERPL-MCNC: 140 MG/DL — HIGH (ref 70–99)
HCT VFR BLD CALC: 41.7 % — SIGNIFICANT CHANGE UP (ref 39–50)
HGB BLD-MCNC: 14.1 G/DL — SIGNIFICANT CHANGE UP (ref 13–17)
IMM GRANULOCYTES NFR BLD AUTO: 1.2 % — HIGH (ref 0–0.9)
INR BLD: 1.34 RATIO — HIGH (ref 0.88–1.16)
LYMPHOCYTES # BLD AUTO: 1.76 K/UL — SIGNIFICANT CHANGE UP (ref 1–3.3)
LYMPHOCYTES # BLD AUTO: 12.7 % — LOW (ref 13–44)
MAGNESIUM SERPL-MCNC: 1.4 MG/DL — LOW (ref 1.6–2.6)
MCHC RBC-ENTMCNC: 29.4 PG — SIGNIFICANT CHANGE UP (ref 27–34)
MCHC RBC-ENTMCNC: 33.8 GM/DL — SIGNIFICANT CHANGE UP (ref 32–36)
MCV RBC AUTO: 87.1 FL — SIGNIFICANT CHANGE UP (ref 80–100)
MONOCYTES # BLD AUTO: 0.81 K/UL — SIGNIFICANT CHANGE UP (ref 0–0.9)
MONOCYTES NFR BLD AUTO: 5.8 % — SIGNIFICANT CHANGE UP (ref 2–14)
NEUTROPHILS # BLD AUTO: 10.73 K/UL — HIGH (ref 1.8–7.4)
NEUTROPHILS NFR BLD AUTO: 77.2 % — HIGH (ref 43–77)
NRBC # BLD: 0 /100 WBCS — SIGNIFICANT CHANGE UP (ref 0–0)
PLATELET # BLD AUTO: 287 K/UL — SIGNIFICANT CHANGE UP (ref 150–400)
POTASSIUM SERPL-MCNC: 2.7 MMOL/L — CRITICAL LOW (ref 3.5–5.3)
POTASSIUM SERPL-MCNC: 3 MMOL/L — LOW (ref 3.5–5.3)
POTASSIUM SERPL-SCNC: 2.7 MMOL/L — CRITICAL LOW (ref 3.5–5.3)
POTASSIUM SERPL-SCNC: 3 MMOL/L — LOW (ref 3.5–5.3)
PROT SERPL-MCNC: 6.8 G/DL — SIGNIFICANT CHANGE UP (ref 6–8.3)
PROTHROM AB SERPL-ACNC: 15.5 SEC — HIGH (ref 10.5–13.4)
RBC # BLD: 4.79 M/UL — SIGNIFICANT CHANGE UP (ref 4.2–5.8)
RBC # FLD: 13.7 % — SIGNIFICANT CHANGE UP (ref 10.3–14.5)
SARS-COV-2 RNA SPEC QL NAA+PROBE: SIGNIFICANT CHANGE UP
SODIUM SERPL-SCNC: 140 MMOL/L — SIGNIFICANT CHANGE UP (ref 135–145)
SODIUM SERPL-SCNC: 142 MMOL/L — SIGNIFICANT CHANGE UP (ref 135–145)
WBC # BLD: 13.89 K/UL — HIGH (ref 3.8–10.5)
WBC # FLD AUTO: 13.89 K/UL — HIGH (ref 3.8–10.5)

## 2023-03-03 PROCEDURE — 70450 CT HEAD/BRAIN W/O DYE: CPT | Mod: 26,MA

## 2023-03-03 PROCEDURE — 71045 X-RAY EXAM CHEST 1 VIEW: CPT | Mod: 26

## 2023-03-03 PROCEDURE — 99285 EMERGENCY DEPT VISIT HI MDM: CPT | Mod: FS

## 2023-03-03 PROCEDURE — 99223 1ST HOSP IP/OBS HIGH 75: CPT

## 2023-03-03 PROCEDURE — 93010 ELECTROCARDIOGRAM REPORT: CPT

## 2023-03-03 PROCEDURE — 73502 X-RAY EXAM HIP UNI 2-3 VIEWS: CPT | Mod: 26,RT

## 2023-03-03 PROCEDURE — 72125 CT NECK SPINE W/O DYE: CPT | Mod: 26,MA

## 2023-03-03 RX ORDER — ATORVASTATIN CALCIUM 80 MG/1
20 TABLET, FILM COATED ORAL AT BEDTIME
Refills: 0 | Status: DISCONTINUED | OUTPATIENT
Start: 2023-03-03 | End: 2023-03-04

## 2023-03-03 RX ORDER — ACETAMINOPHEN 500 MG
650 TABLET ORAL EVERY 6 HOURS
Refills: 0 | Status: DISCONTINUED | OUTPATIENT
Start: 2023-03-03 | End: 2023-03-04

## 2023-03-03 RX ORDER — MORPHINE SULFATE 50 MG/1
2 CAPSULE, EXTENDED RELEASE ORAL ONCE
Refills: 0 | Status: DISCONTINUED | OUTPATIENT
Start: 2023-03-03 | End: 2023-03-03

## 2023-03-03 RX ORDER — TRANEXAMIC ACID 100 MG/ML
1000 INJECTION, SOLUTION INTRAVENOUS ONCE
Refills: 0 | Status: COMPLETED | OUTPATIENT
Start: 2023-03-04 | End: 2023-03-04

## 2023-03-03 RX ORDER — ACETAMINOPHEN 500 MG
975 TABLET ORAL ONCE
Refills: 0 | Status: COMPLETED | OUTPATIENT
Start: 2023-03-03 | End: 2023-03-03

## 2023-03-03 RX ORDER — OXYCODONE HYDROCHLORIDE 5 MG/1
10 TABLET ORAL EVERY 4 HOURS
Refills: 0 | Status: DISCONTINUED | OUTPATIENT
Start: 2023-03-03 | End: 2023-03-04

## 2023-03-03 RX ORDER — POTASSIUM CHLORIDE 20 MEQ
40 PACKET (EA) ORAL ONCE
Refills: 0 | Status: COMPLETED | OUTPATIENT
Start: 2023-03-03 | End: 2023-03-03

## 2023-03-03 RX ORDER — POTASSIUM CHLORIDE 20 MEQ
10 PACKET (EA) ORAL
Refills: 0 | Status: COMPLETED | OUTPATIENT
Start: 2023-03-03 | End: 2023-03-03

## 2023-03-03 RX ORDER — CHLORHEXIDINE GLUCONATE 213 G/1000ML
1 SOLUTION TOPICAL ONCE
Refills: 0 | Status: COMPLETED | OUTPATIENT
Start: 2023-03-04 | End: 2023-03-04

## 2023-03-03 RX ORDER — MAGNESIUM SULFATE 500 MG/ML
2 VIAL (ML) INJECTION ONCE
Refills: 0 | Status: COMPLETED | OUTPATIENT
Start: 2023-03-03 | End: 2023-03-03

## 2023-03-03 RX ORDER — OXYCODONE HYDROCHLORIDE 5 MG/1
5 TABLET ORAL EVERY 4 HOURS
Refills: 0 | Status: DISCONTINUED | OUTPATIENT
Start: 2023-03-03 | End: 2023-03-04

## 2023-03-03 RX ORDER — MORPHINE SULFATE 50 MG/1
2 CAPSULE, EXTENDED RELEASE ORAL EVERY 4 HOURS
Refills: 0 | Status: DISCONTINUED | OUTPATIENT
Start: 2023-03-03 | End: 2023-03-04

## 2023-03-03 RX ORDER — DIAZEPAM 5 MG
5 TABLET ORAL ONCE
Refills: 0 | Status: DISCONTINUED | OUTPATIENT
Start: 2023-03-03 | End: 2023-03-03

## 2023-03-03 RX ORDER — TAMSULOSIN HYDROCHLORIDE 0.4 MG/1
0.4 CAPSULE ORAL AT BEDTIME
Refills: 0 | Status: DISCONTINUED | OUTPATIENT
Start: 2023-03-03 | End: 2023-03-04

## 2023-03-03 RX ORDER — PANTOPRAZOLE SODIUM 20 MG/1
40 TABLET, DELAYED RELEASE ORAL
Refills: 0 | Status: DISCONTINUED | OUTPATIENT
Start: 2023-03-03 | End: 2023-03-04

## 2023-03-03 RX ORDER — POTASSIUM CHLORIDE 20 MEQ
40 PACKET (EA) ORAL ONCE
Refills: 0 | Status: COMPLETED | OUTPATIENT
Start: 2023-03-03 | End: 2023-03-04

## 2023-03-03 RX ORDER — FLECAINIDE ACETATE 50 MG
100 TABLET ORAL EVERY 12 HOURS
Refills: 0 | Status: DISCONTINUED | OUTPATIENT
Start: 2023-03-03 | End: 2023-03-04

## 2023-03-03 RX ORDER — ALPRAZOLAM 0.25 MG
0.25 TABLET ORAL EVERY 12 HOURS
Refills: 0 | Status: DISCONTINUED | OUTPATIENT
Start: 2023-03-03 | End: 2023-03-03

## 2023-03-03 RX ORDER — METOPROLOL TARTRATE 50 MG
25 TABLET ORAL EVERY 12 HOURS
Refills: 0 | Status: DISCONTINUED | OUTPATIENT
Start: 2023-03-03 | End: 2023-03-16

## 2023-03-03 RX ADMIN — Medication 40 MILLIEQUIVALENT(S): at 22:59

## 2023-03-03 RX ADMIN — Medication 40 MILLIEQUIVALENT(S): at 15:40

## 2023-03-03 RX ADMIN — Medication 100 MILLIEQUIVALENT(S): at 16:46

## 2023-03-03 RX ADMIN — OXYCODONE HYDROCHLORIDE 10 MILLIGRAM(S): 5 TABLET ORAL at 17:21

## 2023-03-03 RX ADMIN — Medication 975 MILLIGRAM(S): at 16:17

## 2023-03-03 RX ADMIN — Medication 25 GRAM(S): at 23:21

## 2023-03-03 RX ADMIN — Medication 25 MILLIGRAM(S): at 17:21

## 2023-03-03 RX ADMIN — MORPHINE SULFATE 2 MILLIGRAM(S): 50 CAPSULE, EXTENDED RELEASE ORAL at 16:18

## 2023-03-03 RX ADMIN — Medication 5 MILLIGRAM(S): at 18:51

## 2023-03-03 RX ADMIN — OXYCODONE HYDROCHLORIDE 10 MILLIGRAM(S): 5 TABLET ORAL at 17:36

## 2023-03-03 RX ADMIN — ATORVASTATIN CALCIUM 20 MILLIGRAM(S): 80 TABLET, FILM COATED ORAL at 21:47

## 2023-03-03 RX ADMIN — Medication 975 MILLIGRAM(S): at 15:48

## 2023-03-03 RX ADMIN — Medication 100 MILLIEQUIVALENT(S): at 18:10

## 2023-03-03 RX ADMIN — TAMSULOSIN HYDROCHLORIDE 0.4 MILLIGRAM(S): 0.4 CAPSULE ORAL at 21:47

## 2023-03-03 RX ADMIN — MORPHINE SULFATE 2 MILLIGRAM(S): 50 CAPSULE, EXTENDED RELEASE ORAL at 16:45

## 2023-03-03 RX ADMIN — Medication 100 MILLIEQUIVALENT(S): at 15:41

## 2023-03-03 NOTE — H&P ADULT - HISTORY OF PRESENT ILLNESS
82M Atrial Fibrillation, HTN, HLD, Anxiety and Depression brought in by EMS after having a mechanical fall at his assisted living facility. Patient reports no palpitations, dizziness, light headedness or LOC during this event. Evaluated in the ED and found to have closed fracture of the right femur.  Patient currently received pain medication and resting comfortable.  Of note patient was admitted to our service at this hospital last month for fever and hypertension. Adjustments to his BP medications were made and treated with IV Antibiotics at the time.

## 2023-03-03 NOTE — H&P ADULT - ASSESSMENT
82M Atrial Fibrillation, HTN, HLD, Anxiety and Depression admitted for Right Femur Fracture    Right Femur Fracture  Continue Bowel and pain control regimen;    Incentive Spirometer for lung expansion;   CT C-spine with severe degenerative changes in C2; Anesthesia made aware  Will need to repletion of Potassium to >4.0    Atrial Fibrillation / HTN / HLD   Currently Sinus and has underlying RBBB; Has history of SVT  Felcainide 100mg BID + Metoprolol 25mg BID + Statin  No AC and suspect patient is fall risk given the multiple rib fractures   Keep K >4.0 and Mag >2.0   Cardiology Consulted (Dr. Corrales)     Asthma  Albuterol PRN      Anxiety / Depression  Xanax BID     Diet  Regular  NPO After Midnight     DVT Prophylaxis   SCD    Disposition  Patient is scheduled for surgery in AM tomorrow   Once K is repleted to >4.0 and Mag >2.0 patient is medically optimized for surgery

## 2023-03-03 NOTE — ED ADULT NURSE NOTE - OBJECTIVE STATEMENT
pt from the [facility] with c/o right hip pain after a trip and fall caused by loosing balance. denies dizziness, denies LOC. denies neck pain. c/o right hip pain with movement.

## 2023-03-03 NOTE — ED PROVIDER NOTE - PROGRESS NOTE DETAILS
Patient stable.  Patient continues to decline any pain medication.  Potassium 2.7.  IV potassium and oral potassium ordered.  Continues to decline any pain medication in emergency room.  Spoke with Dr. Vazquez, hopes to perform surgery tomorrow pending medical clearance.  With Dr. Palencia, kindly accepts patient for admission

## 2023-03-03 NOTE — H&P ADULT - NSHPPHYSICALEXAM_GEN_ALL_CORE
Left arm; PHYSICAL EXAM:  Vital Signs Last 24 Hrs  T(C): 36.7 (03 Mar 2023 13:45), Max: 36.7 (03 Mar 2023 13:45)  T(F): 98 (03 Mar 2023 13:45), Max: 98 (03 Mar 2023 13:45)  HR: 61 (03 Mar 2023 13:45) (61 - 61)  BP: 158/72 (03 Mar 2023 13:45) (158/72 - 158/72)  BP(mean): --  RR: 16 (03 Mar 2023 13:45) (16 - 16)  SpO2: 96% (03 Mar 2023 13:45) (96% - 96%)    Parameters below as of 03 Mar 2023 13:45  Patient On (Oxygen Delivery Method): room air        GENERAL: NAD  HEAD:  Atraumatic, Normocephalic  ENMT: Osman Mucous Membranes  NECK: Supple, No JVD, Normal thyroid  HEART: Regular rate and rhythm; No murmurs, rubs, or gallops  CHEST/LUNG: Clear to auscultation bilaterally; No rales, rhonchi, wheezing, or rubs  ABDOMEN: Soft, Nontender, Nondistended; Bowel sounds present  SKIN: No rashes or lesions

## 2023-03-03 NOTE — ED PROVIDER NOTE - DIFFERENTIAL DIAGNOSIS
Patient reports with hip pain after mechanical fall.  Differentials include but not limited to fracture, dislocation, sprain, strain Differential Diagnosis

## 2023-03-03 NOTE — ED ADULT TRIAGE NOTE - CHIEF COMPLAINT QUOTE
" I was reaching for something - then I fell down on my right side. I have pain on my right hip " Pt BIBA from the [facility] for right hip associated with a fall incident No LOC No head injury

## 2023-03-03 NOTE — ED ADULT NURSE REASSESSMENT NOTE - NS ED NURSE REASSESS COMMENT FT1
Patient resting comfortably in bed and in NAD. Currently sleeping but easily arousable. Pain appears to have improved at this time. No grimacing noted, appears comfortable. KCl infusion completed. Will repeat labs. Patient admitted. Awaiting bed placement.

## 2023-03-03 NOTE — ED ADULT NURSE REASSESSMENT NOTE - INV PAIN INTERVENTIONS-NUMBER SCALE
multiple medication modalities/unnecessary movement avoided/positioned to decrease pressure/relaxation

## 2023-03-03 NOTE — ED PROVIDER NOTE - CLINICAL SUMMARY MEDICAL DECISION MAKING FREE TEXT BOX
82-year-old male with history of hypertension, A-fib, hyperlipidemia, SVT, BPH, CVA presents with brought into the ER by EMS from nursing facility for right hip pain.  Patient states he was walking, lost his balance and fell to his right hip.  Patient hit his head, no LOC.  No acute headache/nausea/vomiting.  No chest pain or shortness of breath.  Patient complains of right hip pain cannot ambulate now.  No other acute injury or complaints.  No known COVID exposures.  Patient previously vaccinated for COVID.  No other acute injury or complaints.  Exam: Nontoxic, well-appearing.  No external signs of head trauma.  No spinal tenderness.  No chest wall tenderness.  No signs of truncal trauma.  Full range of motion bilateral upper extremities, full range of motion left hip.  Positive tenderness to right hip with decreased range of motion.  Patient's right leg is shortened and externally rotated.  Normal distal strength and sensation equal bilaterally.  2+ pulses.  Normal cap refill.  No other acute signs of trauma.  Acute right hip injury/probable fracture status post mechanical fall.  Patient also with a minor head injury.  Will check labs, x-ray, CT head, orthopedic consultation, admission

## 2023-03-03 NOTE — ED ADULT NURSE NOTE - NSIMPLEMENTINTERV_GEN_ALL_ED
Implemented All Fall with Harm Risk Interventions:  [place] to call system. Call bell, personal items and telephone within reach. Instruct patient to call for assistance. Room bathroom lighting operational. Non-slip footwear when patient is off stretcher. Physically safe environment: no spills, clutter or unnecessary equipment. Stretcher in lowest position, wheels locked, appropriate side rails in place. Provide visual cue, wrist band, yellow gown, etc. Monitor gait and stability. Monitor for mental status changes and reorient to person, place, and time. Review medications for side effects contributing to fall risk. Reinforce activity limits and safety measures with patient and family. Provide visual clues: red socks.

## 2023-03-03 NOTE — H&P ADULT - NSHPLABSRESULTS_GEN_ALL_CORE
Labs:                          14.1   13.89 )-----------( [phone]      ( 03 Mar 2023 14:01 )             41.7       03-03    140  |  101  |  25<H>  ----------------------------<  125<H>  2.7<LL>   |  31  |  1.33<H>    Ca    9.0      03 Mar 2023 14:01    TPro  6.8  /  Alb  3.2<L>  /  TBili  0.4  /  DBili  x   /  AST  18  /  ALT  21  /  AlkPhos  79  03-03                  PT/INR - ( 03 Mar 2023 14:01 )   PT: 15.5 sec;   INR: 1.34 ratio         PTT - ( 03 Mar 2023 14:01 )  PTT:30.9 sec    Lactate Trend            CAPILLARY BLOOD GLUCOSE          EKG:   Personally Reviewed:  [ ] YES     Imaging:   Personally Reviewed:  [ ] YES

## 2023-03-03 NOTE — ED PROVIDER NOTE - OBJECTIVE STATEMENT
82-year-old male with history of hypertension, TIA, atrial fibrillation, depression, anxiety, hyperlipidemia, GERD, BPH, history of SVT brought in by ambulance for right hip pain and deformity after trip and fall at assisted living prior to arrival.  Patient states he was reaching for something, slipped, and fell.  States he hit the front of his head.  No LOC.  Takes baby aspirin daily but denies other blood thinners.  Denies headache, dizziness, confusion, or memory loss.  No neck or back pain.  Patient does not remember hitting his hip but is now unable to move his hip at this time due to pain with movement.  Positive deformity to right hip.  No pain when remaining still.  Denies other injuries or complaints  Resident at [facility]'s assisted living  PCP Alex Kruger

## 2023-03-03 NOTE — PATIENT PROFILE ADULT - FALL HARM RISK - HARM RISK INTERVENTIONS

## 2023-03-04 ENCOUNTER — TRANSCRIPTION ENCOUNTER (OUTPATIENT)
Age: 83
End: 2023-03-04

## 2023-03-04 LAB
ANION GAP SERPL CALC-SCNC: 6 MMOL/L — SIGNIFICANT CHANGE UP (ref 5–17)
ANION GAP SERPL CALC-SCNC: 8 MMOL/L — SIGNIFICANT CHANGE UP (ref 5–17)
BUN SERPL-MCNC: 23 MG/DL — SIGNIFICANT CHANGE UP (ref 7–23)
BUN SERPL-MCNC: 24 MG/DL — HIGH (ref 7–23)
CALCIUM SERPL-MCNC: 8.4 MG/DL — SIGNIFICANT CHANGE UP (ref 8.4–10.5)
CALCIUM SERPL-MCNC: 9 MG/DL — SIGNIFICANT CHANGE UP (ref 8.4–10.5)
CHLORIDE SERPL-SCNC: 104 MMOL/L — SIGNIFICANT CHANGE UP (ref 96–108)
CHLORIDE SERPL-SCNC: 105 MMOL/L — SIGNIFICANT CHANGE UP (ref 96–108)
CO2 SERPL-SCNC: 29 MMOL/L — SIGNIFICANT CHANGE UP (ref 22–31)
CO2 SERPL-SCNC: 32 MMOL/L — HIGH (ref 22–31)
CREAT SERPL-MCNC: 1.06 MG/DL — SIGNIFICANT CHANGE UP (ref 0.5–1.3)
CREAT SERPL-MCNC: 1.07 MG/DL — SIGNIFICANT CHANGE UP (ref 0.5–1.3)
EGFR: 69 ML/MIN/1.73M2 — SIGNIFICANT CHANGE UP
EGFR: 70 ML/MIN/1.73M2 — SIGNIFICANT CHANGE UP
GLUCOSE SERPL-MCNC: 109 MG/DL — HIGH (ref 70–99)
GLUCOSE SERPL-MCNC: 172 MG/DL — HIGH (ref 70–99)
HCT VFR BLD CALC: 33.9 % — LOW (ref 39–50)
HCT VFR BLD CALC: 38 % — LOW (ref 39–50)
HGB BLD-MCNC: 11.3 G/DL — LOW (ref 13–17)
HGB BLD-MCNC: 12.5 G/DL — LOW (ref 13–17)
MAGNESIUM SERPL-MCNC: 1.9 MG/DL — SIGNIFICANT CHANGE UP (ref 1.6–2.6)
MCHC RBC-ENTMCNC: 29.3 PG — SIGNIFICANT CHANGE UP (ref 27–34)
MCHC RBC-ENTMCNC: 29.5 PG — SIGNIFICANT CHANGE UP (ref 27–34)
MCHC RBC-ENTMCNC: 32.9 GM/DL — SIGNIFICANT CHANGE UP (ref 32–36)
MCHC RBC-ENTMCNC: 33.3 GM/DL — SIGNIFICANT CHANGE UP (ref 32–36)
MCV RBC AUTO: 88.5 FL — SIGNIFICANT CHANGE UP (ref 80–100)
MCV RBC AUTO: 89.2 FL — SIGNIFICANT CHANGE UP (ref 80–100)
MRSA PCR RESULT.: SIGNIFICANT CHANGE UP
NRBC # BLD: 0 /100 WBCS — SIGNIFICANT CHANGE UP (ref 0–0)
NRBC # BLD: 0 /100 WBCS — SIGNIFICANT CHANGE UP (ref 0–0)
PLATELET # BLD AUTO: 259 K/UL — SIGNIFICANT CHANGE UP (ref 150–400)
PLATELET # BLD AUTO: 284 K/UL — SIGNIFICANT CHANGE UP (ref 150–400)
POTASSIUM SERPL-MCNC: 3.5 MMOL/L — SIGNIFICANT CHANGE UP (ref 3.5–5.3)
POTASSIUM SERPL-MCNC: 3.7 MMOL/L — SIGNIFICANT CHANGE UP (ref 3.5–5.3)
POTASSIUM SERPL-SCNC: 3.5 MMOL/L — SIGNIFICANT CHANGE UP (ref 3.5–5.3)
POTASSIUM SERPL-SCNC: 3.7 MMOL/L — SIGNIFICANT CHANGE UP (ref 3.5–5.3)
RBC # BLD: 3.83 M/UL — LOW (ref 4.2–5.8)
RBC # BLD: 4.26 M/UL — SIGNIFICANT CHANGE UP (ref 4.2–5.8)
RBC # FLD: 14 % — SIGNIFICANT CHANGE UP (ref 10.3–14.5)
RBC # FLD: 14.1 % — SIGNIFICANT CHANGE UP (ref 10.3–14.5)
S AUREUS DNA NOSE QL NAA+PROBE: SIGNIFICANT CHANGE UP
SODIUM SERPL-SCNC: 142 MMOL/L — SIGNIFICANT CHANGE UP (ref 135–145)
SODIUM SERPL-SCNC: 142 MMOL/L — SIGNIFICANT CHANGE UP (ref 135–145)
WBC # BLD: 10.52 K/UL — HIGH (ref 3.8–10.5)
WBC # BLD: 13.11 K/UL — HIGH (ref 3.8–10.5)
WBC # FLD AUTO: 10.52 K/UL — HIGH (ref 3.8–10.5)
WBC # FLD AUTO: 13.11 K/UL — HIGH (ref 3.8–10.5)

## 2023-03-04 PROCEDURE — 99232 SBSQ HOSP IP/OBS MODERATE 35: CPT

## 2023-03-04 PROCEDURE — 27245 TREAT THIGH FRACTURE: CPT | Mod: AS,RT

## 2023-03-04 DEVICE — SCREW LAG GAMMA 10.5X105MM: Type: IMPLANTABLE DEVICE | Site: RIGHT | Status: FUNCTIONAL

## 2023-03-04 DEVICE — STRYKER TROCHANTERIC NAIL 10MM X 170MM 125 DEGREE: Type: IMPLANTABLE DEVICE | Site: RIGHT | Status: FUNCTIONAL

## 2023-03-04 DEVICE — K-WIRE STRYKER 3.2M X 450MM: Type: IMPLANTABLE DEVICE | Site: RIGHT | Status: FUNCTIONAL

## 2023-03-04 DEVICE — SCREW LOCK FULLY THREADED 5X37.5MM: Type: IMPLANTABLE DEVICE | Site: RIGHT | Status: FUNCTIONAL

## 2023-03-04 RX ORDER — POLYETHYLENE GLYCOL 3350 17 G/17G
17 POWDER, FOR SOLUTION ORAL AT BEDTIME
Refills: 0 | Status: DISCONTINUED | OUTPATIENT
Start: 2023-03-04 | End: 2023-03-16

## 2023-03-04 RX ORDER — MAGNESIUM HYDROXIDE 400 MG/1
30 TABLET, CHEWABLE ORAL DAILY
Refills: 0 | Status: DISCONTINUED | OUTPATIENT
Start: 2023-03-04 | End: 2023-03-16

## 2023-03-04 RX ORDER — MIRTAZAPINE 45 MG/1
7.5 TABLET, ORALLY DISINTEGRATING ORAL DAILY
Refills: 0 | Status: DISCONTINUED | OUTPATIENT
Start: 2023-03-04 | End: 2023-03-16

## 2023-03-04 RX ORDER — HYDROMORPHONE HYDROCHLORIDE 2 MG/ML
0.5 INJECTION INTRAMUSCULAR; INTRAVENOUS; SUBCUTANEOUS
Refills: 0 | Status: DISCONTINUED | OUTPATIENT
Start: 2023-03-04 | End: 2023-03-04

## 2023-03-04 RX ORDER — CEFAZOLIN SODIUM 1 G
2000 VIAL (EA) INJECTION EVERY 8 HOURS
Refills: 0 | Status: COMPLETED | OUTPATIENT
Start: 2023-03-04 | End: 2023-03-05

## 2023-03-04 RX ORDER — OXYCODONE HYDROCHLORIDE 5 MG/1
2.5 TABLET ORAL
Refills: 0 | Status: DISCONTINUED | OUTPATIENT
Start: 2023-03-04 | End: 2023-03-08

## 2023-03-04 RX ORDER — HYDROMORPHONE HYDROCHLORIDE 2 MG/ML
1 INJECTION INTRAMUSCULAR; INTRAVENOUS; SUBCUTANEOUS
Refills: 0 | Status: DISCONTINUED | OUTPATIENT
Start: 2023-03-04 | End: 2023-03-04

## 2023-03-04 RX ORDER — ENOXAPARIN SODIUM 100 MG/ML
40 INJECTION SUBCUTANEOUS EVERY 24 HOURS
Refills: 0 | Status: DISCONTINUED | OUTPATIENT
Start: 2023-03-05 | End: 2023-03-16

## 2023-03-04 RX ORDER — ALPRAZOLAM 0.25 MG
0.25 TABLET ORAL EVERY 12 HOURS
Refills: 0 | Status: DISCONTINUED | OUTPATIENT
Start: 2023-03-04 | End: 2023-03-04

## 2023-03-04 RX ORDER — ONDANSETRON 8 MG/1
4 TABLET, FILM COATED ORAL EVERY 6 HOURS
Refills: 0 | Status: DISCONTINUED | OUTPATIENT
Start: 2023-03-04 | End: 2023-03-16

## 2023-03-04 RX ORDER — SODIUM CHLORIDE 9 MG/ML
1000 INJECTION, SOLUTION INTRAVENOUS
Refills: 0 | Status: DISCONTINUED | OUTPATIENT
Start: 2023-03-04 | End: 2023-03-04

## 2023-03-04 RX ORDER — ACETAMINOPHEN 500 MG
1000 TABLET ORAL EVERY 6 HOURS
Refills: 0 | Status: COMPLETED | OUTPATIENT
Start: 2023-03-04 | End: 2023-03-05

## 2023-03-04 RX ORDER — ONDANSETRON 8 MG/1
4 TABLET, FILM COATED ORAL ONCE
Refills: 0 | Status: DISCONTINUED | OUTPATIENT
Start: 2023-03-04 | End: 2023-03-04

## 2023-03-04 RX ORDER — APREPITANT 80 MG/1
40 CAPSULE ORAL ONCE
Refills: 0 | Status: DISCONTINUED | OUTPATIENT
Start: 2023-03-04 | End: 2023-03-04

## 2023-03-04 RX ORDER — ASPIRIN/CALCIUM CARB/MAGNESIUM 324 MG
81 TABLET ORAL DAILY
Refills: 0 | Status: DISCONTINUED | OUTPATIENT
Start: 2023-03-04 | End: 2023-03-16

## 2023-03-04 RX ORDER — HYDROMORPHONE HYDROCHLORIDE 2 MG/ML
0.25 INJECTION INTRAMUSCULAR; INTRAVENOUS; SUBCUTANEOUS
Refills: 0 | Status: DISCONTINUED | OUTPATIENT
Start: 2023-03-04 | End: 2023-03-04

## 2023-03-04 RX ORDER — ACETAMINOPHEN 500 MG
1000 TABLET ORAL EVERY 8 HOURS
Refills: 0 | Status: DISCONTINUED | OUTPATIENT
Start: 2023-03-05 | End: 2023-03-16

## 2023-03-04 RX ORDER — TAMSULOSIN HYDROCHLORIDE 0.4 MG/1
0.4 CAPSULE ORAL AT BEDTIME
Refills: 0 | Status: DISCONTINUED | OUTPATIENT
Start: 2023-03-04 | End: 2023-03-16

## 2023-03-04 RX ORDER — ATORVASTATIN CALCIUM 80 MG/1
20 TABLET, FILM COATED ORAL AT BEDTIME
Refills: 0 | Status: DISCONTINUED | OUTPATIENT
Start: 2023-03-04 | End: 2023-03-16

## 2023-03-04 RX ORDER — MODAFINIL 200 MG/1
100 TABLET ORAL DAILY
Refills: 0 | Status: DISCONTINUED | OUTPATIENT
Start: 2023-03-04 | End: 2023-03-10

## 2023-03-04 RX ORDER — PANTOPRAZOLE SODIUM 20 MG/1
40 TABLET, DELAYED RELEASE ORAL
Refills: 0 | Status: DISCONTINUED | OUTPATIENT
Start: 2023-03-04 | End: 2023-03-16

## 2023-03-04 RX ORDER — FLECAINIDE ACETATE 50 MG
100 TABLET ORAL EVERY 12 HOURS
Refills: 0 | Status: DISCONTINUED | OUTPATIENT
Start: 2023-03-04 | End: 2023-03-06

## 2023-03-04 RX ORDER — OXYCODONE HYDROCHLORIDE 5 MG/1
5 TABLET ORAL ONCE
Refills: 0 | Status: DISCONTINUED | OUTPATIENT
Start: 2023-03-04 | End: 2023-03-04

## 2023-03-04 RX ORDER — CEFAZOLIN SODIUM 1 G
2000 VIAL (EA) INJECTION ONCE
Refills: 0 | Status: DISCONTINUED | OUTPATIENT
Start: 2023-03-04 | End: 2023-03-04

## 2023-03-04 RX ORDER — SODIUM CHLORIDE 9 MG/ML
1000 INJECTION, SOLUTION INTRAVENOUS
Refills: 0 | Status: DISCONTINUED | OUTPATIENT
Start: 2023-03-05 | End: 2023-03-16

## 2023-03-04 RX ORDER — OXYCODONE HYDROCHLORIDE 5 MG/1
5 TABLET ORAL
Refills: 0 | Status: DISCONTINUED | OUTPATIENT
Start: 2023-03-04 | End: 2023-03-08

## 2023-03-04 RX ORDER — SENNA PLUS 8.6 MG/1
2 TABLET ORAL AT BEDTIME
Refills: 0 | Status: DISCONTINUED | OUTPATIENT
Start: 2023-03-04 | End: 2023-03-16

## 2023-03-04 RX ADMIN — ATORVASTATIN CALCIUM 20 MILLIGRAM(S): 80 TABLET, FILM COATED ORAL at 21:26

## 2023-03-04 RX ADMIN — Medication 100 MILLIGRAM(S): at 17:42

## 2023-03-04 RX ADMIN — SENNA PLUS 2 TABLET(S): 8.6 TABLET ORAL at 21:26

## 2023-03-04 RX ADMIN — Medication 25 MILLIGRAM(S): at 06:45

## 2023-03-04 RX ADMIN — Medication 40 MILLIEQUIVALENT(S): at 01:20

## 2023-03-04 RX ADMIN — SODIUM CHLORIDE 75 MILLILITER(S): 9 INJECTION, SOLUTION INTRAVENOUS at 19:41

## 2023-03-04 RX ADMIN — OXYCODONE HYDROCHLORIDE 10 MILLIGRAM(S): 5 TABLET ORAL at 07:32

## 2023-03-04 RX ADMIN — Medication 400 MILLIGRAM(S): at 18:58

## 2023-03-04 RX ADMIN — SODIUM CHLORIDE 75 MILLILITER(S): 9 INJECTION, SOLUTION INTRAVENOUS at 15:46

## 2023-03-04 RX ADMIN — OXYCODONE HYDROCHLORIDE 10 MILLIGRAM(S): 5 TABLET ORAL at 08:02

## 2023-03-04 RX ADMIN — Medication 25 MILLIGRAM(S): at 17:42

## 2023-03-04 RX ADMIN — TAMSULOSIN HYDROCHLORIDE 0.4 MILLIGRAM(S): 0.4 CAPSULE ORAL at 21:26

## 2023-03-04 RX ADMIN — Medication 100 MILLIGRAM(S): at 19:41

## 2023-03-04 RX ADMIN — Medication 100 MILLIGRAM(S): at 07:24

## 2023-03-04 RX ADMIN — Medication 30 MILLILITER(S): at 18:58

## 2023-03-04 NOTE — BRIEF OPERATIVE NOTE - COMMENTS
Decubutis ulcer noted right buttock silverlon applied  Wound care nurse called Candida Traore   wound noted in OR most likely from assisted living facility

## 2023-03-04 NOTE — PROGRESS NOTE ADULT - ASSESSMENT
82M Atrial Fibrillation, HTN, HLD, Anxiety and Depression admitted for Right Femur Fracture    Right Femur Fracture  Continue Bowel and pain control regimen;    Incentive Spirometer for lung expansion;   CT C-spine with severe degenerative changes in C2; Anesthesia made aware  can proceed to OR with moderate risk     Atrial Fibrillation / HTN / HLD   Currently Sinus and has underlying RBBB; Has history of SVT  Felcainide 100mg BID + Metoprolol 25mg BID + Statin  No AC and suspect patient is fall risk given the multiple rib fractures       Asthma  Albuterol PRN      Anxiety / Depression  Xanax BID     Diet  Regular  NPO After Midnight     DVT Prophylaxis   SCD    Disposition  can proceed with modeate risk

## 2023-03-04 NOTE — PROGRESS NOTE ADULT - SUBJECTIVE AND OBJECTIVE BOX
patient in Operating room noted to have bandage over right buttock  underneath bandage approximate 1 x2 mm size unknown depth with packing   Dr. Sanchez removed packing an applied silverlon  no redness no erythema , foul smelling with discharge.  Hospitalist to evaluate   wound care nurse called 3/4/22 message left   Wound consult for wound care nurse  Hospitalist also to evaluate inhaler therapy

## 2023-03-04 NOTE — PROGRESS NOTE ADULT - SUBJECTIVE AND OBJECTIVE BOX
Patient is a 82y old  Male who presents with a chief complaint of Hip Fracture (03 Mar 2023 15:37)        HPI:  82M Atrial Fibrillation, HTN, HLD, Anxiety and Depression brought in by EMS after having a mechanical fall at his assisted living facility. Patient reports no palpitations, dizziness, light headedness or LOC during this event. Evaluated in the ED and found to have closed fracture of the right femur.  Patient currently received pain medication and resting comfortable.  Of note patient was admitted to our service at this hospital last month for fever and hypertension. Adjustments to his BP medications were made and treated with IV Antibiotics at the time.  (03 Mar 2023 15:37)      SUBJECTIVE & OBJECTIVE: Pt seen and examined at bedside. pain in th ehip     PHYSICAL EXAM:  T(C): 36.8 (03-04-23 @ 05:10), Max: 37 (03-03-23 @ 21:16)  HR: 60 (03-04-23 @ 05:10) (60 - 63)  BP: 137/81 (03-04-23 @ 05:10) (112/62 - 158/72)  RR: 17 (03-04-23 @ 05:10) (16 - 20)  SpO2: 94% (03-04-23 @ 05:10) (92% - 97%)  Wt(kg): -- Height (cm): 172.7 (03-03 @ 13:45)  Weight (kg): 90.7 (03-03 @ 13:45)  BMI (kg/m2): 30.4 (03-03 @ 13:45)  BSA (m2): 2.04 (03-03 @ 13:45)  GENERAL: NAD, well-groomed, well-developed  HEAD:  Atraumatic, Normocephalic  EYES: EOMI, PERRLA, conjunctiva and sclera clear  ENMT: Moist mucous membranes  NECK: Supple, No JVD  NERVOUS SYSTEM:  Alert & Oriented X3, Motor Strength 5/5 B/L upper and lower extremities; DTRs 2+ intact and symmetric  CHEST/LUNG: Clear to auscultation bilaterally; No rales, rhonchi, wheezing, or rubs  HEART: Regular rate and rhythm; No murmurs, rubs, or gallops  ABDOMEN: Soft, Nontender, Nondistended; Bowel sounds present  EXTREMITIES:  2+ Peripheral Pulses, No clubbing, cyanosis, or edema        MEDICATIONS  (STANDING):  atorvastatin 20 milliGRAM(s) Oral at bedtime  flecainide 100 milliGRAM(s) Oral every 12 hours  metoprolol tartrate 25 milliGRAM(s) Oral every 12 hours  pantoprazole    Tablet 40 milliGRAM(s) Oral before breakfast  tamsulosin 0.4 milliGRAM(s) Oral at bedtime    MEDICATIONS  (PRN):  acetaminophen     Tablet .. 650 milliGRAM(s) Oral every 6 hours PRN Temp greater or equal to 38C (100.4F), Mild Pain (1 - 3)  morphine  - Injectable 2 milliGRAM(s) IV Push every 4 hours PRN Breakthrough  oxyCODONE    IR 5 milliGRAM(s) Oral every 4 hours PRN Moderate Pain (4 - 6)  oxyCODONE    IR 10 milliGRAM(s) Oral every 4 hours PRN Severe Pain (7 - 10)      LABS:                        12.5   10.52 )-----------( 284      ( 04 Mar 2023 08:32 )             38.0     03-04    142  |  104  |  23  ----------------------------<  109<H>  3.5   |  32<H>  |  1.06    Ca    9.0      04 Mar 2023 08:32  Mg     1.9     03-04    TPro  6.8  /  Alb  3.2<L>  /  TBili  0.4  /  DBili  x   /  AST  18  /  ALT  21  /  AlkPhos  79  03-03    PT/INR - ( 03 Mar 2023 14:01 )   PT: 15.5 sec;   INR: 1.34 ratio         PTT - ( 03 Mar 2023 14:01 )  PTT:30.9 sec    Magnesium, Serum: 1.9 mg/dL (03-04 @ 08:32)  Magnesium, Serum: 1.4 mg/dL (03-03 @ 20:22)    CAPILLARY BLOOD GLUCOSE          CAPILLARY BLOOD GLUCOSE        CAPILLARY BLOOD GLUCOSE                RECENT CULTURES:      RADIOLOGY & ADDITIONAL TESTS:                        DVT/GI ppx  Discussed with pt @ bedside

## 2023-03-04 NOTE — BRIEF OPERATIVE NOTE - NSICDXBRIEFPROCEDURE_GEN_ALL_CORE_FT
PROCEDURES:  Open reduction and internal fixation (ORIF) of fracture of right hip using intramedullary raffi 04-Mar-2023 10:56:15  Delmar Nuñez

## 2023-03-05 ENCOUNTER — TRANSCRIPTION ENCOUNTER (OUTPATIENT)
Age: 83
End: 2023-03-05

## 2023-03-05 LAB
ALBUMIN SERPL ELPH-MCNC: 2.5 G/DL — LOW (ref 3.3–5)
ALP SERPL-CCNC: 68 U/L — SIGNIFICANT CHANGE UP (ref 30–120)
ALT FLD-CCNC: 11 U/L DA — SIGNIFICANT CHANGE UP (ref 10–60)
ANION GAP SERPL CALC-SCNC: 9 MMOL/L — SIGNIFICANT CHANGE UP (ref 5–17)
AST SERPL-CCNC: 17 U/L — SIGNIFICANT CHANGE UP (ref 10–40)
BILIRUB SERPL-MCNC: 0.4 MG/DL — SIGNIFICANT CHANGE UP (ref 0.2–1.2)
BUN SERPL-MCNC: 23 MG/DL — SIGNIFICANT CHANGE UP (ref 7–23)
CALCIUM SERPL-MCNC: 8.5 MG/DL — SIGNIFICANT CHANGE UP (ref 8.4–10.5)
CHLORIDE SERPL-SCNC: 105 MMOL/L — SIGNIFICANT CHANGE UP (ref 96–108)
CO2 SERPL-SCNC: 26 MMOL/L — SIGNIFICANT CHANGE UP (ref 22–31)
CREAT SERPL-MCNC: 1.01 MG/DL — SIGNIFICANT CHANGE UP (ref 0.5–1.3)
EGFR: 74 ML/MIN/1.73M2 — SIGNIFICANT CHANGE UP
GLUCOSE SERPL-MCNC: 107 MG/DL — HIGH (ref 70–99)
HCT VFR BLD CALC: 35 % — LOW (ref 39–50)
HGB BLD-MCNC: 11.3 G/DL — LOW (ref 13–17)
MCHC RBC-ENTMCNC: 29.2 PG — SIGNIFICANT CHANGE UP (ref 27–34)
MCHC RBC-ENTMCNC: 32.3 GM/DL — SIGNIFICANT CHANGE UP (ref 32–36)
MCV RBC AUTO: 90.4 FL — SIGNIFICANT CHANGE UP (ref 80–100)
NRBC # BLD: 0 /100 WBCS — SIGNIFICANT CHANGE UP (ref 0–0)
PLATELET # BLD AUTO: 236 K/UL — SIGNIFICANT CHANGE UP (ref 150–400)
POTASSIUM SERPL-MCNC: 3.9 MMOL/L — SIGNIFICANT CHANGE UP (ref 3.5–5.3)
POTASSIUM SERPL-SCNC: 3.9 MMOL/L — SIGNIFICANT CHANGE UP (ref 3.5–5.3)
PROT SERPL-MCNC: 6 G/DL — SIGNIFICANT CHANGE UP (ref 6–8.3)
RBC # BLD: 3.87 M/UL — LOW (ref 4.2–5.8)
RBC # FLD: 13.8 % — SIGNIFICANT CHANGE UP (ref 10.3–14.5)
SODIUM SERPL-SCNC: 140 MMOL/L — SIGNIFICANT CHANGE UP (ref 135–145)
WBC # BLD: 12.12 K/UL — HIGH (ref 3.8–10.5)
WBC # FLD AUTO: 12.12 K/UL — HIGH (ref 3.8–10.5)

## 2023-03-05 PROCEDURE — 93010 ELECTROCARDIOGRAM REPORT: CPT

## 2023-03-05 PROCEDURE — 99232 SBSQ HOSP IP/OBS MODERATE 35: CPT

## 2023-03-05 RX ORDER — CELECOXIB 200 MG/1
200 CAPSULE ORAL EVERY 12 HOURS
Refills: 0 | Status: DISCONTINUED | OUTPATIENT
Start: 2023-03-05 | End: 2023-03-06

## 2023-03-05 RX ORDER — ACETAMINOPHEN 500 MG
2 TABLET ORAL
Qty: 0 | Refills: 0 | DISCHARGE

## 2023-03-05 RX ORDER — ENOXAPARIN SODIUM 100 MG/ML
40 INJECTION SUBCUTANEOUS
Qty: 0 | Refills: 0 | DISCHARGE
Start: 2023-03-05

## 2023-03-05 RX ORDER — ACETAMINOPHEN 500 MG
2 TABLET ORAL
Qty: 0 | Refills: 0 | DISCHARGE
Start: 2023-03-05

## 2023-03-05 RX ADMIN — Medication 100 MILLIGRAM(S): at 17:25

## 2023-03-05 RX ADMIN — OXYCODONE HYDROCHLORIDE 5 MILLIGRAM(S): 5 TABLET ORAL at 06:22

## 2023-03-05 RX ADMIN — Medication 100 MILLIGRAM(S): at 05:50

## 2023-03-05 RX ADMIN — ENOXAPARIN SODIUM 40 MILLIGRAM(S): 100 INJECTION SUBCUTANEOUS at 09:15

## 2023-03-05 RX ADMIN — OXYCODONE HYDROCHLORIDE 5 MILLIGRAM(S): 5 TABLET ORAL at 09:52

## 2023-03-05 RX ADMIN — ATORVASTATIN CALCIUM 20 MILLIGRAM(S): 80 TABLET, FILM COATED ORAL at 20:00

## 2023-03-05 RX ADMIN — Medication 1000 MILLIGRAM(S): at 05:47

## 2023-03-05 RX ADMIN — PANTOPRAZOLE SODIUM 40 MILLIGRAM(S): 20 TABLET, DELAYED RELEASE ORAL at 05:48

## 2023-03-05 RX ADMIN — CELECOXIB 200 MILLIGRAM(S): 200 CAPSULE ORAL at 17:25

## 2023-03-05 RX ADMIN — OXYCODONE HYDROCHLORIDE 5 MILLIGRAM(S): 5 TABLET ORAL at 15:20

## 2023-03-05 RX ADMIN — Medication 100 MILLIGRAM(S): at 03:31

## 2023-03-05 RX ADMIN — OXYCODONE HYDROCHLORIDE 5 MILLIGRAM(S): 5 TABLET ORAL at 06:52

## 2023-03-05 RX ADMIN — OXYCODONE HYDROCHLORIDE 5 MILLIGRAM(S): 5 TABLET ORAL at 14:23

## 2023-03-05 RX ADMIN — Medication 25 MILLIGRAM(S): at 17:25

## 2023-03-05 RX ADMIN — MIRTAZAPINE 7.5 MILLIGRAM(S): 45 TABLET, ORALLY DISINTEGRATING ORAL at 12:38

## 2023-03-05 RX ADMIN — SODIUM CHLORIDE 100 MILLILITER(S): 9 INJECTION, SOLUTION INTRAVENOUS at 00:49

## 2023-03-05 RX ADMIN — OXYCODONE HYDROCHLORIDE 5 MILLIGRAM(S): 5 TABLET ORAL at 20:27

## 2023-03-05 RX ADMIN — Medication 81 MILLIGRAM(S): at 12:38

## 2023-03-05 RX ADMIN — OXYCODONE HYDROCHLORIDE 5 MILLIGRAM(S): 5 TABLET ORAL at 10:50

## 2023-03-05 RX ADMIN — Medication 1000 MILLIGRAM(S): at 15:20

## 2023-03-05 RX ADMIN — OXYCODONE HYDROCHLORIDE 5 MILLIGRAM(S): 5 TABLET ORAL at 19:57

## 2023-03-05 RX ADMIN — MODAFINIL 100 MILLIGRAM(S): 200 TABLET ORAL at 12:38

## 2023-03-05 RX ADMIN — Medication 1000 MILLIGRAM(S): at 01:00

## 2023-03-05 RX ADMIN — Medication 400 MILLIGRAM(S): at 00:30

## 2023-03-05 RX ADMIN — TAMSULOSIN HYDROCHLORIDE 0.4 MILLIGRAM(S): 0.4 CAPSULE ORAL at 19:59

## 2023-03-05 RX ADMIN — CELECOXIB 200 MILLIGRAM(S): 200 CAPSULE ORAL at 18:20

## 2023-03-05 RX ADMIN — Medication 1000 MILLIGRAM(S): at 06:22

## 2023-03-05 RX ADMIN — Medication 25 MILLIGRAM(S): at 09:16

## 2023-03-05 RX ADMIN — SENNA PLUS 2 TABLET(S): 8.6 TABLET ORAL at 19:56

## 2023-03-05 RX ADMIN — Medication 1000 MILLIGRAM(S): at 14:24

## 2023-03-05 NOTE — PHYSICAL THERAPY INITIAL EVALUATION ADULT - PHYSICAL ASSIST/NONPHYSICAL ASSIST: SIT/SUPINE, REHAB EVAL
Venipuncture performed on right arm and specimen obtained . Patient tolerated the procedure without complications or complaints.
2 person assist

## 2023-03-05 NOTE — PROGRESS NOTE ADULT - SUBJECTIVE AND OBJECTIVE BOX
Procedure: ORIF Right Hip Fx with gamma nail  POD#: 1    S: Pt without complaints. No SOB,CP, N/V. Tolerated Fluids / Diet well.   Pain comfortable on Oxy 2.5 & 5mg + Tylenol + Celebrex. No BM yet [+BM], + flatus, No abdominal pain.  acetaminophen     Tablet .. 1000 milliGRAM(s) Oral every 8 hours  ALPRAZolam 0.25 milliGRAM(s) Oral every 12 hours PRN  HYDROmorphone  Injectable 0.25 milliGRAM(s) IV Push every 3 hours PRN  mirtazapine 7.5 milliGRAM(s) Oral daily  modafinil 100 milliGRAM(s) Oral daily  ondansetron Injectable 4 milliGRAM(s) IV Push every 6 hours PRN  oxyCODONE    IR 2.5 milliGRAM(s) Oral every 3 hours PRN  oxyCODONE    IR 5 milliGRAM(s) Oral every 3 hours PRN    O: General: Pt Alert and oriented, On exam NAD,   VS: Vital Signs Last 24 Hrs  T(C): 36.7 (05 Mar 2023 09:19), Max: 37.1 (04 Mar 2023 13:39)  T(F): 98 (05 Mar 2023 09:19), Max: 98.7 (04 Mar 2023 13:39)  HR: 64 (05 Mar 2023 09:19) (62 - 665)  BP: 124/71 (05 Mar 2023 09:19) (94/70 - 160/53)  BP(mean): --  RR: 16 (05 Mar 2023 09:19) (14 - 20)  SpO2: 91% (05 Mar 2023 09:19) (91% - 98%)    Parameters below as of 05 Mar 2023 09:19  Patient On (Oxygen Delivery Method): room air    heart: RRR no murmur  Lungs: BS clear bilat.  Abd: Soft; no distention, benign exam    right hip silverlon clean and dry.  Mild tenderness to thigh, though it is soft.  Neurologic: Has sensation bilat. feet & toes ;  Full AROM bilat feet & toes. EHL / AT  = Bilat: 5/5   Vascular: Feet toes warm, pink. PT = 2+. Calves soft ; w/o tenderness bilat..  VTEP: On Bilat. Venodynes + aspirin enteric coated 81 milliGRAM(s) Oral daily  enoxaparin Injectable 40 milliGRAM(s) SubCutaneous every 24 hours      Activity in PT today: walked 45' x 2                          11.3   12.12 )-----------( 236      ( 05 Mar 2023 06:00 )             35.0     03-05    140  |  105  |  23  ----------------------------<  107<H>  3.9   |  26  |  1.01    Ca    8.5      05 Mar 2023 06:00  Mg     1.9     03-04    TPro  6.0  /  Alb  2.5<L>  /  TBili  0.4  /  DBili  x   /  AST  17  /  ALT  11  /  AlkPhos  68  03-05      Hospitalist input noted    Primary Orthopedic Assessment:  • Stable from Orthopedic perspective  • Neuro motor exam stable  • Labs: stable      Plan:   • Continue:  PT/OT/Weightbearing as tolerated with assistance of a walker/Ice to hip/          Incentive spirometry encouraged /   • Continue DVT prophylaxis as prescribed, including use of compression devices and ankle pumps  • Continue Pain Rx  • Plans per Medicine / Cardiology  • Discharge planning – anticipated discharge is:  Subacute rehabilitation  when medically stable & cleared by PT/OT

## 2023-03-05 NOTE — DISCHARGE NOTE PROVIDER - NSDCCPCAREPLAN_GEN_ALL_CORE_FT
PRINCIPAL DISCHARGE DIAGNOSIS  Diagnosis: Hip fracture, right  Assessment and Plan of Treatment: ORIF right hip fracture w/ gamma nail  Ambulate WBAT w/ walker.  Gait, transfer, stairs, ADL training  You have a silverlon dressing. It is water resistant and may get slightly wet in the shower.  Remove dressing in 7 days and leave wound open to air.  Wound may get wet in the shower as long as there is no drainage from wound.  Staples were used for skin closure and are to be removed in 14 days in rehab or in surgeon's office        SECONDARY DISCHARGE DIAGNOSES  Diagnosis: Hypokalemia  Assessment and Plan of Treatment:      PRINCIPAL DISCHARGE DIAGNOSIS  Diagnosis: Hip fracture, right  Assessment and Plan of Treatment: ORIF right hip fracture w/ gamma nail  Ambulate WBAT w/ walker.  Gait, transfer, stairs, ADL training  You have a [place] dressing is over your hip wound.  It is waterproof and you may shower.  Do not aim shower stream at surgical site and pat dry with clean towel after showering.   Remove Silverlon dressing 7 days after surgery and leave wound open to air. dressing. It is water resistant and may get slightly wet in the shower.  Remove dressing in 7 days and leave wound open to air.  Wound may get wet in the shower as long as there is no drainage from wound.  Staples were used for skin closure and are to be removed in 14 days in rehab or in surgeon's office         PRINCIPAL DISCHARGE DIAGNOSIS  Diagnosis: Hip fracture, right  Assessment and Plan of Treatment: ORIF right hip fracture w/ gamma nail-  Ambulate WBAT w/ walker.  Gait, transfer, stairs, ADL training  You have a [place] dressing is over your hip wound.  It is waterproof and you may shower.  Do not aim shower stream at surgical site and pat dry with clean towel after showering.     Silverlon dressing is water resistant and may get slightly wet in the shower. Wound may get wet in the shower as long as there is no drainage from wound. Remove Silverlon dressing on 3/20 and take out staples.

## 2023-03-05 NOTE — DISCHARGE NOTE PROVIDER - CARE PROVIDER_API CALL
Ganesh Sanchez)  Orthopaedic Surgery  [address] [place] [address]  Phone: [phone]  Fax: [phone]  Follow Up Time: 2 weeks   Ganesh Sanchez)  Orthopaedic Surgery  [address] [place] [address]  Phone: [phone]  Fax: [phone]  Follow Up Time: 2 weeks    Alex Kruger)  Internal Medicine  [address] [facility] [address]  [place] [address]  Phone: [phone]  Fax: [phone]  Established Patient  Follow Up Time: Routine

## 2023-03-05 NOTE — PROGRESS NOTE ADULT - ASSESSMENT
83y/o over weight. Seen at [facility] telemetry. Daughter at bedside  Lying flat, comfortably  History HTN, PAF, high cholesterol, RBBB, BPH, CVA  Per prior notes, had normal Stress test recently    Admitted for mechanical fall and fractured right hip  No LOC, palpitations, dizziness, etc  No recent significant cardiac symptoms    3/5/23  Seen at [facility]  Lying flat comfortably, awake  No complaints offered  S/P right hip surgery    Impression  Fractured right hip from mechanical fall  Known PAF (currently in NSR)    Plan:  - Continue Metoprolol tartrate-25mg BID                  Crestor-5mg OD                  ASA-81mg OD  - Due to above EKG changes will hold fecainide  - However, flecainide was re-started?  - Check EKG  - Pain control  - 1/18/23 Echocardiogram with LVH, normal EF, mild AI  - Post-op care

## 2023-03-05 NOTE — PROGRESS NOTE ADULT - SUBJECTIVE AND OBJECTIVE BOX
Patient is a 82y old  Male who presents with a chief complaint of Hip Fracture (05 Mar 2023 09:49)        HPI:  82M Atrial Fibrillation, HTN, HLD, Anxiety and Depression brought in by EMS after having a mechanical fall at his assisted living facility. Patient reports no palpitations, dizziness, light headedness or LOC during this event. Evaluated in the ED and found to have closed fracture of the right femur.  Patient currently received pain medication and resting comfortable.  Of note patient was admitted to our service at this hospital last month for fever and hypertension. Adjustments to his BP medications were made and treated with IV Antibiotics at the time.  (03 Mar 2023 15:37)      SUBJECTIVE & OBJECTIVE: Pt seen and examined at bedside. nad    PHYSICAL EXAM:  T(C): 36.7 (03-05-23 @ 09:19), Max: 37.1 (03-04-23 @ 13:39)  HR: 64 (03-05-23 @ 09:19) (62 - 665)  BP: 124/71 (03-05-23 @ 09:19) (94/70 - 160/53)  RR: 16 (03-05-23 @ 09:19) (14 - 20)  SpO2: 91% (03-05-23 @ 09:19) (91% - 98%)  Wt(kg): --   GENERAL: NAD, well-groomed, well-developed  HEAD:  Atraumatic, Normocephalic  EYES: EOMI, PERRLA, conjunctiva and sclera clear  ENMT: Moist mucous membranes  NECK: Supple, No JVD  NERVOUS SYSTEM:  Alert & Oriented X3, Motor Strength 5/5 B/L upper and lower extremities; DTRs 2+ intact and symmetric  CHEST/LUNG: Clear to auscultation bilaterally; No rales, rhonchi, wheezing, or rubs  HEART: Regular rate and rhythm; No murmurs, rubs, or gallops  ABDOMEN: Soft, Nontender, Nondistended; Bowel sounds present  EXTREMITIES:  2+ Peripheral Pulses, No clubbing, cyanosis, or edema        MEDICATIONS  (STANDING):  acetaminophen     Tablet .. 1000 milliGRAM(s) Oral every 8 hours  aspirin enteric coated 81 milliGRAM(s) Oral daily  atorvastatin 20 milliGRAM(s) Oral at bedtime  chlorhexidine 2% Cloths 1 Application(s) Topical once  enoxaparin Injectable 40 milliGRAM(s) SubCutaneous every 24 hours  flecainide 100 milliGRAM(s) Oral every 12 hours  lactated ringers. 1000 milliLiter(s) (100 mL/Hr) IV Continuous <Continuous>  metoprolol tartrate 25 milliGRAM(s) Oral every 12 hours  mirtazapine 7.5 milliGRAM(s) Oral daily  modafinil 100 milliGRAM(s) Oral daily  pantoprazole    Tablet 40 milliGRAM(s) Oral before breakfast  polyethylene glycol 3350 17 Gram(s) Oral at bedtime  senna 2 Tablet(s) Oral at bedtime  tamsulosin 0.4 milliGRAM(s) Oral at bedtime  tranexamic acid IVPB 1000 milliGRAM(s) IV Intermittent once  tranexamic acid IVPB 1000 milliGRAM(s) IV Intermittent once    MEDICATIONS  (PRN):  ALPRAZolam 0.25 milliGRAM(s) Oral every 12 hours PRN anxiety  aluminum hydroxide/magnesium hydroxide/simethicone Suspension 30 milliLiter(s) Oral every 6 hours PRN Dyspepsia  HYDROmorphone  Injectable 0.25 milliGRAM(s) IV Push every 3 hours PRN breakthrough  magnesium hydroxide Suspension 30 milliLiter(s) Oral daily PRN Constipation  ondansetron Injectable 4 milliGRAM(s) IV Push every 6 hours PRN Nausea and/or Vomiting  oxyCODONE    IR 2.5 milliGRAM(s) Oral every 3 hours PRN Moderate Pain (4 - 6)  oxyCODONE    IR 5 milliGRAM(s) Oral every 3 hours PRN Severe Pain (7 - 10)      LABS:                        11.3   12.12 )-----------( 236      ( 05 Mar 2023 06:00 )             35.0     03-05    140  |  105  |  23  ----------------------------<  107<H>  3.9   |  26  |  1.01    Ca    8.5      05 Mar 2023 06:00  Mg     1.9     03-04    TPro  6.0  /  Alb  2.5<L>  /  TBili  0.4  /  DBili  x   /  AST  17  /  ALT  11  /  AlkPhos  68  03-05    PT/INR - ( 03 Mar 2023 14:01 )   PT: 15.5 sec;   INR: 1.34 ratio         PTT - ( 03 Mar 2023 14:01 )  PTT:30.9 sec      CAPILLARY BLOOD GLUCOSE          CAPILLARY BLOOD GLUCOSE        CAPILLARY BLOOD GLUCOSE                RECENT CULTURES:      RADIOLOGY & ADDITIONAL TESTS:                        DVT/GI ppx  Discussed with pt @ bedside

## 2023-03-05 NOTE — DISCHARGE NOTE PROVIDER - NSDCFUADDAPPT_GEN_ALL_CORE_FT
Call Dr Sanchez's office for follow up appt in 2 weeks.  It is advisable to follow up w/ your pcp in 2-3 weeks to be sure there are no underlying problems.

## 2023-03-05 NOTE — PHYSICAL THERAPY INITIAL EVALUATION ADULT - PERTINENT HX OF CURRENT PROBLEM, REHAB EVAL
82M Atrial Fibrillation, HTN, HLD, Anxiety and Depression brought in by EMS after having a mechanical fall at his assisted living facility. Patient reports no palpitations, dizziness, light headedness or LOC during this event. Evaluated in the ED and found to have closed fracture of the right femur.  Pt. now s/p right hip ORIF with IM raffi.

## 2023-03-05 NOTE — DISCHARGE NOTE PROVIDER - HOSPITAL COURSE
82M Atrial Fibrillation, HTN, HLD, Anxiety and Depression brought in by EMS to [facility] on 3/3/23 after having a mechanical fall at his assisted living facility. Patient reports no palpitations, dizziness, light headedness or LOC during this event. Evaluated in the ED and found to have closed fracture of the right femur.  Patient received pain medication and was comfortable.  Of note patient was admitted to our service at this hospital last month for fever and hypertension. Adjustments to his BP medications were made and treated with IV Antibiotics at the time.  He was then admitted to the floor and the appropriate medical clearances were obtained    On 3/4/23 and after receiving pre-operative parenteral prophylactic antibiotics (ancef), the patient  underwent an  uncomplicated ORIF right hip fracture w/ a gamma nail by orthopedic surgeon Dr. Ganesh Sanchez.    A medical consultation from the Hospitalist service was obtained for post-operative medical co-management. Typical Physical & occupational therapy modalities post ORIF of hip were performed including ambulation training, range of motion, ADL's, and transfers. Lovenox 40 mg daily, along w/ bilat venodynes, was given for DVT prophylaxis.  The patient had a clean appearing surgical incision with no sign of surgical site infections and had a stable neuro / vascular exam of the operated extremity.  After progression of mobility guided by the PT/ OT staff,  the patient was felt to benefit from further rehabilitative care for restoration to level of function. This was felt to best be accomplished at a rehab facility.  Discharge and Orthopedic Care instructions were delineated in the Discharge Plan and reviewed with the patient. All medications were delineated in the medication reconciliation tool and key points were reviewed with the patient. They were deemed stable from an Orthopedic & medical standpoint for discharge today.  Upon  discharge from the rehab facility he will be  following up with Dr. Sanchez for office  follow up Orthopedic care.

## 2023-03-05 NOTE — DISCHARGE NOTE PROVIDER - NSDCCPTREATMENT_GEN_ALL_CORE_FT
PRINCIPAL PROCEDURE  Procedure: Open reduction and internal fixation (ORIF) of fracture of right proximal femur using Gamma nail  Findings and Treatment: right proximal femur fracture

## 2023-03-05 NOTE — DISCHARGE NOTE PROVIDER - NSDCMRMEDTOKEN_GEN_ALL_CORE_FT
acetaminophen 500 mg oral tablet: 2 tab(s) orally every 8 hours  ALPRAZolam 0.25 mg oral tablet: 1 tab(s) orally every 12 hours  aspirin 81 mg oral delayed release tablet: 1 tab(s) orally once a day  Breo Ellipta 100 mcg-25 mcg/inh inhalation powder: 1 puff(s) inhaled once a day  clobetasol 0.05% topical cream: Apply topically to affected area 2 times a day, As Needed  desonide 0.05% topical cream: Apply topically to affected area 3 times a day, As Needed  enoxaparin: 40 milligram(s) subcutaneous once a day for 14 days  flecainide 100 mg oral tablet: 1 tab(s) orally every 12 hours  hydroCHLOROthiazide 25 mg oral tablet: 1 tab(s) orally once a day  levalbuterol 45 mcg/inh inhalation aerosol: 2 puff(s) inhaled every 6 hours  Metoprolol Tartrate 25 mg oral tablet: 1 tab(s) orally every 12 hours  mirtazapine 7.5 mg oral tablet: 1 tab(s) orally once a day  modafinil 100 mg oral tablet: 1 tab(s) orally once a day (in the morning)  omeprazole 20 mg oral delayed release tablet: 1 tab(s) orally once a day  polyethylene glycol 3350 oral powder for reconstitution: 17 gram(s) orally 2 times a day  rosuvastatin 5 mg oral tablet: 1 tab(s) orally once a day  senna leaf extract oral tablet: 2 tab(s) orally once a day (at bedtime)  tamsulosin 0.4 mg oral capsule: 1 cap(s) orally once a day   acetaminophen 500 mg oral tablet: 2 tab(s) orally every 8 hours  ALPRAZolam 0.25 mg oral tablet: 1 tab(s) orally every 12 hours  aspirin 81 mg oral delayed release tablet: 1 tab(s) orally once a day  Breo Ellipta 100 mcg-25 mcg/inh inhalation powder: 1 puff(s) inhaled once a day  celecoxib 100 mg oral capsule: 1 cap(s) orally every 12 hours  clobetasol 0.05% topical cream: Apply topically to affected area 2 times a day, As Needed  desonide 0.05% topical cream: Apply topically to affected area 3 times a day, As Needed  enoxaparin: 40 milligram(s) subcutaneous once a day for 14 days  flecainide 100 mg oral tablet: 1 tab(s) orally every 12 hours  hydroCHLOROthiazide 25 mg oral tablet: 1 tab(s) orally once a day  levalbuterol 45 mcg/inh inhalation aerosol: 2 puff(s) inhaled every 6 hours  Metoprolol Tartrate 25 mg oral tablet: 1 tab(s) orally every 12 hours  mirtazapine 7.5 mg oral tablet: 1 tab(s) orally once a day  modafinil 100 mg oral tablet: 1 tab(s) orally once a day (in the morning)  omeprazole 20 mg oral delayed release tablet: 1 tab(s) orally once a day  oxyCODONE: 2.5 milligram(s) orally every 3 hours as needed for moderate pain  oxyCODONE 5 mg oral tablet: 1 tab(s) orally every 3 hours, As needed, Severe Pain (7 - 10)  polyethylene glycol 3350 oral powder for reconstitution: 17 gram(s) orally 2 times a day  rosuvastatin 5 mg oral tablet: 1 tab(s) orally once a day  senna leaf extract oral tablet: 2 tab(s) orally once a day (at bedtime)  tamsulosin 0.4 mg oral capsule: 1 cap(s) orally once a day   acetaminophen 500 mg oral tablet: 2 tab(s) orally every 8 hours  ALPRAZolam 0.25 mg oral tablet: 1 tab(s) orally every 12 hours  aspirin 81 mg oral delayed release tablet: 1 tab(s) orally once a day  Breo Ellipta 100 mcg-25 mcg/inh inhalation powder: 1 puff(s) inhaled once a day  celecoxib 100 mg oral capsule: 1 cap(s) orally every 12 hours  clobetasol 0.05% topical cream: Apply topically to affected area 2 times a day, As Needed  desonide 0.05% topical cream: Apply topically to affected area 3 times a day, As Needed  enoxaparin: 40 milligram(s) subcutaneous once a day for 14 days  hydroCHLOROthiazide 25 mg oral tablet: 1 tab(s) orally once a day  HYDROmorphone 2 mg oral tablet: 3 tab(s) orally every 4 hours, As needed, Severe Pain (7 - 10)  HYDROmorphone 4 mg oral tablet: 1 tab(s) orally every 4 hours, As needed, Moderate Pain (4 - 6)  levalbuterol 45 mcg/inh inhalation aerosol: 2 puff(s) inhaled every 6 hours  Metoprolol Tartrate 25 mg oral tablet: 1 tab(s) orally every 12 hours  mirtazapine 7.5 mg oral tablet: 1 tab(s) orally once a day  modafinil 100 mg oral tablet: 1 tab(s) orally once a day (in the morning)  omeprazole 20 mg oral delayed release tablet: 1 tab(s) orally once a day  polyethylene glycol 3350 oral powder for reconstitution: 17 gram(s) orally 2 times a day  rosuvastatin 5 mg oral tablet: 1 tab(s) orally once a day  senna leaf extract oral tablet: 2 tab(s) orally once a day (at bedtime)  tamsulosin 0.4 mg oral capsule: 1 cap(s) orally once a day

## 2023-03-05 NOTE — PROGRESS NOTE ADULT - ASSESSMENT
82M Atrial Fibrillation, HTN, HLD, Anxiety and Depression admitted for Right Femur Fracture    Right Femur Fracture  Continue Bowel and pain control regimen;    Incentive Spirometer for lung expansion;   CT C-spine with severe degenerative changes in C2; Anesthesia made aware  s/p Open reduction and internal fixation (ORIF) of fracture of right hip using intramedullary raffi   d/c planning to nakul    Atrial Fibrillation / HTN / HLD   Currently Sinus and has underlying RBBB; Has history of SVT  Felcainide 100mg BID + Metoprolol 25mg BID + Statin  No AC and suspect patient is fall risk given the multiple rib fractures       Asthma  Albuterol PRN      Anxiety / Depression  Xanax BID     Diet  Regular      DVT Prophylaxis   SCD    Disposition  nakul

## 2023-03-05 NOTE — PROGRESS NOTE ADULT - SUBJECTIVE AND OBJECTIVE BOX
Patient is a 82y Male with a known history of :    HPI:  82M Atrial Fibrillation, HTN, HLD, Anxiety and Depression brought in by EMS after having a mechanical fall at his assisted living facility. Patient reports no palpitations, dizziness, light headedness or LOC during this event. Evaluated in the ED and found to have closed fracture of the right femur.  Patient currently received pain medication and resting comfortable.  Of note patient was admitted to our service at this hospital last month for fever and hypertension. Adjustments to his BP medications were made and treated with IV Antibiotics at the time.  (03 Mar 2023 15:37)      REVIEW OF SYSTEMS:    CONSTITUTIONAL: No fever, weight loss, or fatigue  EYES: No eye pain, visual disturbances, or discharge  ENMT:  No difficulty hearing, tinnitus, vertigo; No sinus or throat pain  NECK: No pain or stiffness  BREASTS: No pain, masses, or nipple discharge  RESPIRATORY: No cough, wheezing, chills or hemoptysis; No shortness of breath  CARDIOVASCULAR: No chest pain, palpitations, dizziness, or leg swelling  GASTROINTESTINAL: No abdominal or epigastric pain. No nausea, vomiting, or hematemesis; No diarrhea or constipation. No melena or hematochezia.  GENITOURINARY: No dysuria, frequency, hematuria, or incontinence  NEUROLOGICAL: No headaches, memory loss, loss of strength, numbness, or tremors  SKIN: No itching, burning, rashes, or lesions   LYMPH NODES: No enlarged glands  ENDOCRINE: No heat or cold intolerance; No hair loss  MUSCULOSKELETAL: No joint pain or swelling; No muscle, back, or extremity pain  PSYCHIATRIC: No depression, anxiety, mood swings, or difficulty sleeping  HEME/LYMPH: No easy bruising, or bleeding gums  ALLERGY AND IMMUNOLOGIC: No hives or eczema    MEDICATIONS  (STANDING):  acetaminophen     Tablet .. 1000 milliGRAM(s) Oral every 8 hours  aspirin enteric coated 81 milliGRAM(s) Oral daily  atorvastatin 20 milliGRAM(s) Oral at bedtime  chlorhexidine 2% Cloths 1 Application(s) Topical once  enoxaparin Injectable 40 milliGRAM(s) SubCutaneous every 24 hours  flecainide 100 milliGRAM(s) Oral every 12 hours  lactated ringers. 1000 milliLiter(s) (100 mL/Hr) IV Continuous <Continuous>  metoprolol tartrate 25 milliGRAM(s) Oral every 12 hours  mirtazapine 7.5 milliGRAM(s) Oral daily  modafinil 100 milliGRAM(s) Oral daily  pantoprazole    Tablet 40 milliGRAM(s) Oral before breakfast  polyethylene glycol 3350 17 Gram(s) Oral at bedtime  senna 2 Tablet(s) Oral at bedtime  tamsulosin 0.4 milliGRAM(s) Oral at bedtime  tranexamic acid IVPB 1000 milliGRAM(s) IV Intermittent once  tranexamic acid IVPB 1000 milliGRAM(s) IV Intermittent once    MEDICATIONS  (PRN):  ALPRAZolam 0.25 milliGRAM(s) Oral every 12 hours PRN anxiety  aluminum hydroxide/magnesium hydroxide/simethicone Suspension 30 milliLiter(s) Oral every 6 hours PRN Dyspepsia  HYDROmorphone  Injectable 0.25 milliGRAM(s) IV Push every 3 hours PRN breakthrough  magnesium hydroxide Suspension 30 milliLiter(s) Oral daily PRN Constipation  ondansetron Injectable 4 milliGRAM(s) IV Push every 6 hours PRN Nausea and/or Vomiting  oxyCODONE    IR 2.5 milliGRAM(s) Oral every 3 hours PRN Moderate Pain (4 - 6)  oxyCODONE    IR 5 milliGRAM(s) Oral every 3 hours PRN Severe Pain (7 - 10)      ALLERGIES: No Known Allergies      FAMILY HISTORY:      Social history:  Alochol:   Smoking:   Drug Use:   Marital Status:     PHYSICAL EXAMINATION:  -----------------------------  T(C): 36.7 (03-05-23 @ 09:19), Max: 37.1 (03-04-23 @ 13:39)  HR: 64 (03-05-23 @ 09:19) (62 - 665)  BP: 124/71 (03-05-23 @ 09:19) (94/70 - 160/53)  RR: 16 (03-05-23 @ 09:19) (14 - 20)  SpO2: 91% (03-05-23 @ 09:19) (91% - 98%)  Wt(kg): --    03-04 @ 07:01  -  03-05 @ 07:00  --------------------------------------------------------  IN:    Lactated Ringers: 800 mL  Total IN: 800 mL    OUT:    Blood Loss (mL): 50 mL    Voided (mL): 200 mL  Total OUT: 250 mL    Total NET: 550 mL            Constitutional: well developed, normal appearance, well groomed, well nourished, no deformities and no acute distress.   Eyes: the conjunctiva exhibited no abnormalities and the eyelids demonstrated no xanthelasmas.   HEENT: normal oral mucosa, no oral pallor and no oral cyanosis.   Neck: normal jugular venous A waves present, normal jugular venous V waves present and no jugular venous tellez A waves.   Pulmonary: no respiratory distress, normal respiratory rhythm and effort, no accessory muscle use and lungs were clear to auscultation bilaterally. Anteriorly  Cardiovascular: heart rate and rhythm were normal, normal S1 and S2 and no murmur, gallop, rub, heave or thrill are present.   Musculoskeletal: the gait could not be assessed.   Extremities: no clubbing of the fingernails, no localized cyanosis, no petechial hemorrhages and no ischemic changes.   Skin: normal skin color and pigmentation, no rash, no venous stasis, no skin lesions, no skin ulcer and no xanthoma was observed.   Psychiatric: oriented to person, place, and time, the affect was normal, the mood was normal and not feeling anxious.     LABS:   --------  03-05    140  |  105  |  23  ----------------------------<  107<H>  3.9   |  26  |  1.01    Ca    8.5      05 Mar 2023 06:00  Mg     1.9     03-04    TPro  6.0  /  Alb  2.5<L>  /  TBili  0.4  /  DBili  x   /  AST  17  /  ALT  11  /  AlkPhos  68  03-05                         11.3   12.12 )-----------( 236      ( 05 Mar 2023 06:00 )             35.0     PT/INR - ( 03 Mar 2023 14:01 )   PT: 15.5 sec;   INR: 1.34 ratio         PTT - ( 03 Mar 2023 14:01 )  PTT:30.9 sec              Radiology:

## 2023-03-05 NOTE — DISCHARGE NOTE PROVIDER - INSTRUCTIONS
For Constipation :   • Increase your water intake. Drink at least 8 glasses of water daily.  • Try adding fiber to your diet by eating fruits, vegetables and foods that are rich in grains.  • If you do experience constipation, you may take an over-the-counter stool softener/laxative such as Sara Colace, Senekot, miralax or  Milk of Magnesia.

## 2023-03-05 NOTE — DISCHARGE NOTE PROVIDER - PROVIDER TOKENS
PROVIDER:[TOKEN:[[id number]],FOLLOWUP:[2 weeks]] PROVIDER:[TOKEN:[[id number]],FOLLOWUP:[2 weeks]],PROVIDER:[TOKEN:[[id number]],FOLLOWUP:[Routine],ESTABLISHEDPATIENT:[T]]

## 2023-03-05 NOTE — DISCHARGE NOTE PROVIDER - NSDCQMPCI_CARD_ALL_CORE
Adali Dunn M.D. FACS  Daily Progress Note    Pt Name: Demetrio Rangel  Medical Record Number: [id number]  Date of Birth 1958   Today's Date: 1/28/2020    ASSESSMENT:     1. HD # 3  Active Hospital Problems    Diagnosis Date Noted    Partial small bowel obstruction ([address] [K56.600] 01/25/2020    SBO (small bowel obstruction) [facility]) [N43.693] 01/25/2020       Chief Complaint:  Chief Complaint   Patient presents with    Abdominal Pain    Emesis    Diarrhea           PLAN:     1. Patient has had 2 BM's, and contrast was in colon at first film, no signs of bowel obstruction, maybe was never sbo as transition point never seen on CT scan  2. Still c/o pain, not sure of etio based on xray films with no signs of obstruction  3. Vital signs reassuring  4. Try liquids  5. K low again, she is on replacement protocol      SUBJECTIVE:     Yohan Flaherty is doing well. Still c/o pain continuous but no signs of sbo on gastrografin study. She has nausea and no vomiting since NGT removal.  She has passed flatus and has had a bowel movement. She is tolerating DIET CLEAR LIQUID;. Current activity is ambulating in halls      OBJECTIVE:     Patient Vitals for the past 24 hrs:   BP Temp Temp src Pulse Resp SpO2   01/28/20 0430 119/67 98.4 °F (36.9 °C) Oral 80 16 95 %   01/27/20 2342 120/68 98.7 °F (37.1 °C) Oral 85 16 100 %   01/27/20 2012 (!) 142/79 98.3 °F (36.8 °C) Oral 98 16 95 %   01/27/20 1730 123/68 98 °F (36.7 °C) Oral 91 16 96 %   01/27/20 1300 121/69 98.5 °F (36.9 °C) Oral 92 16 97 %   01/27/20 0810 (!) 157/74 98.4 °F (36.9 °C) Oral 89 16 97 %         Intake/Output Summary (Last 24 hours) at 1/28/2020 0653  Last data filed at 1/28/2020 0444  Gross per 24 hour   Intake 2022.52 ml   Output 850 ml   Net 1172.52 ml       In: 2022.5 [P.O.:120; I.V.:1902.5]  Out: 850 [Urine:600]    I/O last 3 completed shifts: In: 2090 [P.O.:200;  I.V.:1890]  Out: 1150 [Urine:900; Emesis/NG previous incisions low infraumbilical midline incision no hernia  NEUROLOGIC: CN II-XII are grossly intact. There are no focalizing motor or sensory deficits  WOUND/INCISION:  healing well, no drainage, no erythema  EXTREMITY: no cyanosis, clubbing or edema      LABS:     CBC:   Recent Labs     01/25/20 1942 01/26/20  0701   WBC 13.3* 8.8   RBC 4.84 3.84*   HGB 13.9 10.9*   HCT 41.8 36.1*   MCV 86.4 94.0   MCH 28.7 28.4   MCHC 33.3 30.2*    207   MPV 12.7* 12.6*      Last 3 CMP:   Recent Labs     01/25/20 1942 01/26/20  0701 01/27/20  0706 01/28/20  0450    143 141 137   K 3.0* 3.4* 3.8 3.3*    111 107 102   CO2 21* 20* 22* 22*   BUN 22 19 14 12   CREATININE 0.7 0.6 0.5 0.5   GLUCOSE 151* 92 81 72   CALCIUM 9.6 8.3* 8.7 8.4*   PROT 8.7*  --   --   --    LABALBU 4.5  --   --   --    BILITOT 0.4  --   --   --    ALKPHOS 87  --   --   --    AST 25  --   --   --    ALT 24  --   --   --       Troponin: No results for input(s): TROPONINI in the last 72 hours. Calcium:   Lab Results   Component Value Date    CALCIUM 8.4 01/28/2020    CALCIUM 8.7 01/27/2020    CALCIUM 8.3 01/26/2020      Ionized Calcium: No results found for: IONCA   Lipids: No results for input(s): CHOL, HDL in the last 72 hours. Invalid input(s): LDLCALCU  INR: No results for input(s): INR in the last 72 hours. Lactic Acid: No results found for: LACTA               DVT prophylaxis: [] Lovenox                                 [] SCDs                                 [] SQ Heparin                                 [] Encourage ambulation, low risk for DVT, no chemical or                                      mechanical prophylaxis necessary              [] Already on Anticoagulation      RADIOLOGY:     gastrografin challenge yesterday, at 8 hours, contrast all the way to sigmoid colon      Charlie Guo M.D.  FACS  Electronically signed 1/28/2020 at 6:53 AM No

## 2023-03-05 NOTE — PHYSICAL THERAPY INITIAL EVALUATION ADULT - ADDITIONAL COMMENTS
Lives in the [facility].  No stairs.  Pt states he was independent with ADLs and amb with rolling walker.

## 2023-03-05 NOTE — PHYSICAL THERAPY INITIAL EVALUATION ADULT - MANUAL MUSCLE TESTING RESULTS, REHAB EVAL
right UE shld flex 3+/5; left UE 4/5; right LE at least 3/5 but n/t due to pain; left LE 4/5/grossly assessed due to

## 2023-03-06 ENCOUNTER — TRANSCRIPTION ENCOUNTER (OUTPATIENT)
Age: 83
End: 2023-03-06

## 2023-03-06 LAB
ANION GAP SERPL CALC-SCNC: 6 MMOL/L — SIGNIFICANT CHANGE UP (ref 5–17)
BLD GP AB SCN SERPL QL: SIGNIFICANT CHANGE UP
BUN SERPL-MCNC: 27 MG/DL — HIGH (ref 7–23)
CALCIUM SERPL-MCNC: 8.7 MG/DL — SIGNIFICANT CHANGE UP (ref 8.4–10.5)
CHLORIDE SERPL-SCNC: 105 MMOL/L — SIGNIFICANT CHANGE UP (ref 96–108)
CO2 SERPL-SCNC: 31 MMOL/L — SIGNIFICANT CHANGE UP (ref 22–31)
CREAT SERPL-MCNC: 1.16 MG/DL — SIGNIFICANT CHANGE UP (ref 0.5–1.3)
EGFR: 62 ML/MIN/1.73M2 — SIGNIFICANT CHANGE UP
GLUCOSE BLDC GLUCOMTR-MCNC: 105 MG/DL — HIGH (ref 70–99)
GLUCOSE SERPL-MCNC: 103 MG/DL — HIGH (ref 70–99)
HCT VFR BLD CALC: 30 % — LOW (ref 39–50)
HCT VFR BLD CALC: 30.3 % — LOW (ref 39–50)
HGB BLD-MCNC: 10 G/DL — LOW (ref 13–17)
HGB BLD-MCNC: 9.7 G/DL — LOW (ref 13–17)
MCHC RBC-ENTMCNC: 29 PG — SIGNIFICANT CHANGE UP (ref 27–34)
MCHC RBC-ENTMCNC: 29.6 PG — SIGNIFICANT CHANGE UP (ref 27–34)
MCHC RBC-ENTMCNC: 32.3 GM/DL — SIGNIFICANT CHANGE UP (ref 32–36)
MCHC RBC-ENTMCNC: 33 GM/DL — SIGNIFICANT CHANGE UP (ref 32–36)
MCV RBC AUTO: 89.6 FL — SIGNIFICANT CHANGE UP (ref 80–100)
MCV RBC AUTO: 89.8 FL — SIGNIFICANT CHANGE UP (ref 80–100)
NRBC # BLD: 0 /100 WBCS — SIGNIFICANT CHANGE UP (ref 0–0)
NRBC # BLD: 0 /100 WBCS — SIGNIFICANT CHANGE UP (ref 0–0)
PLATELET # BLD AUTO: 215 K/UL — SIGNIFICANT CHANGE UP (ref 150–400)
PLATELET # BLD AUTO: 227 K/UL — SIGNIFICANT CHANGE UP (ref 150–400)
POTASSIUM SERPL-MCNC: 3.4 MMOL/L — LOW (ref 3.5–5.3)
POTASSIUM SERPL-SCNC: 3.4 MMOL/L — LOW (ref 3.5–5.3)
RBC # BLD: 3.34 M/UL — LOW (ref 4.2–5.8)
RBC # BLD: 3.38 M/UL — LOW (ref 4.2–5.8)
RBC # FLD: 13.9 % — SIGNIFICANT CHANGE UP (ref 10.3–14.5)
RBC # FLD: 14.1 % — SIGNIFICANT CHANGE UP (ref 10.3–14.5)
SODIUM SERPL-SCNC: 142 MMOL/L — SIGNIFICANT CHANGE UP (ref 135–145)
WBC # BLD: 10.26 K/UL — SIGNIFICANT CHANGE UP (ref 3.8–10.5)
WBC # BLD: 9.53 K/UL — SIGNIFICANT CHANGE UP (ref 3.8–10.5)
WBC # FLD AUTO: 10.26 K/UL — SIGNIFICANT CHANGE UP (ref 3.8–10.5)
WBC # FLD AUTO: 9.53 K/UL — SIGNIFICANT CHANGE UP (ref 3.8–10.5)

## 2023-03-06 PROCEDURE — 99232 SBSQ HOSP IP/OBS MODERATE 35: CPT

## 2023-03-06 RX ORDER — CELECOXIB 200 MG/1
1 CAPSULE ORAL
Qty: 0 | Refills: 0 | DISCHARGE
Start: 2023-03-06

## 2023-03-06 RX ORDER — OXYCODONE HYDROCHLORIDE 5 MG/1
1 TABLET ORAL
Qty: 0 | Refills: 0 | DISCHARGE
Start: 2023-03-06

## 2023-03-06 RX ORDER — OXYCODONE HYDROCHLORIDE 5 MG/1
2.5 TABLET ORAL
Qty: 0 | Refills: 0 | DISCHARGE
Start: 2023-03-06

## 2023-03-06 RX ORDER — CELECOXIB 200 MG/1
100 CAPSULE ORAL EVERY 12 HOURS
Refills: 0 | Status: DISCONTINUED | OUTPATIENT
Start: 2023-03-06 | End: 2023-03-08

## 2023-03-06 RX ORDER — SODIUM CHLORIDE 9 MG/ML
500 INJECTION INTRAMUSCULAR; INTRAVENOUS; SUBCUTANEOUS ONCE
Refills: 0 | Status: COMPLETED | OUTPATIENT
Start: 2023-03-06 | End: 2023-03-06

## 2023-03-06 RX ADMIN — CELECOXIB 100 MILLIGRAM(S): 200 CAPSULE ORAL at 22:24

## 2023-03-06 RX ADMIN — Medication 1000 MILLIGRAM(S): at 22:24

## 2023-03-06 RX ADMIN — MIRTAZAPINE 7.5 MILLIGRAM(S): 45 TABLET, ORALLY DISINTEGRATING ORAL at 11:19

## 2023-03-06 RX ADMIN — Medication 1000 MILLIGRAM(S): at 08:25

## 2023-03-06 RX ADMIN — CELECOXIB 100 MILLIGRAM(S): 200 CAPSULE ORAL at 23:24

## 2023-03-06 RX ADMIN — OXYCODONE HYDROCHLORIDE 5 MILLIGRAM(S): 5 TABLET ORAL at 05:32

## 2023-03-06 RX ADMIN — Medication 81 MILLIGRAM(S): at 11:19

## 2023-03-06 RX ADMIN — SODIUM CHLORIDE 500 MILLILITER(S): 9 INJECTION INTRAMUSCULAR; INTRAVENOUS; SUBCUTANEOUS at 08:45

## 2023-03-06 RX ADMIN — Medication 25 MILLIGRAM(S): at 18:08

## 2023-03-06 RX ADMIN — SENNA PLUS 2 TABLET(S): 8.6 TABLET ORAL at 21:45

## 2023-03-06 RX ADMIN — OXYCODONE HYDROCHLORIDE 5 MILLIGRAM(S): 5 TABLET ORAL at 05:02

## 2023-03-06 RX ADMIN — ENOXAPARIN SODIUM 40 MILLIGRAM(S): 100 INJECTION SUBCUTANEOUS at 09:09

## 2023-03-06 RX ADMIN — MODAFINIL 100 MILLIGRAM(S): 200 TABLET ORAL at 11:19

## 2023-03-06 RX ADMIN — ATORVASTATIN CALCIUM 20 MILLIGRAM(S): 80 TABLET, FILM COATED ORAL at 21:44

## 2023-03-06 RX ADMIN — Medication 1000 MILLIGRAM(S): at 07:28

## 2023-03-06 RX ADMIN — PANTOPRAZOLE SODIUM 40 MILLIGRAM(S): 20 TABLET, DELAYED RELEASE ORAL at 05:54

## 2023-03-06 RX ADMIN — CELECOXIB 200 MILLIGRAM(S): 200 CAPSULE ORAL at 06:27

## 2023-03-06 RX ADMIN — Medication 1000 MILLIGRAM(S): at 23:24

## 2023-03-06 RX ADMIN — Medication 25 MILLIGRAM(S): at 05:53

## 2023-03-06 RX ADMIN — POLYETHYLENE GLYCOL 3350 17 GRAM(S): 17 POWDER, FOR SOLUTION ORAL at 21:44

## 2023-03-06 RX ADMIN — TAMSULOSIN HYDROCHLORIDE 0.4 MILLIGRAM(S): 0.4 CAPSULE ORAL at 21:44

## 2023-03-06 RX ADMIN — CELECOXIB 200 MILLIGRAM(S): 200 CAPSULE ORAL at 05:53

## 2023-03-06 RX ADMIN — Medication 100 MILLIGRAM(S): at 05:54

## 2023-03-06 NOTE — PROGRESS NOTE ADULT - ASSESSMENT
The patient is an 82 year old male with a history of paroxysmal atrial fibrillation, HTN, SVT, CVA, dementia who presents with a fall.    Plan:  - ECG with sinus rhythm and underlying RBBB  - Echo 1/23 with normal LV systolic function, no significant valve issues  - Remain off flecainide for now  - Continue metoprolol tartrate 25 mg bid  - Not on anticoagulation for atrial fibrillation - defer to outpatient cardiologist. Presumably there has not been recent episodes of atrial fibrillation.  - Continue aspirin 81 mg daily  - BP on low side - receiving IV fluids  - Monitor hemoglobin - trending down

## 2023-03-06 NOTE — OCCUPATIONAL THERAPY INITIAL EVALUATION ADULT - PERTINENT HX OF CURRENT PROBLEM, REHAB EVAL
brought in by EMS after having a mechanical fall at his assisted living facility. Patient reports no palpitations, dizziness, light headedness or LOC during this event. Evaluated in the ED and found to have closed fracture of the right femur.  Pt. now s/p right hip ORIF with IM raffi.

## 2023-03-06 NOTE — DISCHARGE NOTE NURSING/CASE MANAGEMENT/SOCIAL WORK - NSDCPEFALRISK_GEN_ALL_CORE
For information on Fall & Injury Prevention, visit: [email or web address] OR  [email or web address] OR  https://www.cdc.gov/steadi/patient.html

## 2023-03-06 NOTE — PATIENT CHOICE NOTE. - NSPTCHOICESTATE_GEN_ALL_CORE

## 2023-03-06 NOTE — CARE COORDINATION ASSESSMENT. - NSCAREPROVIDERS_GEN_ALL_CORE_FT
CARE PROVIDERS:  Accepting Physician: Desean Palencia  Administration: Roderick Sofia  Administration: Uzair Bridges  Administration: Carmita Whitaker  Administration: Blanca Denton  Admitting: Desean Palencia  Attending: Desean Palencia  Consultant: Leonard Corrales  Consultant: Ganesh Sanchez  Consultant: Candida Traore  Consultant: Christel Corrales  Covering Team: Randolph Ley  ED ACP: Leslie Grace  ED Attending: Maxx Theodore  ED Nurse: Zaira Howe  Infection Control: Wanda Magaña  Nurse: Kayy Batista  Nurse: Maggie Bishop  Nurse: Princess Savage  Ordered: Delmar Nuñez  PCA/Nursing Assistant: Mireya Wolfe  PCA/Nursing Assistant: Staci Monreal  Physical Therapy: Yessica Ricardo  Physical Therapy: Manpreet Anton  Primary Team: Gigi Ospina  Primary Team: Sabina Galindo  Primary Team: Saul Bishop  Primary Team: China Rowell  Primary Team: Yessica Lockwood  Primary Team: Luís Howard  Quality Review: Tosha Crowder  : Julia Enriquez  Team: [facility], Team  UR// Supp. Assoc.: Natalie Lopez

## 2023-03-06 NOTE — PROGRESS NOTE ADULT - SUBJECTIVE AND OBJECTIVE BOX
Chief Complaint: Fall    Interval Events: BP low this am.    Review of Systems:  General: No fevers, chills, weight gain  Skin: No rashes, color changes  Cardiovascular: No chest pain, orthopnea  Respiratory: No shortness of breath, cough  Gastrointestinal: No nausea, abdominal pain  Genitourinary: No incontinence, pain with urination  Musculoskeletal: No pain, swelling, decreased range of motion  Neurological: No headache, weakness  Psychiatric: No depression, anxiety  Endocrine: No weight gain, increased thirst  All other systems are comprehensively negative.    Physical Exam:  Vitals:        Vital Signs Last 24 Hrs  T(C): 36.8 (06 Mar 2023 08:37), Max: 37.1 (05 Mar 2023 13:30)  T(F): 98.3 (06 Mar 2023 08:37), Max: 98.7 (05 Mar 2023 13:30)  HR: 55 (06 Mar 2023 08:37) (55 - 80)  BP: 88/54 (06 Mar 2023 08:37) (88/54 - 150/85)  BP(mean): --  RR: 18 (06 Mar 2023 08:37) (16 - 19)  SpO2: 96% (06 Mar 2023 08:37) (86% - 96%)  Parameters below as of 06 Mar 2023 08:37  Patient On (Oxygen Delivery Method): nasal cannula  O2 Flow (L/min): 2  General: NAD  HEENT: MMM  Neck: No JVD, no carotid bruit  Lungs: CTAB  CV: RRR, nl S1/S2, no M/R/G  Abdomen: S/NT/ND, +BS  Extremities: No LE edema, no cyanosis  Neuro: AAOx3, non-focal  Skin: No rash    Labs:                        9.7    10.26 )-----------( 227      ( 06 Mar 2023 08:22 )             30.0     03-06    142  |  105  |  27<H>  ----------------------------<  103<H>  3.4<L>   |  31  |  1.16    Ca    8.7      06 Mar 2023 08:22    TPro  6.0  /  Alb  2.5<L>  /  TBili  0.4  /  DBili  x   /  AST  17  /  ALT  11  /  AlkPhos  68  03-05            ECG/Telemetry: Sinus rhythm

## 2023-03-06 NOTE — CARE COORDINATION ASSESSMENT. - NS SW [facility] DISCIPLINE
Awaiting call back from assisted living facility to confirm which home care agency was rendering services/physical therapy

## 2023-03-06 NOTE — CARE COORDINATION ASSESSMENT. - RETURN TO PRIOR LIVING ARRANGEMENTS
Patient's ability to return to his assisted living facility depends on his functional status on discharge; daughter Kenya is aware of and agreeable to recommendation for sub-acute rehab on discharge and has requested referral be sent to [facility] in [place], which  did accordingly./Yes

## 2023-03-06 NOTE — PROGRESS NOTE ADULT - SUBJECTIVE AND OBJECTIVE BOX
Patient is a 82y old  Male who presents with a chief complaint of right hip Fracture--for ORIF rgiht hip fracture   (05 Mar 2023 11:13)        HPI:  82M Atrial Fibrillation, HTN, HLD, Anxiety and Depression brought in by EMS after having a mechanical fall at his assisted living facility. Patient reports no palpitations, dizziness, light headedness or LOC during this event. Evaluated in the ED and found to have closed fracture of the right femur.  Patient currently received pain medication and resting comfortable.  Of note patient was admitted to our service at this hospital last month for fever and hypertension. Adjustments to his BP medications were made and treated with IV Antibiotics at the time.  (03 Mar 2023 15:37)      SUBJECTIVE & OBJECTIVE: Pt seen and examined at bedside.hypontensive, suspect from opiods     PHYSICAL EXAM:  T(C): 36.8 (03-06-23 @ 08:37), Max: 37.1 (03-05-23 @ 13:30)  HR: 58 (03-06-23 @ 09:51) (55 - 80)  BP: 122/73 (03-06-23 @ 09:51) (88/54 - 150/85)  RR: 18 (03-06-23 @ 08:37) (16 - 19)  SpO2: 96% (03-06-23 @ 08:37) (86% - 96%)  Wt(kg): --   GENERAL: NAD, well-groomed, well-developed  HEAD:  Atraumatic, Normocephalic  NECK: Supple, No JVD  NERVOUS SYSTEM:  Alert & Oriented X3,  CHEST/LUNG: Clear to auscultation bilaterally; No rales, rhonchi, wheezing, or rubs  HEART: Regular rate and rhythm; No murmurs, rubs, or gallops  ABDOMEN: Soft, Nontender, Nondistended; Bowel sounds present  EXTREMITIES:  2+ Peripheral Pulses, No clubbing, cyanosis, or edema        MEDICATIONS  (STANDING):  acetaminophen     Tablet .. 1000 milliGRAM(s) Oral every 8 hours  aspirin enteric coated 81 milliGRAM(s) Oral daily  atorvastatin 20 milliGRAM(s) Oral at bedtime  celecoxib 200 milliGRAM(s) Oral every 12 hours  chlorhexidine 2% Cloths 1 Application(s) Topical once  enoxaparin Injectable 40 milliGRAM(s) SubCutaneous every 24 hours  lactated ringers. 1000 milliLiter(s) (100 mL/Hr) IV Continuous <Continuous>  metoprolol tartrate 25 milliGRAM(s) Oral every 12 hours  mirtazapine 7.5 milliGRAM(s) Oral daily  modafinil 100 milliGRAM(s) Oral daily  pantoprazole    Tablet 40 milliGRAM(s) Oral before breakfast  polyethylene glycol 3350 17 Gram(s) Oral at bedtime  senna 2 Tablet(s) Oral at bedtime  tamsulosin 0.4 milliGRAM(s) Oral at bedtime  tranexamic acid IVPB 1000 milliGRAM(s) IV Intermittent once  tranexamic acid IVPB 1000 milliGRAM(s) IV Intermittent once    MEDICATIONS  (PRN):  ALPRAZolam 0.25 milliGRAM(s) Oral every 12 hours PRN anxiety  aluminum hydroxide/magnesium hydroxide/simethicone Suspension 30 milliLiter(s) Oral every 6 hours PRN Dyspepsia  HYDROmorphone  Injectable 0.25 milliGRAM(s) IV Push every 3 hours PRN breakthrough  magnesium hydroxide Suspension 30 milliLiter(s) Oral daily PRN Constipation  ondansetron Injectable 4 milliGRAM(s) IV Push every 6 hours PRN Nausea and/or Vomiting  oxyCODONE    IR 5 milliGRAM(s) Oral every 3 hours PRN Severe Pain (7 - 10)  oxyCODONE    IR 2.5 milliGRAM(s) Oral every 3 hours PRN Moderate Pain (4 - 6)      LABS:                        10.0   9.53  )-----------( 215      ( 06 Mar 2023 10:22 )             30.3     03-06    142  |  105  |  27<H>  ----------------------------<  103<H>  3.4<L>   |  31  |  1.16    Ca    8.7      06 Mar 2023 08:22    TPro  6.0  /  Alb  2.5<L>  /  TBili  0.4  /  DBili  x   /  AST  17  /  ALT  11  /  AlkPhos  68  03-05          CAPILLARY BLOOD GLUCOSE          CAPILLARY BLOOD GLUCOSE        CAPILLARY BLOOD GLUCOSE                RECENT CULTURES:      RADIOLOGY & ADDITIONAL TESTS:                        DVT/GI ppx  Discussed with pt @ bedside

## 2023-03-06 NOTE — PROGRESS NOTE ADULT - ASSESSMENT
82M Atrial Fibrillation, HTN, HLD, Anxiety and Depression admitted for Right Femur Fracture        hypotensive   suspect from opiods  given half liter bolus  mointor Bp    Right Femur Fracture  Continue Bowel and pain control regimen;    Incentive Spirometer for lung expansion;   CT C-spine with severe degenerative changes in C2; Anesthesia made aware  s/p Open reduction and internal fixation (ORIF) of fracture of right hip using intramedullary raffi   d/c planning to nakul    Atrial Fibrillation / HTN / HLD   Currently Sinus and has underlying RBBB; Has history of SVT  Felcainide 100mg BID + Metoprolol 25mg BID + Statin  No AC and suspect patient is fall risk given the multiple rib fractures       Asthma  Albuterol PRN      Anxiety / Depression  Xanax BID     Diet  Regular      DVT Prophylaxis   SCD    Disposition  nakul

## 2023-03-06 NOTE — PROGRESS NOTE ADULT - SUBJECTIVE AND OBJECTIVE BOX
POST OPERATIVE DAY #:  [ 2]   STATUS POST:  Right hip orif             Patient is a 82y old  Male who presents with a chief complaint of Hip Fracture (06 Mar 2023 10:32)      Pain well controlled    OBJECTIVE:     Vital Signs Last 24 Hrs  T(C): 36.8 (06 Mar 2023 08:37), Max: 37.1 (05 Mar 2023 13:30)  T(F): 98.3 (06 Mar 2023 08:37), Max: 98.7 (05 Mar 2023 13:30)  HR: 58 (06 Mar 2023 09:51) (55 - 80)  BP: 122/73 (06 Mar 2023 09:51) (88/54 - 150/85)  BP(mean): --  RR: 18 (06 Mar 2023 08:37) (16 - 19)  SpO2: 96% (06 Mar 2023 08:37) (86% - 96%)    Parameters below as of 06 Mar 2023 08:37  Patient On (Oxygen Delivery Method): nasal cannula  O2 Flow (L/min): 2      Affected extremity:       silverlon   Dressing:  clean/dry/intact           Sensation; intact to light touch           Motor exam: Toes warm and mobile         No calf tenderness bilateral LE's    LABS:                        10.0   9.53  )-----------( 215      ( 06 Mar 2023 10:22 )             30.3     03-06    142  |  105  |  27<H>  ----------------------------<  103<H>  3.4<L>   |  31  |  1.16    Ca    8.7      06 Mar 2023 08:22    TPro  6.0  /  Alb  2.5<L>  /  TBili  0.4  /  DBili  x   /  AST  17  /  ALT  11  /  AlkPhos  68  03-05            A/P :    -    Analgesics PRN  -    DVT ppx: [ ]ASA 81 bid [x ] Lovenox [ ] Coumadin   [ ] Eliquis   -    Weight bearing status: WBAT [x ]        PWB    [ ]     TTWB  [ ]      NWB  [ ]  -    Physical Therapy  -    Occupational Therapy  -    Dispo: Home [ ]     Rehab [ ]      [facility] [ ]      To be determined [ x]   POST OPERATIVE DAY #:  [ 2]   STATUS POST:  Right hip orif             Patient is a 82y old  Male who presents with a chief complaint of Hip Fracture (06 Mar 2023 10:32)      Pain well controlled    OBJECTIVE:     Vital Signs Last 24 Hrs  T(C): 36.8 (06 Mar 2023 08:37), Max: 37.1 (05 Mar 2023 13:30)  T(F): 98.3 (06 Mar 2023 08:37), Max: 98.7 (05 Mar 2023 13:30)  HR: 58 (06 Mar 2023 09:51) (55 - 80)  BP: 122/73 (06 Mar 2023 09:51) (88/54 - 150/85)  BP(mean): --  RR: 18 (06 Mar 2023 08:37) (16 - 19)  SpO2: 96% (06 Mar 2023 08:37) (86% - 96%)    Parameters below as of 06 Mar 2023 08:37  Patient On (Oxygen Delivery Method): nasal cannula  O2 Flow (L/min): 2      Affected extremity:       silverlon   Dressing:  clean/dry/intact           Sensation; intact to light touch           Motor exam: Toes warm and mobile         No calf tenderness bilateral LE's    LABS:                        10.0   9.53  )-----------( 215      ( 06 Mar 2023 10:22 )             30.3     03-06    142  |  105  |  27<H>  ----------------------------<  103<H>  3.4<L>   |  31  |  1.16    Ca    8.7      06 Mar 2023 08:22    TPro  6.0  /  Alb  2.5<L>  /  TBili  0.4  /  DBili  x   /  AST  17  /  ALT  11  /  AlkPhos  68  03-05            A/P :    -    Analgesics PRN  -    DVT ppx: [ ]ASA 81 bid [x ] Lovenox [ ] Coumadin   [ ] Eliquis   -    Weight bearing status: WBAT [x ]        PWB    [ ]     TTWB  [ ]      NWB  [ ]  -    Physical Therapy  -    Occupational Therapy  -    Dispo: Home [ ]     Rehab [ ]      [facility] [ x]      To be determined [ ]

## 2023-03-06 NOTE — DISCHARGE NOTE NURSING/CASE MANAGEMENT/SOCIAL WORK - PATIENT PORTAL LINK FT
You can access the FollowMyHealth Patient Portal offered by [facility] by registering at the following website: [email or web address]. By joining [facility]’s FollowMyHealth portal, you will also be able to view your health information using other applications (apps) compatible with our system.

## 2023-03-07 LAB
ALBUMIN SERPL ELPH-MCNC: 2.5 G/DL — LOW (ref 3.3–5)
ALP SERPL-CCNC: 66 U/L — SIGNIFICANT CHANGE UP (ref 30–120)
ALT FLD-CCNC: 15 U/L DA — SIGNIFICANT CHANGE UP (ref 10–60)
ANION GAP SERPL CALC-SCNC: 9 MMOL/L — SIGNIFICANT CHANGE UP (ref 5–17)
AST SERPL-CCNC: 19 U/L — SIGNIFICANT CHANGE UP (ref 10–40)
BILIRUB SERPL-MCNC: 0.7 MG/DL — SIGNIFICANT CHANGE UP (ref 0.2–1.2)
BUN SERPL-MCNC: 25 MG/DL — HIGH (ref 7–23)
CALCIUM SERPL-MCNC: 8.4 MG/DL — SIGNIFICANT CHANGE UP (ref 8.4–10.5)
CHLORIDE SERPL-SCNC: 105 MMOL/L — SIGNIFICANT CHANGE UP (ref 96–108)
CO2 SERPL-SCNC: 28 MMOL/L — SIGNIFICANT CHANGE UP (ref 22–31)
CREAT SERPL-MCNC: 0.93 MG/DL — SIGNIFICANT CHANGE UP (ref 0.5–1.3)
EGFR: 81 ML/MIN/1.73M2 — SIGNIFICANT CHANGE UP
FERRITIN SERPL-MCNC: 490 NG/ML — HIGH (ref 30–400)
FOLATE SERPL-MCNC: 3.3 NG/ML — LOW
GLUCOSE SERPL-MCNC: 101 MG/DL — HIGH (ref 70–99)
HCT VFR BLD CALC: 29.2 % — LOW (ref 39–50)
HGB BLD-MCNC: 9.7 G/DL — LOW (ref 13–17)
IRON SATN MFR SERPL: 13 % — LOW (ref 16–55)
IRON SATN MFR SERPL: 21 UG/DL — LOW (ref 45–165)
MCHC RBC-ENTMCNC: 29.7 PG — SIGNIFICANT CHANGE UP (ref 27–34)
MCHC RBC-ENTMCNC: 33.2 GM/DL — SIGNIFICANT CHANGE UP (ref 32–36)
MCV RBC AUTO: 89.3 FL — SIGNIFICANT CHANGE UP (ref 80–100)
NRBC # BLD: 0 /100 WBCS — SIGNIFICANT CHANGE UP (ref 0–0)
PLATELET # BLD AUTO: 239 K/UL — SIGNIFICANT CHANGE UP (ref 150–400)
POTASSIUM SERPL-MCNC: 3.7 MMOL/L — SIGNIFICANT CHANGE UP (ref 3.5–5.3)
POTASSIUM SERPL-SCNC: 3.7 MMOL/L — SIGNIFICANT CHANGE UP (ref 3.5–5.3)
PROT SERPL-MCNC: 5.5 G/DL — LOW (ref 6–8.3)
RBC # BLD: 3.27 M/UL — LOW (ref 4.2–5.8)
RBC # FLD: 14.1 % — SIGNIFICANT CHANGE UP (ref 10.3–14.5)
SODIUM SERPL-SCNC: 142 MMOL/L — SIGNIFICANT CHANGE UP (ref 135–145)
TIBC SERPL-MCNC: 163 UG/DL — LOW (ref 220–430)
TSH SERPL-MCNC: 1.28 UIU/ML — SIGNIFICANT CHANGE UP (ref 0.27–4.2)
UIBC SERPL-MCNC: 142 UG/DL — SIGNIFICANT CHANGE UP (ref 110–370)
VIT B12 SERPL-MCNC: 293 PG/ML — SIGNIFICANT CHANGE UP (ref 232–1245)
WBC # BLD: 9.18 K/UL — SIGNIFICANT CHANGE UP (ref 3.8–10.5)
WBC # FLD AUTO: 9.18 K/UL — SIGNIFICANT CHANGE UP (ref 3.8–10.5)

## 2023-03-07 PROCEDURE — 99232 SBSQ HOSP IP/OBS MODERATE 35: CPT

## 2023-03-07 RX ADMIN — Medication 81 MILLIGRAM(S): at 12:16

## 2023-03-07 RX ADMIN — PANTOPRAZOLE SODIUM 40 MILLIGRAM(S): 20 TABLET, DELAYED RELEASE ORAL at 05:34

## 2023-03-07 RX ADMIN — Medication 1000 MILLIGRAM(S): at 21:41

## 2023-03-07 RX ADMIN — Medication 1000 MILLIGRAM(S): at 06:09

## 2023-03-07 RX ADMIN — Medication 1000 MILLIGRAM(S): at 07:09

## 2023-03-07 RX ADMIN — ENOXAPARIN SODIUM 40 MILLIGRAM(S): 100 INJECTION SUBCUTANEOUS at 08:42

## 2023-03-07 RX ADMIN — ATORVASTATIN CALCIUM 20 MILLIGRAM(S): 80 TABLET, FILM COATED ORAL at 21:41

## 2023-03-07 RX ADMIN — Medication 1000 MILLIGRAM(S): at 22:01

## 2023-03-07 RX ADMIN — MIRTAZAPINE 7.5 MILLIGRAM(S): 45 TABLET, ORALLY DISINTEGRATING ORAL at 12:15

## 2023-03-07 RX ADMIN — CELECOXIB 100 MILLIGRAM(S): 200 CAPSULE ORAL at 22:01

## 2023-03-07 RX ADMIN — Medication 25 MILLIGRAM(S): at 05:35

## 2023-03-07 RX ADMIN — CELECOXIB 100 MILLIGRAM(S): 200 CAPSULE ORAL at 21:43

## 2023-03-07 RX ADMIN — CELECOXIB 100 MILLIGRAM(S): 200 CAPSULE ORAL at 09:45

## 2023-03-07 RX ADMIN — Medication 25 MILLIGRAM(S): at 18:36

## 2023-03-07 RX ADMIN — Medication 1000 MILLIGRAM(S): at 15:27

## 2023-03-07 RX ADMIN — MODAFINIL 100 MILLIGRAM(S): 200 TABLET ORAL at 12:15

## 2023-03-07 RX ADMIN — TAMSULOSIN HYDROCHLORIDE 0.4 MILLIGRAM(S): 0.4 CAPSULE ORAL at 21:41

## 2023-03-07 RX ADMIN — SENNA PLUS 2 TABLET(S): 8.6 TABLET ORAL at 21:41

## 2023-03-07 RX ADMIN — CELECOXIB 100 MILLIGRAM(S): 200 CAPSULE ORAL at 08:43

## 2023-03-07 NOTE — PROGRESS NOTE ADULT - SUBJECTIVE AND OBJECTIVE BOX
Chief Complaint: Fall    Interval Events: No events overnight.    Review of Systems:  General: No fevers, chills, weight gain  Skin: No rashes, color changes  Cardiovascular: No chest pain, orthopnea  Respiratory: No shortness of breath, cough  Gastrointestinal: No nausea, abdominal pain  Genitourinary: No incontinence, pain with urination  Musculoskeletal: No pain, swelling, decreased range of motion  Neurological: No headache, weakness  Psychiatric: No depression, anxiety  Endocrine: No weight gain, increased thirst  All other systems are comprehensively negative.    Physical Exam:  Vital Signs Last 24 Hrs  T(C): 36.6 (07 Mar 2023 05:26), Max: 37.2 (06 Mar 2023 21:52)  T(F): 97.9 (07 Mar 2023 05:26), Max: 99 (06 Mar 2023 21:52)  HR: 67 (07 Mar 2023 05:26) (58 - 70)  BP: 144/72 (07 Mar 2023 05:26) (102/64 - 151/80)  BP(mean): --  RR: 18 (07 Mar 2023 05:26) (17 - 18)  SpO2: 92% (07 Mar 2023 05:26) (92% - 97%)  Parameters below as of 07 Mar 2023 05:26  Patient On (Oxygen Delivery Method): room air  General: NAD  HEENT: MMM  Neck: No JVD, no carotid bruit  Lungs: CTAB  CV: RRR, nl S1/S2, no M/R/G  Abdomen: S/NT/ND, +BS  Extremities: No LE edema, no cyanosis  Neuro: AAOx3, non-focal  Skin: No rash    Labs:    03-06    142  |  105  |  27<H>  ----------------------------<  103<H>  3.4<L>   |  31  |  1.16    Ca    8.7      06 Mar 2023 08:22                          9.7    9.18  )-----------( 239      ( 07 Mar 2023 08:21 )             29.2       ECG/Telemetry: Sinus rhythm

## 2023-03-07 NOTE — DIETITIAN INITIAL EVALUATION ADULT - OTHER INFO
Visited patient in room, presents with poor appetite/po intake, consuming <50% of meals. Denies n/v/d/c, last BM 2 days ago, bowel regimen in place. No reported difficulty chewing or swallowing. NKFA, confirmed lactose intolerance. Reported # 9 months ago, 200# in Jan2023, current adm weight 200#, weight appears to be stable x1 month, 9%/insignificant weight loss in 1 year, will continue to monitor weight trends as able.     Pertinent medications/nutrition labs reviewed; noted elevated BUN, receiving LR in house.    Educated on adequate protein/energy intake to prevent weight loss/promote wound healing, prioritize protein at each meal. Food preferences obtained, will honor as able to encourage intake. Pt declined ONS at this time, will provide more protein at each meals. Encourage po intake as needed. Pending %po, will reintroduce ONS. Pt receptive, no nutrition related questions at this time. RD to continue to monitor nutrition status per protocol.  Visited patient in room, presents with poor appetite/po intake, consuming <50% of meals. Denies n/v/d/c, last BM 2 days ago, bowel regimen in place. No reported difficulty chewing or swallowing. NKFA, confirmed lactose intolerance, pt states can tolerate milk ingredient, but liquid milk. Reported # 9 months ago, 200# in Jan2023, current adm weight 200#, weight appears to be stable x1 month, 9%/insignificant weight loss in 1 year, will continue to monitor weight trends as able.     Pertinent medications/nutrition labs reviewed; noted elevated BUN, receiving LR in house.    Educated on adequate protein/energy intake to prevent weight loss/promote wound healing, prioritize protein at each meal. Food preferences obtained, will honor as able to encourage intake. Pt declined ONS at this time, will provide more protein at each meals. Encourage po intake as needed. Pending %po, will reintroduce ONS. Pt receptive, no nutrition related questions at this time. RD to continue to monitor nutrition status per protocol.

## 2023-03-07 NOTE — CONSULT NOTE ADULT - ASSESSMENT
Patient presents with  1. skin warm, dry right hip small 0.3 x 0.5 x 0.2 of unknown etiology scant serosanguinous drainage,   No odor, erythema, warmth, tenderness, induration, fluctuance  recommendation:   -DSD QOD  At risk for altered tissue perfusion /YES  Impaired perfusion of peripheral tissue /YES  Continue  Nutrition (as tolerated)  Continue  Offloading   Continue Pericare  Care as per medicine will follow w/ you  Findings and recommendations discussed with CHRIS Johnson   Thank you for this consult  Candida Traore NP, [facility] [phone]
81y/o over weight. Seen at [facility] telemetry. Daughter at bedside  Lying flat, comfortably  History HTN, PAF, high cholesterol, RBBB, BPH, CVA  Per prior notes, had normal Stress test recently    Admitted for mechanical fall and fractured right hip  No LOC, palpitations, dizziness, etc  No recent significant cardiac symptoms    Impression  Fractured right hip from mechanical fall  Known PAF (currently in NSR)    Plan:  - Continue Metoprolol tartrate-25mg BID                  Crestor-5mg OD                  ASA-81mg OD  - Due to above EKG changes will hold fecainide  - Pain control  - 1/18/23 Echocardiogram with LVH, normal EF, mild AI  - Patient is considered low to intermediate risk for cardiac events for proposed right hip surgery    He is optimized from a cardiac stand-point and my proceed without any additional cardiac testing

## 2023-03-07 NOTE — DIETITIAN INITIAL EVALUATION ADULT - ORAL INTAKE PTA/DIET HISTORY
Per transfer document, pt from [facility] was on a regular diet. Pt previously admitted over a month ago with similar dx. States a picky eater, over po intake was fair.

## 2023-03-07 NOTE — PROGRESS NOTE ADULT - ASSESSMENT
The patient is an 82 year old male with a history of paroxysmal atrial fibrillation, HTN, SVT, CVA, dementia who presents with a fall.    Plan:  - ECG with sinus rhythm and underlying RBBB  - Echo 1/23 with normal LV systolic function, no significant valve issues  - Remain off flecainide for now  - Continue metoprolol tartrate 25 mg bid  - Not on anticoagulation for atrial fibrillation - defer to outpatient cardiologist. Presumably there has not been recent episodes of atrial fibrillation.  - Continue aspirin 81 mg daily  - Monitor hemoglobin

## 2023-03-07 NOTE — DIETITIAN INITIAL EVALUATION ADULT - PERTINENT LABORATORY DATA
03-07    142  |  105  |  25<H>  ----------------------------<  101<H>  3.7   |  28  |  0.93    Ca    8.4      07 Mar 2023 08:21    TPro  5.5<L>  /  Alb  2.5<L>  /  TBili  0.7  /  DBili  x   /  AST  19  /  ALT  15  /  AlkPhos  66  03-07

## 2023-03-07 NOTE — DIETITIAN INITIAL EVALUATION ADULT - PERTINENT MEDS FT
MEDICATIONS  (STANDING):  acetaminophen     Tablet .. 1000 milliGRAM(s) Oral every 8 hours  aspirin enteric coated 81 milliGRAM(s) Oral daily  atorvastatin 20 milliGRAM(s) Oral at bedtime  celecoxib 100 milliGRAM(s) Oral every 12 hours  chlorhexidine 2% Cloths 1 Application(s) Topical once  enoxaparin Injectable 40 milliGRAM(s) SubCutaneous every 24 hours  lactated ringers. 1000 milliLiter(s) (100 mL/Hr) IV Continuous <Continuous>  metoprolol tartrate 25 milliGRAM(s) Oral every 12 hours  mirtazapine 7.5 milliGRAM(s) Oral daily  modafinil 100 milliGRAM(s) Oral daily  pantoprazole    Tablet 40 milliGRAM(s) Oral before breakfast  polyethylene glycol 3350 17 Gram(s) Oral at bedtime  senna 2 Tablet(s) Oral at bedtime  tamsulosin 0.4 milliGRAM(s) Oral at bedtime  tranexamic acid IVPB 1000 milliGRAM(s) IV Intermittent once  tranexamic acid IVPB 1000 milliGRAM(s) IV Intermittent once    MEDICATIONS  (PRN):  ALPRAZolam 0.25 milliGRAM(s) Oral every 12 hours PRN anxiety  aluminum hydroxide/magnesium hydroxide/simethicone Suspension 30 milliLiter(s) Oral every 6 hours PRN Dyspepsia  HYDROmorphone  Injectable 0.25 milliGRAM(s) IV Push every 3 hours PRN breakthrough  magnesium hydroxide Suspension 30 milliLiter(s) Oral daily PRN Constipation  ondansetron Injectable 4 milliGRAM(s) IV Push every 6 hours PRN Nausea and/or Vomiting  oxyCODONE    IR 2.5 milliGRAM(s) Oral every 3 hours PRN Moderate Pain (4 - 6)  oxyCODONE    IR 5 milliGRAM(s) Oral every 3 hours PRN Severe Pain (7 - 10)

## 2023-03-07 NOTE — PROGRESS NOTE ADULT - SUBJECTIVE AND OBJECTIVE BOX
POST OPERATIVE DAY #:   3  STATUS POST: Right Hip ORIF                     SUBJECTIVE: Patient seen and examined. Minimal ambulation with PT, making slow progress. Tolerating diet. Voiding with urinal at bedside. No other complaints and resting in bed.   Reported Pain score = 0/10 at rest and 7/10 with activity. Patient states that the pain medication works but takes a while to take full effect.  Denies: CP/SOB/N/V/Numbness/Tingling    OBJECTIVE:     Vital Signs Last 24 Hrs  T(C): 36.6 (07 Mar 2023 05:26), Max: 37.2 (06 Mar 2023 21:52)  T(F): 97.9 (07 Mar 2023 05:26), Max: 99 (06 Mar 2023 21:52)  HR: 67 (07 Mar 2023 05:26) (55 - 70)  BP: 144/72 (07 Mar 2023 05:26) (88/54 - 151/80)  RR: 18 (07 Mar 2023 05:26) (17 - 18)  SpO2: 92% (07 Mar 2023 05:26) (92% - 97%)    Parameters below as of 07 Mar 2023 05:26  Patient On (Oxygen Delivery Method): room air      Gen: AAOx3, NAD, resting in bed  Abd: soft, NT, ND  Right hip:          [place] Dressing: clean/dry/intact  with no erythema or active discharge appreciated.          Pressure ulcer dressing on right buttock. clean/dry/intact  Bilateral LEs:         Sensation:  intact to light touch          Motor exam:  + dorsiflexion/plantarflexion/EHL         2+ PT pulses          calf supple, NT         SCDs in place    LABS:                        10.0   9.53  )-----------( 215      ( 06 Mar 2023 10:22 )             30.3     03-06    142  |  105  |  27<H>  ----------------------------<  103<H>  3.4<L>   |  31  |  1.16    Ca    8.7      06 Mar 2023 08:22            MEDICATIONS:  Anticoagulation:  aspirin enteric coated 81 milliGRAM(s) Oral daily  enoxaparin Injectable 40 milliGRAM(s) SubCutaneous every 24 hours  tranexamic acid IVPB 1000 milliGRAM(s) IV Intermittent once  tranexamic acid IVPB 1000 milliGRAM(s) IV Intermittent once      Pain medications:   acetaminophen     Tablet .. 1000 milliGRAM(s) Oral every 8 hours  celecoxib 100 milliGRAM(s) Oral every 12 hours  HYDROmorphone  Injectable 0.25 milliGRAM(s) IV Push every 3 hours PRN  oxyCODONE    IR 2.5 milliGRAM(s) Oral every 3 hours PRN  oxyCODONE    IR 5 milliGRAM(s) Oral every 3 hours PRN        A/P :   s/p Right Anterior THR POD #   -    Pain control Acetaminophen, Celebrex, Oxycodone, Dilaudid   -    DVT ppx:  Lovenox 40 SQ daily and ASA81 daily   -    Incentive Spirometry  -    Wound Care consulted for pressure dressing on right buttock. Dressing was appropriate and left intact  -    Cryotherapy with protective barrier on skin  -    Medicine co-managment, primary team is medicine  -    Weight bearing status: WBAT   -    Physical Therapy - continue to make progress to meet goals for Rehab discharge   -    Occupational Therapy- continue for additional progress  -    Discharge plan:  Per Primary Team/Medicine, Recommend [facility] pending progress with PT and pending medical clearance, insurance authorization, bed availability.      POST OPERATIVE DAY #:   3  STATUS POST: Right Hip ORIF                     SUBJECTIVE: Patient seen and examined. Minimal ambulation with PT, making slow progress. Tolerating diet. Voiding with urinal at bedside. No other complaints and resting in bed.   Reported Pain score = 0/10 at rest and 7/10 with activity. Patient states that the pain medication works but takes a while to take full effect.  Denies: CP/SOB/N/V/Numbness/Tingling    OBJECTIVE:     Vital Signs Last 24 Hrs  T(C): 36.6 (07 Mar 2023 05:26), Max: 37.2 (06 Mar 2023 21:52)  T(F): 97.9 (07 Mar 2023 05:26), Max: 99 (06 Mar 2023 21:52)  HR: 67 (07 Mar 2023 05:26) (55 - 70)  BP: 144/72 (07 Mar 2023 05:26) (88/54 - 151/80)  RR: 18 (07 Mar 2023 05:26) (17 - 18)  SpO2: 92% (07 Mar 2023 05:26) (92% - 97%)    Parameters below as of 07 Mar 2023 05:26  Patient On (Oxygen Delivery Method): room air      Gen: AAOx3, NAD, resting in bed  Abd: soft, NT, ND  Right hip:          [place] Dressing: clean/dry/intact  with no erythema or active discharge appreciated.          Pressure ulcer dressing on right buttock. clean/dry/intact  Bilateral LEs:         Sensation:  intact to light touch          Motor exam:  + dorsiflexion/plantarflexion/EHL         2+ PT pulses          calf supple, NT         SCDs in place    LABS:                        10.0   9.53  )-----------( 215      ( 06 Mar 2023 10:22 )             30.3     03-06    142  |  105  |  27<H>  ----------------------------<  103<H>  3.4<L>   |  31  |  1.16    Ca    8.7      06 Mar 2023 08:22            MEDICATIONS:  Anticoagulation:  aspirin enteric coated 81 milliGRAM(s) Oral daily  enoxaparin Injectable 40 milliGRAM(s) SubCutaneous every 24 hours  tranexamic acid IVPB 1000 milliGRAM(s) IV Intermittent once  tranexamic acid IVPB 1000 milliGRAM(s) IV Intermittent once      Pain medications:   acetaminophen     Tablet .. 1000 milliGRAM(s) Oral every 8 hours  celecoxib 100 milliGRAM(s) Oral every 12 hours  HYDROmorphone  Injectable 0.25 milliGRAM(s) IV Push every 3 hours PRN  oxyCODONE    IR 2.5 milliGRAM(s) Oral every 3 hours PRN  oxyCODONE    IR 5 milliGRAM(s) Oral every 3 hours PRN        A/P :   82y M s/p Right Hip ORIF POD # 3,   -    stable  -    Pain control Acetaminophen, Celebrex, Oxycodone, Dilaudid   -    DVT ppx:  Lovenox 40 SQ daily and ASA81 daily   -    Incentive Spirometry  -    Wound Care consulted for pressure dressing on right buttock. Dressing was appropriate and left intact  -    Cryotherapy with protective barrier on skin  -    Medicine co-managment, primary team is medicine  -    Weight bearing status: WBAT   -    Physical Therapy - continue to make progress to meet goals for Rehab discharge   -    Occupational Therapy- continue for additional progress  -    Discharge plan:  Per Primary Team/Medicine, Recommend [facility] pending progress with PT and pending medical clearance, insurance authorization, bed availability.

## 2023-03-07 NOTE — PROGRESS NOTE ADULT - SUBJECTIVE AND OBJECTIVE BOX
Subjective: Patient seen and examined. No overnight events. Pain somewhat better controlled. No overnight events.     MEDICATIONS  (STANDING):  acetaminophen     Tablet .. 1000 milliGRAM(s) Oral every 8 hours  aspirin enteric coated 81 milliGRAM(s) Oral daily  atorvastatin 20 milliGRAM(s) Oral at bedtime  celecoxib 100 milliGRAM(s) Oral every 12 hours  chlorhexidine 2% Cloths 1 Application(s) Topical once  enoxaparin Injectable 40 milliGRAM(s) SubCutaneous every 24 hours  lactated ringers. 1000 milliLiter(s) (100 mL/Hr) IV Continuous <Continuous>  metoprolol tartrate 25 milliGRAM(s) Oral every 12 hours  mirtazapine 7.5 milliGRAM(s) Oral daily  modafinil 100 milliGRAM(s) Oral daily  pantoprazole    Tablet 40 milliGRAM(s) Oral before breakfast  polyethylene glycol 3350 17 Gram(s) Oral at bedtime  senna 2 Tablet(s) Oral at bedtime  tamsulosin 0.4 milliGRAM(s) Oral at bedtime  tranexamic acid IVPB 1000 milliGRAM(s) IV Intermittent once  tranexamic acid IVPB 1000 milliGRAM(s) IV Intermittent once    MEDICATIONS  (PRN):  ALPRAZolam 0.25 milliGRAM(s) Oral every 12 hours PRN anxiety  aluminum hydroxide/magnesium hydroxide/simethicone Suspension 30 milliLiter(s) Oral every 6 hours PRN Dyspepsia  HYDROmorphone  Injectable 0.25 milliGRAM(s) IV Push every 3 hours PRN breakthrough  magnesium hydroxide Suspension 30 milliLiter(s) Oral daily PRN Constipation  ondansetron Injectable 4 milliGRAM(s) IV Push every 6 hours PRN Nausea and/or Vomiting  oxyCODONE    IR 2.5 milliGRAM(s) Oral every 3 hours PRN Moderate Pain (4 - 6)  oxyCODONE    IR 5 milliGRAM(s) Oral every 3 hours PRN Severe Pain (7 - 10)      Allergies    No Known Allergies    Intolerances        Vital Signs Last 24 Hrs  T(C): 36.6 (07 Mar 2023 05:26), Max: 37.2 (06 Mar 2023 21:52)  T(F): 97.9 (07 Mar 2023 05:26), Max: 99 (06 Mar 2023 21:52)  HR: 67 (07 Mar 2023 05:26) (62 - 70)  BP: 144/72 (07 Mar 2023 05:26) (102/64 - 151/80)  BP(mean): --  RR: 18 (07 Mar 2023 05:26) (17 - 18)  SpO2: 92% (07 Mar 2023 05:26) (92% - 97%)    Parameters below as of 07 Mar 2023 05:26  Patient On (Oxygen Delivery Method): room air        PHYSICAL EXAM:  GENERAL: NAD, well-groomed, well-developed  HEAD:  Atraumatic, Normocephalic  ENMT: Moist mucous membranes,   NECK: Supple, No JVD, Normal thyroid  NERVOUS SYSTEM:  All 4 extremities mobile, no gross sensory deficits.   CHEST/LUNG: Clear to auscultation bilaterally; No rales, rhonchi, wheezing, or rubs  HEART: Regular rate and rhythm; No murmurs, rubs, or gallops  ABDOMEN: Soft, Nontender, Nondistended; Bowel sounds present  EXTREMITIES:  2+ Peripheral Pulses, No clubbing, cyanosis, or edema      LABS:                        9.7    9.18  )-----------( 239      ( 07 Mar 2023 08:21 )             29.2     07 Mar 2023 08:21    142    |  105    |  25     ----------------------------<  101    3.7     |  28     |  0.93     Ca    8.4        07 Mar 2023 08:21    TPro  5.5    /  Alb  2.5    /  TBili  0.7    /  DBili  x      /  AST  19     /  ALT  15     /  AlkPhos  66     07 Mar 2023 08:21        CAPILLARY BLOOD GLUCOSE      POCT Blood Glucose.: 105 mg/dL (06 Mar 2023 12:02)      RADIOLOGY & ADDITIONAL TESTS:    Imaging Personally Reviewed:  [ ] YES     Consultant(s) Notes Reviewed:      Care Discussed with Consultants/Other Providers:    Advanced Directives: [ ] DNR  [ ] No feeding tube  [ ] MOLST in chart  [ ] MOLST completed today  [ ] Unknown

## 2023-03-07 NOTE — DIETITIAN INITIAL EVALUATION ADULT - HEIGHT FOR BMI (CENTIMETERS)
SUBJECTIVE:   Salvador Hanson is a 9 y.o. male is brought in by his parent for evaluation of a dry cough for the last week.  He has not had any fevers.  No URI symptoms.  Parent just wants to make sure that this is nothing serious.    OBJECTIVE:   Appears healthy and smiling.  Ears: normal  Eyes: no redness or discharge  Nose: no discharge  Oropharynx: normal  Neck: no adenopathy  Lungs: clear to auscultation, no wheezes or rales and unlabored breathing. Occasional dry cough. No croup sound  Skin: no rash.    Studies: none    ASSESSMENT:   Dry cough that is likely viral in nature.  He is otherwise well-appearing. No wheezing.    PLAN:   May try OTC children's cough syrup if needed.  F/U prn.       
172.72

## 2023-03-07 NOTE — CONSULT NOTE ADULT - SUBJECTIVE AND OBJECTIVE BOX
CARDIOLOGY CONSULT NOTE    Patient is a 82y Male with a known history of :    HPI:  82M Atrial Fibrillation, HTN, HLD, Anxiety and Depression brought in by EMS after having a mechanical fall at his assisted living facility. Patient reports no palpitations, dizziness, light headedness or LOC during this event. Evaluated in the ED and found to have closed fracture of the right femur.  Patient currently received pain medication and resting comfortable.  Of note patient was admitted to our service at this hospital last month for fever and hypertension. Adjustments to his BP medications were made and treated with IV Antibiotics at the time.  (03 Mar 2023 15:37)      REVIEW OF SYSTEMS:    CONSTITUTIONAL: No fever, weight loss, or fatigue  EYES: No eye pain, visual disturbances, or discharge  ENMT:  No difficulty hearing, tinnitus, vertigo; No sinus or throat pain  NECK: No pain or stiffness  BREASTS: No pain, masses, or nipple discharge  RESPIRATORY: No cough, wheezing, chills or hemoptysis; No shortness of breath  CARDIOVASCULAR: No chest pain, palpitations, dizziness, or leg swelling  GASTROINTESTINAL: No abdominal or epigastric pain. No nausea, vomiting, or hematemesis; No diarrhea or constipation. No melena or hematochezia.  GENITOURINARY: No dysuria, frequency, hematuria, or incontinence  NEUROLOGICAL: No headaches, memory loss, loss of strength, numbness, or tremors  SKIN: No itching, burning, rashes, or lesions   LYMPH NODES: No enlarged glands  ENDOCRINE: No heat or cold intolerance; No hair loss  MUSCULOSKELETAL: No joint pain or swelling; No muscle, back, or extremity pain  PSYCHIATRIC: No depression, anxiety, mood swings, or difficulty sleeping  HEME/LYMPH: No easy bruising, or bleeding gums  ALLERGY AND IMMUNOLOGIC: No hives or eczema    MEDICATIONS  (STANDING):  atorvastatin 20 milliGRAM(s) Oral at bedtime  flecainide 100 milliGRAM(s) Oral every 12 hours  metoprolol tartrate 25 milliGRAM(s) Oral every 12 hours  pantoprazole    Tablet 40 milliGRAM(s) Oral before breakfast  tamsulosin 0.4 milliGRAM(s) Oral at bedtime    MEDICATIONS  (PRN):  acetaminophen     Tablet .. 650 milliGRAM(s) Oral every 6 hours PRN Temp greater or equal to 38C (100.4F), Mild Pain (1 - 3)  morphine  - Injectable 2 milliGRAM(s) IV Push every 4 hours PRN Breakthrough  oxyCODONE    IR 5 milliGRAM(s) Oral every 4 hours PRN Moderate Pain (4 - 6)  oxyCODONE    IR 10 milliGRAM(s) Oral every 4 hours PRN Severe Pain (7 - 10)      ALLERGIES: No Known Allergies      FAMILY HISTORY:      Social History:  Alochol:   Smoking:   Drug Use:   Marital Status:     I&O's Detail      PHYSICAL EXAMINATION:  -----------------------------  T(C): 36.8 (03-04-23 @ 05:10), Max: 37 (03-03-23 @ 21:16)  HR: 60 (03-04-23 @ 05:10) (60 - 63)  BP: 137/81 (03-04-23 @ 05:10) (112/62 - 158/72)  RR: 17 (03-04-23 @ 05:10) (16 - 20)  SpO2: 94% (03-04-23 @ 05:10) (92% - 97%)  Wt(kg): --    Height (cm): 172.7 (03-03 @ 13:45)  Weight (kg): 90.7 (03-03 @ 13:45)  BMI (kg/m2): 30.4 (03-03 @ 13:45)  BSA (m2): 2.04 (03-03 @ 13:45)    Constitutional: well developed, normal appearance, well groomed, well nourished, no deformities and no acute distress.   Eyes: the conjunctiva exhibited no abnormalities and the eyelids demonstrated no xanthelasmas.   HEENT: normal oral mucosa, no oral pallor and no oral cyanosis.   Neck: normal jugular venous A waves present, normal jugular venous V waves present and no jugular venous tellez A waves.   Pulmonary: no respiratory distress, normal respiratory rhythm and effort, no accessory muscle use and lungs were clear to auscultation bilaterally. Anteriorly  Cardiovascular: heart rate and rhythm were normal, normal S1 and S2 and no murmur, gallop, rub, heave or thrill are present.   Musculoskeletal: the gait could not be assessed.   Extremities: no clubbing of the fingernails, no localized cyanosis, no petechial hemorrhages and no ischemic changes.   Skin: normal skin color and pigmentation, no rash, no venous stasis, no skin lesions, no skin ulcer and no xanthoma was observed.   Psychiatric: oriented to person, place, and time, the affect was normal, the mood was normal and not feeling anxious.     LABS:   --------  03-04    142  |  104  |  23  ----------------------------<  109<H>  3.5   |  32<H>  |  1.06    Ca    9.0      04 Mar 2023 08:32  Mg     1.9     03-04    TPro  6.8  /  Alb  3.2<L>  /  TBili  0.4  /  DBili  x   /  AST  18  /  ALT  21  /  AlkPhos  79  03-03                         12.5   10.52 )-----------( 284      ( 04 Mar 2023 08:32 )             38.0     PT/INR - ( 03 Mar 2023 14:01 )   PT: 15.5 sec;   INR: 1.34 ratio         PTT - ( 03 Mar 2023 14:01 )  PTT:30.9 sec              RADIOLOGY:  -----------------    < from: US Transthoracic Echocardiogram w/Doppler Complete (01.18.23 @ 09:07) >    ACC: [id number] EXAM:  US TTE W DOPPLER COMPLETE                          PROCEDURE DATE:          INTERPRETATION:  Indication: Hypertension    Technician: Aurora Guy    Study Quality: Fair    Height 5 feet 8 inches, weight 200 pounds, blood pressure 167/76 mmHg    Measurements: Aortic root size 2.9 cm, left atrial size 3.7 cm, right   atrial size 3.9 cm, right ventricular size 2.4 cm, left ventricular   end-diastolic diameter 4.9 cm, left ventricular end-systolic diameter 3.1   cm, septal wall thickness 1.3 cm, posterior thickness 1.3 cm, aortic   velocity 1.8 m/s, mitral E velocity 0.4 m/s, ejection fraction   approximately 55-60%.    Mitral Valve: Mildly thickened mitral valve with normal opening. Trace   mitral regurgitation  Aortic Valve: Thickened aortic valve with normal cusp excursion. Normal   aortic root size. There is mild aortic regurgitation  Left Atrium: Normal size  Left Ventricle: Normal size, mild left ventricular hypertrophy with   normal overall left ventricular systolic function with stage I diastolic   dysfunction  Pericardium: No pericardial effusion  Tricuspid Valve: Appears normal with normal opening. Trace tricuspid   regurgitation with normal right ventricular systolic pressure 23 mmHg.   IVC size 2.0 cmwith more than 50% respiratory variation  Pulmonic Valve: Appears normal, mild pulmonic regurgitation  Right Ventricle: Normal size with normal right ventricular systolic   function  Right Atrium: Normal size    IMPRESSION:  1. Mild left ventricular hypertrophy with normal left ventricular   systolic function with stage I diastolic dysfunction  2. aortic sclerosis, mild aortic regurgitation    --- End of Report ---            [facility]  This document has been electronically signed. Jan 18 2023 10:46AM    < end of copied text >      ECG: NSR, first degree A-V block, RBBB (further widening of QRS)  
    HPI:  82M Atrial Fibrillation, HTN, HLD, Anxiety and Depression brought in by EMS after having a mechanical fall at his assisted living facility. Patient reports no palpitations, dizziness, light headedness or LOC during this event. Evaluated in the ED and found to have closed fracture of the right femur.  Patient currently received pain medication and resting comfortable.  Of note patient was admitted to our service at this hospital last month for fever and hypertension. Adjustments to his BP medications were made and treated with IV Antibiotics at the time.  (03 Mar 2023 15:37)    PAST MEDICAL & SURGICAL HISTORY:  Hypertension      Atrial fibrillation      Asthma      No significant past surgical history      REVIEW OF SYSTEMS  CONSTITUTIONAL: +weakness, no fevers or chills  HEENT: denies blurred visions, sore throat  SKIN: denies new lesions, rash  CARDIOVASCULAR: Denies chest pain, palpitations  RESPIRATORY: denies shortness of breath, sputum production  GASTROINTESTINAL: denies nausea, vomiting, diarrhea, abdominal pain  all other ROS is negative unless indicated above        MEDICATIONS  (STANDING):  acetaminophen     Tablet .. 1000 milliGRAM(s) Oral every 8 hours  aspirin enteric coated 81 milliGRAM(s) Oral daily  atorvastatin 20 milliGRAM(s) Oral at bedtime  celecoxib 100 milliGRAM(s) Oral every 12 hours  chlorhexidine 2% Cloths 1 Application(s) Topical once  enoxaparin Injectable 40 milliGRAM(s) SubCutaneous every 24 hours  lactated ringers. 1000 milliLiter(s) (100 mL/Hr) IV Continuous <Continuous>  metoprolol tartrate 25 milliGRAM(s) Oral every 12 hours  mirtazapine 7.5 milliGRAM(s) Oral daily  modafinil 100 milliGRAM(s) Oral daily  pantoprazole    Tablet 40 milliGRAM(s) Oral before breakfast  polyethylene glycol 3350 17 Gram(s) Oral at bedtime  senna 2 Tablet(s) Oral at bedtime  tamsulosin 0.4 milliGRAM(s) Oral at bedtime  tranexamic acid IVPB 1000 milliGRAM(s) IV Intermittent once  tranexamic acid IVPB 1000 milliGRAM(s) IV Intermittent once    MEDICATIONS  (PRN):  ALPRAZolam 0.25 milliGRAM(s) Oral every 12 hours PRN anxiety  aluminum hydroxide/magnesium hydroxide/simethicone Suspension 30 milliLiter(s) Oral every 6 hours PRN Dyspepsia  HYDROmorphone  Injectable 0.25 milliGRAM(s) IV Push every 3 hours PRN breakthrough  magnesium hydroxide Suspension 30 milliLiter(s) Oral daily PRN Constipation  ondansetron Injectable 4 milliGRAM(s) IV Push every 6 hours PRN Nausea and/or Vomiting  oxyCODONE    IR 2.5 milliGRAM(s) Oral every 3 hours PRN Moderate Pain (4 - 6)  oxyCODONE    IR 5 milliGRAM(s) Oral every 3 hours PRN Severe Pain (7 - 10)      Allergies    No Known Allergies    Intolerances        SOCIAL HISTORY:  lives at [facility]    FAMILY HISTORY:      Vital Signs Last 24 Hrs  T(C): 36.6 (07 Mar 2023 05:26), Max: 37.2 (06 Mar 2023 21:52)  T(F): 97.9 (07 Mar 2023 05:26), Max: 99 (06 Mar 2023 21:52)  HR: 67 (07 Mar 2023 05:26) (62 - 70)  BP: 144/72 (07 Mar 2023 05:26) (102/64 - 151/80)  BP(mean): --  RR: 18 (07 Mar 2023 05:26) (17 - 18)  SpO2: 92% (07 Mar 2023 05:26) (92% - 97%)    Parameters below as of 07 Mar 2023 05:26  Patient On (Oxygen Delivery Method): room air                              9.7    9.18  )-----------( 239      ( 07 Mar 2023 08:21 )             29.2     03-07    142  |  105  |  25<H>  ----------------------------<  101<H>  3.7   |  28  |  0.93    Ca    8.4      07 Mar 2023 08:21    TPro  5.5<L>  /  Alb  2.5<L>  /  TBili  0.7  /  DBili  x   /  AST  19  /  ALT  15  /  AlkPhos  66  03-07          PHYSICAL EXAM:    General: resting comfortably in bed; NAD  ENT: no nasal discharge; hearing intact  Extremities: no clubbing or cyanosis;   Dermatologic: skin warm, dry right hip small 0.3 x 0.5 x 0.2 of unknown etiolgy scant serosanguinous drainage,   No odor, erythema, warmth, tenderness, induration, fluctuance  Neurologic: awake, alert,  Can follow commands  Musculoskeletal/Vascular: no deformities/ contractures

## 2023-03-07 NOTE — PROGRESS NOTE ADULT - ASSESSMENT
82M Atrial Fibrillation, HTN, HLD, Anxiety and Depression admitted for Right Femur Fracture    hypotensive   suspect from opiods  given half liter bolus  mointor Bp    Right Femur Fracture S/P ORIF IM [facility] 3/4/23  Overall slow progressing due to advanced age  BP was soft yesterday but improved and thought to be related to opioids  Continue Bowel and pain control regimen;    Incentive Spirometer for lung expansion;   Monitor Hgb and follow up electrolytes.      Acute Blood Loss Anemia   Most likely perioperative losses;   Assymptomatic; Goal HgB >8.0  Check Iron Studies   EBL Intraoperative was 100cc    Atrial Fibrillation / HTN / HLD   Currently Sinus and has underlying RBBB; Has history of SVT  Hold home dose of Felcainide 100mg BID as per Cardiology  Metoprolol 25mg BID + Statin  No AC and suspect patient is fall risk given the multiple rib fractures     Asthma  Albuterol PRN      Anxiety / Depression  Xanax BID     Diet  Regular      DVT Prophylaxis   SCD    Disposition  Work with PT and will need to make arrangement for [facility]

## 2023-03-07 NOTE — SOCIAL WORK PROGRESS NOTE - NSSWPROGRESSNOTE_GEN_ALL_CORE
Per review of sub-acute rehab referral sent out yesterday (03/06/2023), [facility] in [place] needs to see patient make gains w/ physical therapy in order to be accepted there.  spoke w/ patient's daughter Coleen who requested sub-acute rehab referral also be sent to the [facility] in [place] and [facility] in [place];  sent referral accordingly, and patient was accepted to [facility] pending bed availability on date of discharge, and [facility] is still reviewing the referral. Will continue to follow.

## 2023-03-07 NOTE — DIETITIAN INITIAL EVALUATION ADULT - ETIOLOGY
related to inability to consume sufficient protein/energy secondary to decreased in appetite & increased in protein/energy needs

## 2023-03-08 LAB
ALBUMIN SERPL ELPH-MCNC: 2.4 G/DL — LOW (ref 3.3–5)
ALP SERPL-CCNC: 69 U/L — SIGNIFICANT CHANGE UP (ref 30–120)
ALT FLD-CCNC: 14 U/L DA — SIGNIFICANT CHANGE UP (ref 10–60)
ANION GAP SERPL CALC-SCNC: 7 MMOL/L — SIGNIFICANT CHANGE UP (ref 5–17)
AST SERPL-CCNC: 18 U/L — SIGNIFICANT CHANGE UP (ref 10–40)
BASOPHILS # BLD AUTO: 0.04 K/UL — SIGNIFICANT CHANGE UP (ref 0–0.2)
BASOPHILS NFR BLD AUTO: 0.5 % — SIGNIFICANT CHANGE UP (ref 0–2)
BILIRUB SERPL-MCNC: 0.6 MG/DL — SIGNIFICANT CHANGE UP (ref 0.2–1.2)
BUN SERPL-MCNC: 30 MG/DL — HIGH (ref 7–23)
CALCIUM SERPL-MCNC: 8.8 MG/DL — SIGNIFICANT CHANGE UP (ref 8.4–10.5)
CHLORIDE SERPL-SCNC: 107 MMOL/L — SIGNIFICANT CHANGE UP (ref 96–108)
CO2 SERPL-SCNC: 29 MMOL/L — SIGNIFICANT CHANGE UP (ref 22–31)
CREAT SERPL-MCNC: 0.95 MG/DL — SIGNIFICANT CHANGE UP (ref 0.5–1.3)
EGFR: 79 ML/MIN/1.73M2 — SIGNIFICANT CHANGE UP
EOSINOPHIL # BLD AUTO: 0.27 K/UL — SIGNIFICANT CHANGE UP (ref 0–0.5)
EOSINOPHIL NFR BLD AUTO: 3.1 % — SIGNIFICANT CHANGE UP (ref 0–6)
GLUCOSE SERPL-MCNC: 107 MG/DL — HIGH (ref 70–99)
HCT VFR BLD CALC: 29.8 % — LOW (ref 39–50)
HGB BLD-MCNC: 9.8 G/DL — LOW (ref 13–17)
IMM GRANULOCYTES NFR BLD AUTO: 1 % — HIGH (ref 0–0.9)
LYMPHOCYTES # BLD AUTO: 1.35 K/UL — SIGNIFICANT CHANGE UP (ref 1–3.3)
LYMPHOCYTES # BLD AUTO: 15.4 % — SIGNIFICANT CHANGE UP (ref 13–44)
MCHC RBC-ENTMCNC: 29.2 PG — SIGNIFICANT CHANGE UP (ref 27–34)
MCHC RBC-ENTMCNC: 32.9 GM/DL — SIGNIFICANT CHANGE UP (ref 32–36)
MCV RBC AUTO: 88.7 FL — SIGNIFICANT CHANGE UP (ref 80–100)
MONOCYTES # BLD AUTO: 0.67 K/UL — SIGNIFICANT CHANGE UP (ref 0–0.9)
MONOCYTES NFR BLD AUTO: 7.7 % — SIGNIFICANT CHANGE UP (ref 2–14)
NEUTROPHILS # BLD AUTO: 6.33 K/UL — SIGNIFICANT CHANGE UP (ref 1.8–7.4)
NEUTROPHILS NFR BLD AUTO: 72.3 % — SIGNIFICANT CHANGE UP (ref 43–77)
NRBC # BLD: 0 /100 WBCS — SIGNIFICANT CHANGE UP (ref 0–0)
PLATELET # BLD AUTO: 251 K/UL — SIGNIFICANT CHANGE UP (ref 150–400)
POTASSIUM SERPL-MCNC: 3.9 MMOL/L — SIGNIFICANT CHANGE UP (ref 3.5–5.3)
POTASSIUM SERPL-SCNC: 3.9 MMOL/L — SIGNIFICANT CHANGE UP (ref 3.5–5.3)
PROT SERPL-MCNC: 5.6 G/DL — LOW (ref 6–8.3)
RBC # BLD: 3.36 M/UL — LOW (ref 4.2–5.8)
RBC # FLD: 14.2 % — SIGNIFICANT CHANGE UP (ref 10.3–14.5)
SARS-COV-2 RNA SPEC QL NAA+PROBE: SIGNIFICANT CHANGE UP
SODIUM SERPL-SCNC: 143 MMOL/L — SIGNIFICANT CHANGE UP (ref 135–145)
WBC # BLD: 8.75 K/UL — SIGNIFICANT CHANGE UP (ref 3.8–10.5)
WBC # FLD AUTO: 8.75 K/UL — SIGNIFICANT CHANGE UP (ref 3.8–10.5)

## 2023-03-08 PROCEDURE — 99232 SBSQ HOSP IP/OBS MODERATE 35: CPT

## 2023-03-08 RX ORDER — CELECOXIB 200 MG/1
200 CAPSULE ORAL
Refills: 0 | Status: DISCONTINUED | OUTPATIENT
Start: 2023-03-08 | End: 2023-03-16

## 2023-03-08 RX ORDER — HYDROMORPHONE HYDROCHLORIDE 2 MG/ML
4 INJECTION INTRAMUSCULAR; INTRAVENOUS; SUBCUTANEOUS EVERY 4 HOURS
Refills: 0 | Status: DISCONTINUED | OUTPATIENT
Start: 2023-03-08 | End: 2023-03-09

## 2023-03-08 RX ORDER — HYDROMORPHONE HYDROCHLORIDE 2 MG/ML
2 INJECTION INTRAMUSCULAR; INTRAVENOUS; SUBCUTANEOUS EVERY 4 HOURS
Refills: 0 | Status: DISCONTINUED | OUTPATIENT
Start: 2023-03-08 | End: 2023-03-09

## 2023-03-08 RX ORDER — BUDESONIDE AND FORMOTEROL FUMARATE DIHYDRATE 160; 4.5 UG/1; UG/1
2 AEROSOL RESPIRATORY (INHALATION)
Refills: 0 | Status: DISCONTINUED | OUTPATIENT
Start: 2023-03-08 | End: 2023-03-16

## 2023-03-08 RX ORDER — HYDROCORTISONE 1 %
1 OINTMENT (GRAM) TOPICAL THREE TIMES A DAY
Refills: 0 | Status: DISCONTINUED | OUTPATIENT
Start: 2023-03-08 | End: 2023-03-16

## 2023-03-08 RX ADMIN — Medication 1000 MILLIGRAM(S): at 21:32

## 2023-03-08 RX ADMIN — CELECOXIB 100 MILLIGRAM(S): 200 CAPSULE ORAL at 10:24

## 2023-03-08 RX ADMIN — Medication 25 MILLIGRAM(S): at 05:52

## 2023-03-08 RX ADMIN — Medication 1000 MILLIGRAM(S): at 22:02

## 2023-03-08 RX ADMIN — HYDROMORPHONE HYDROCHLORIDE 2 MILLIGRAM(S): 2 INJECTION INTRAMUSCULAR; INTRAVENOUS; SUBCUTANEOUS at 16:26

## 2023-03-08 RX ADMIN — MODAFINIL 100 MILLIGRAM(S): 200 TABLET ORAL at 05:52

## 2023-03-08 RX ADMIN — Medication 81 MILLIGRAM(S): at 11:03

## 2023-03-08 RX ADMIN — OXYCODONE HYDROCHLORIDE 5 MILLIGRAM(S): 5 TABLET ORAL at 10:23

## 2023-03-08 RX ADMIN — HYDROMORPHONE HYDROCHLORIDE 2 MILLIGRAM(S): 2 INJECTION INTRAMUSCULAR; INTRAVENOUS; SUBCUTANEOUS at 17:26

## 2023-03-08 RX ADMIN — Medication 1 APPLICATION(S): at 21:31

## 2023-03-08 RX ADMIN — MIRTAZAPINE 7.5 MILLIGRAM(S): 45 TABLET, ORALLY DISINTEGRATING ORAL at 11:03

## 2023-03-08 RX ADMIN — ENOXAPARIN SODIUM 40 MILLIGRAM(S): 100 INJECTION SUBCUTANEOUS at 10:22

## 2023-03-08 RX ADMIN — PANTOPRAZOLE SODIUM 40 MILLIGRAM(S): 20 TABLET, DELAYED RELEASE ORAL at 05:52

## 2023-03-08 RX ADMIN — OXYCODONE HYDROCHLORIDE 5 MILLIGRAM(S): 5 TABLET ORAL at 11:24

## 2023-03-08 RX ADMIN — BUDESONIDE AND FORMOTEROL FUMARATE DIHYDRATE 2 PUFF(S): 160; 4.5 AEROSOL RESPIRATORY (INHALATION) at 18:09

## 2023-03-08 RX ADMIN — TAMSULOSIN HYDROCHLORIDE 0.4 MILLIGRAM(S): 0.4 CAPSULE ORAL at 21:31

## 2023-03-08 RX ADMIN — ATORVASTATIN CALCIUM 20 MILLIGRAM(S): 80 TABLET, FILM COATED ORAL at 21:31

## 2023-03-08 RX ADMIN — Medication 1000 MILLIGRAM(S): at 17:26

## 2023-03-08 RX ADMIN — Medication 1000 MILLIGRAM(S): at 05:52

## 2023-03-08 RX ADMIN — CELECOXIB 200 MILLIGRAM(S): 200 CAPSULE ORAL at 17:26

## 2023-03-08 RX ADMIN — Medication 1000 MILLIGRAM(S): at 16:26

## 2023-03-08 RX ADMIN — Medication 25 MILLIGRAM(S): at 18:08

## 2023-03-08 RX ADMIN — CELECOXIB 100 MILLIGRAM(S): 200 CAPSULE ORAL at 11:24

## 2023-03-08 RX ADMIN — Medication 1000 MILLIGRAM(S): at 07:00

## 2023-03-08 RX ADMIN — CELECOXIB 200 MILLIGRAM(S): 200 CAPSULE ORAL at 16:26

## 2023-03-08 NOTE — PROGRESS NOTE ADULT - ASSESSMENT
82M Atrial Fibrillation, HTN, HLD, Anxiety and Depression admitted for Right Femur Fracture    Hypotensive   suspect from opiods  given half liter bolus  mointor Bp    Right Femur Fracture S/P ORIF IM [facility] 3/4/23  Overall slow progressing due to advanced age  BP was soft yesterday but improved and thought to be related to opioids  Continue Bowel and pain control regimen;    Incentive Spirometer for lung expansion;   Monitor Hgb and follow up electrolytes.      Acute Blood Loss Anemia   Most likely perioperative losses;   Assymptomatic; Goal HgB >8.0  Iron studies deficiency and will replete with IV Venofer  EBL Intraoperative was 100cc    Atrial Fibrillation / HTN / HLD   Currently Sinus and has underlying RBBB; Has history of SVT  Hold home dose of Felcainide 100mg BID as per Cardiology  Metoprolol 25mg BID + Statin  No AC and suspect patient is fall risk given the multiple rib fractures     Asthma  Albuterol PRN      Anxiety / Depression  Xanax BID     Diet  Regular      DVT Prophylaxis   SCD    Disposition  Work with PT and will need to make arrangement for [facility] 82M Atrial Fibrillation, HTN, HLD, Anxiety and Depression admitted for Right Femur Fracture    Hypotensive   suspect from opiods  given half liter bolus  mointor Bp    Right Femur Fracture S/P ORIF IM [facility] 3/4/23  Overall slow progressing due to advanced age  BP was soft yesterday but improved and thought to be related to opioids  Continue Bowel and pain control regimen;    Incentive Spirometer for lung expansion;   Monitor Hgb and follow up electrolytes.      Acute Blood Loss Anemia   Most likely perioperative losses;   Assymptomatic; Goal HgB >8.0  Iron studies noted  EBL Intraoperative was 100cc    Atrial Fibrillation / HTN / HLD   Currently Sinus and has underlying RBBB; Has history of SVT  Hold home dose of Felcainide 100mg BID as per Cardiology  Metoprolol 25mg BID + Statin  No AC and suspect patient is fall risk given the multiple rib fractures     Asthma  Albuterol PRN      Anxiety / Depression  Xanax BID     Diet  Regular      DVT Prophylaxis   SCD    Disposition  Work with PT and will need to make arrangement for [facility] 82M Atrial Fibrillation, HTN, HLD, Anxiety and Depression admitted for Right Femur Fracture    Hypotensive   suspect from opiods  given half liter bolus  mointor Bp    Right Femur Fracture S/P ORIF IM [facility] 3/4/23  Overall slow progressing due to advanced age  BP was soft yesterday but improved and thought to be related to opioids  Continue Bowel and pain control regimen;    Incentive Spirometer for lung expansion;   Monitor Hgb and follow up electrolytes.      Acute Blood Loss Anemia   Most likely perioperative losses;   Assymptomatic; Goal HgB >8.0  Iron studies noted  EBL Intraoperative was 100cc    Atrial Fibrillation / HTN / HLD   Currently Sinus and has underlying RBBB; Has history of SVT  Hold home dose of Felcainide 100mg BID as per Cardiology  Metoprolol 25mg BID + Statin  No AC and suspect patient is fall risk given the multiple rib fractures     Asthma  Albuterol PRN      Anxiety / Depression  Xanax BID     Diet  Regular  Severe Protein - Calorie Malnutrtion  Nutrition consulted    DVT Prophylaxis   SCD    Disposition  Work with PT and will need to make arrangement for [facility]

## 2023-03-08 NOTE — PROGRESS NOTE ADULT - ASSESSMENT
The patient is an 82 year old male with a history of paroxysmal atrial fibrillation, HTN, SVT, CVA, dementia who presents with a fall.    Plan:  - ECG with sinus rhythm and underlying RBBB  - Echo 1/23 with normal LV systolic function, no significant valve issues  - Remain off flecainide for now  - Continue metoprolol tartrate 25 mg bid  - Not on anticoagulation for atrial fibrillation - defer to outpatient cardiologist. Presumably there has not been recent episodes of atrial fibrillation.  - Continue aspirin 81 mg daily  - Monitor hemoglobin  - Discharge planning

## 2023-03-08 NOTE — PROGRESS NOTE ADULT - SUBJECTIVE AND OBJECTIVE BOX
Subjective: Patient seen and examined. No overnight events. A bit lethargic with the pain medications     MEDICATIONS  (STANDING):  acetaminophen     Tablet .. 1000 milliGRAM(s) Oral every 8 hours  aspirin enteric coated 81 milliGRAM(s) Oral daily  atorvastatin 20 milliGRAM(s) Oral at bedtime  celecoxib 100 milliGRAM(s) Oral every 12 hours  enoxaparin Injectable 40 milliGRAM(s) SubCutaneous every 24 hours  lactated ringers. 1000 milliLiter(s) (100 mL/Hr) IV Continuous <Continuous>  metoprolol tartrate 25 milliGRAM(s) Oral every 12 hours  mirtazapine 7.5 milliGRAM(s) Oral daily  modafinil 100 milliGRAM(s) Oral daily  pantoprazole    Tablet 40 milliGRAM(s) Oral before breakfast  polyethylene glycol 3350 17 Gram(s) Oral at bedtime  senna 2 Tablet(s) Oral at bedtime  tamsulosin 0.4 milliGRAM(s) Oral at bedtime    MEDICATIONS  (PRN):  ALPRAZolam 0.25 milliGRAM(s) Oral every 12 hours PRN anxiety  aluminum hydroxide/magnesium hydroxide/simethicone Suspension 30 milliLiter(s) Oral every 6 hours PRN Dyspepsia  HYDROmorphone  Injectable 0.25 milliGRAM(s) IV Push every 3 hours PRN breakthrough  magnesium hydroxide Suspension 30 milliLiter(s) Oral daily PRN Constipation  ondansetron Injectable 4 milliGRAM(s) IV Push every 6 hours PRN Nausea and/or Vomiting  oxyCODONE    IR 5 milliGRAM(s) Oral every 3 hours PRN Severe Pain (7 - 10)  oxyCODONE    IR 2.5 milliGRAM(s) Oral every 3 hours PRN Moderate Pain (4 - 6)      Allergies    Allergy Status Unknown    Intolerances    lactose (Diarrhea)      Vital Signs Last 24 Hrs  T(C): 36.9 (08 Mar 2023 05:40), Max: 36.9 (07 Mar 2023 21:51)  T(F): 98.4 (08 Mar 2023 05:40), Max: 98.4 (07 Mar 2023 21:51)  HR: 69 (08 Mar 2023 12:13) (61 - 71)  BP: 166/77 (08 Mar 2023 12:13) (125/78 - 166/77)  BP(mean): --  RR: 18 (08 Mar 2023 05:40) (18 - 18)  SpO2: 92% (08 Mar 2023 05:40) (91% - 93%)    Parameters below as of 08 Mar 2023 05:40  Patient On (Oxygen Delivery Method): room air        PHYSICAL EXAM:  GENERAL: NAD, well-groomed, well-developed  HEAD:  Atraumatic, Normocephalic  ENMT: Moist mucous membranes,   NECK: Supple, No JVD, Normal thyroid  NERVOUS SYSTEM:  All 4 extremities mobile, no gross sensory deficits.   CHEST/LUNG: Clear to auscultation bilaterally; No rales, rhonchi, wheezing, or rubs  HEART: Regular rate and rhythm; No murmurs, rubs, or gallops  ABDOMEN: Soft, Nontender, Nondistended; Bowel sounds present  EXTREMITIES:  2+ Peripheral Pulses, No clubbing, cyanosis, or edema      LABS:                        9.8    8.75  )-----------( 251      ( 08 Mar 2023 07:52 )             29.8     08 Mar 2023 07:52    143    |  107    |  30     ----------------------------<  107    3.9     |  29     |  0.95     Ca    8.8        08 Mar 2023 07:52    TPro  5.6    /  Alb  2.4    /  TBili  0.6    /  DBili  x      /  AST  18     /  ALT  14     /  AlkPhos  69     08 Mar 2023 07:52        CAPILLARY BLOOD GLUCOSE          RADIOLOGY & ADDITIONAL TESTS:    Imaging Personally Reviewed:  [ ] YES     Consultant(s) Notes Reviewed:      Care Discussed with Consultants/Other Providers:    Advanced Directives: [ ] DNR  [ ] No feeding tube  [ ] MOLST in chart  [ ] MOLST completed today  [ ] Unknown

## 2023-03-08 NOTE — PROGRESS NOTE ADULT - SUBJECTIVE AND OBJECTIVE BOX
POST OPERATIVE DAY #:  [4 ]   STATUS POST: [ ] Left [ x] Right hip orif                      Patient is a 82y old  Male who presents with a chief complaint of Per H&P "82M Atrial Fibrillation, HTN, HLD, Anxiety and Depression brought in by EMS after having a mechanical fall at his assisted living facility. Patient reports no palpitations, dizziness, light headedness or LOC during this event. Evaluated in the ED and found to have closed fracture of the right femur.  Patient currently received pain medication and resting comfortable.  Of note patient was admitted to our service at this hospital last month for fever and hypertension. Adjustments to his BP medications were made and treated with IV Antibiotics at the time."      (07 Mar 2023 15:48)        OBJECTIVE:     Vital Signs Last 24 Hrs  T(C): 36.9 (08 Mar 2023 05:40), Max: 36.9 (07 Mar 2023 21:51)  T(F): 98.4 (08 Mar 2023 05:40), Max: 98.4 (07 Mar 2023 21:51)  HR: 61 (08 Mar 2023 05:40) (61 - 71)  BP: 145/77 (08 Mar 2023 05:40) (125/78 - 147/84)  BP(mean): --  RR: 18 (08 Mar 2023 05:40) (18 - 18)  SpO2: 92% (08 Mar 2023 05:40) (91% - 94%)    Parameters below as of 08 Mar 2023 05:40  Patient On (Oxygen Delivery Method): room air        Affected extremity:          [place]   Dressing:  clean/dry/intact  :           Sensation; intact to light touch          Motor exam: Toes warm and mobile         No calf tenderness bilateral LE's    LABS:                        9.7    9.18  )-----------( 239      ( 07 Mar 2023 08:21 )             29.2     03-07    142  |  105  |  25<H>  ----------------------------<  101<H>  3.7   |  28  |  0.93    Ca    8.4      07 Mar 2023 08:21    TPro  5.5<L>  /  Alb  2.5<L>  /  TBili  0.7  /  DBili  x   /  AST  19  /  ALT  15  /  AlkPhos  66  03-07            A/P :    -    Analgesics decreased per medicine(lethargy)  -    DVT ppx: [ x]ASA 81  [ x] Lovenox [ ] Coumadin   [ ] Eliquis   -    Check AM labs  -    Weight bearing status: WBAT [ x]        PWB    [ ]     TTWB  [ ]      NWB  [ ]  -    Physical Therapy  -    Occupational Therapy  -    Dispo: Home [ ]     Rehab [ ]      [facility] [x ]      To be determined [ ]

## 2023-03-08 NOTE — PROGRESS NOTE ADULT - SUBJECTIVE AND OBJECTIVE BOX
Chief Complaint: Fall    Interval Events: No events overnight.    Review of Systems:  General: No fevers, chills, weight gain  Skin: No rashes, color changes  Cardiovascular: No chest pain, orthopnea  Respiratory: No shortness of breath, cough  Gastrointestinal: No nausea, abdominal pain  Genitourinary: No incontinence, pain with urination  Musculoskeletal: No pain, swelling, decreased range of motion  Neurological: No headache, weakness  Psychiatric: No depression, anxiety  Endocrine: No weight gain, increased thirst  All other systems are comprehensively negative.    Physical Exam:  Vital Signs Last 24 Hrs  T(C): 36.9 (08 Mar 2023 05:40), Max: 36.9 (07 Mar 2023 21:51)  T(F): 98.4 (08 Mar 2023 05:40), Max: 98.4 (07 Mar 2023 21:51)  HR: 61 (08 Mar 2023 05:40) (61 - 71)  BP: 145/77 (08 Mar 2023 05:40) (125/78 - 147/84)  BP(mean): --  RR: 18 (08 Mar 2023 05:40) (18 - 18)  SpO2: 92% (08 Mar 2023 05:40) (91% - 94%)  Parameters below as of 08 Mar 2023 05:40  Patient On (Oxygen Delivery Method): room air  General: NAD  HEENT: MMM  Neck: No JVD, no carotid bruit  Lungs: CTAB  CV: RRR, nl S1/S2, no M/R/G  Abdomen: S/NT/ND, +BS  Extremities: No LE edema, no cyanosis  Neuro: AAOx3, non-focal  Skin: No rash    Labs:    03-08    143  |  107  |  30<H>  ----------------------------<  107<H>  3.9   |  29  |  0.95    Ca    8.8      08 Mar 2023 07:52    TPro  5.6<L>  /  Alb  2.4<L>  /  TBili  0.6  /  DBili  x   /  AST  18  /  ALT  14  /  AlkPhos  69  03-08                        9.8    8.75  )-----------( 251      ( 08 Mar 2023 07:52 )             29.8     ECG/Telemetry: Sinus rhythm

## 2023-03-09 PROCEDURE — 99232 SBSQ HOSP IP/OBS MODERATE 35: CPT

## 2023-03-09 RX ORDER — HYDROMORPHONE HYDROCHLORIDE 2 MG/ML
6 INJECTION INTRAMUSCULAR; INTRAVENOUS; SUBCUTANEOUS EVERY 4 HOURS
Refills: 0 | Status: DISCONTINUED | OUTPATIENT
Start: 2023-03-09 | End: 2023-03-16

## 2023-03-09 RX ORDER — HYDROMORPHONE HYDROCHLORIDE 2 MG/ML
4 INJECTION INTRAMUSCULAR; INTRAVENOUS; SUBCUTANEOUS EVERY 4 HOURS
Refills: 0 | Status: DISCONTINUED | OUTPATIENT
Start: 2023-03-09 | End: 2023-03-16

## 2023-03-09 RX ADMIN — BUDESONIDE AND FORMOTEROL FUMARATE DIHYDRATE 2 PUFF(S): 160; 4.5 AEROSOL RESPIRATORY (INHALATION) at 09:26

## 2023-03-09 RX ADMIN — POLYETHYLENE GLYCOL 3350 17 GRAM(S): 17 POWDER, FOR SOLUTION ORAL at 21:24

## 2023-03-09 RX ADMIN — MIRTAZAPINE 7.5 MILLIGRAM(S): 45 TABLET, ORALLY DISINTEGRATING ORAL at 11:42

## 2023-03-09 RX ADMIN — CELECOXIB 200 MILLIGRAM(S): 200 CAPSULE ORAL at 08:41

## 2023-03-09 RX ADMIN — HYDROMORPHONE HYDROCHLORIDE 4 MILLIGRAM(S): 2 INJECTION INTRAMUSCULAR; INTRAVENOUS; SUBCUTANEOUS at 09:26

## 2023-03-09 RX ADMIN — PANTOPRAZOLE SODIUM 40 MILLIGRAM(S): 20 TABLET, DELAYED RELEASE ORAL at 05:19

## 2023-03-09 RX ADMIN — Medication 25 MILLIGRAM(S): at 17:51

## 2023-03-09 RX ADMIN — ATORVASTATIN CALCIUM 20 MILLIGRAM(S): 80 TABLET, FILM COATED ORAL at 21:25

## 2023-03-09 RX ADMIN — Medication 1000 MILLIGRAM(S): at 21:24

## 2023-03-09 RX ADMIN — Medication 81 MILLIGRAM(S): at 11:42

## 2023-03-09 RX ADMIN — CELECOXIB 200 MILLIGRAM(S): 200 CAPSULE ORAL at 21:25

## 2023-03-09 RX ADMIN — CELECOXIB 200 MILLIGRAM(S): 200 CAPSULE ORAL at 09:41

## 2023-03-09 RX ADMIN — HYDROMORPHONE HYDROCHLORIDE 2 MILLIGRAM(S): 2 INJECTION INTRAMUSCULAR; INTRAVENOUS; SUBCUTANEOUS at 05:48

## 2023-03-09 RX ADMIN — TAMSULOSIN HYDROCHLORIDE 0.4 MILLIGRAM(S): 0.4 CAPSULE ORAL at 21:24

## 2023-03-09 RX ADMIN — Medication 1 APPLICATION(S): at 05:18

## 2023-03-09 RX ADMIN — ENOXAPARIN SODIUM 40 MILLIGRAM(S): 100 INJECTION SUBCUTANEOUS at 08:40

## 2023-03-09 RX ADMIN — HYDROMORPHONE HYDROCHLORIDE 4 MILLIGRAM(S): 2 INJECTION INTRAMUSCULAR; INTRAVENOUS; SUBCUTANEOUS at 10:26

## 2023-03-09 RX ADMIN — Medication 25 MILLIGRAM(S): at 05:19

## 2023-03-09 RX ADMIN — Medication 1000 MILLIGRAM(S): at 16:03

## 2023-03-09 RX ADMIN — Medication 1000 MILLIGRAM(S): at 21:54

## 2023-03-09 RX ADMIN — HYDROMORPHONE HYDROCHLORIDE 4 MILLIGRAM(S): 2 INJECTION INTRAMUSCULAR; INTRAVENOUS; SUBCUTANEOUS at 16:03

## 2023-03-09 RX ADMIN — Medication 1000 MILLIGRAM(S): at 05:49

## 2023-03-09 RX ADMIN — MODAFINIL 100 MILLIGRAM(S): 200 TABLET ORAL at 05:19

## 2023-03-09 RX ADMIN — Medication 1000 MILLIGRAM(S): at 05:19

## 2023-03-09 RX ADMIN — Medication 1 APPLICATION(S): at 21:31

## 2023-03-09 RX ADMIN — HYDROMORPHONE HYDROCHLORIDE 4 MILLIGRAM(S): 2 INJECTION INTRAMUSCULAR; INTRAVENOUS; SUBCUTANEOUS at 15:04

## 2023-03-09 RX ADMIN — Medication 1 APPLICATION(S): at 15:04

## 2023-03-09 RX ADMIN — BUDESONIDE AND FORMOTEROL FUMARATE DIHYDRATE 2 PUFF(S): 160; 4.5 AEROSOL RESPIRATORY (INHALATION) at 17:51

## 2023-03-09 RX ADMIN — Medication 1000 MILLIGRAM(S): at 15:03

## 2023-03-09 RX ADMIN — CELECOXIB 200 MILLIGRAM(S): 200 CAPSULE ORAL at 21:54

## 2023-03-09 RX ADMIN — HYDROMORPHONE HYDROCHLORIDE 2 MILLIGRAM(S): 2 INJECTION INTRAMUSCULAR; INTRAVENOUS; SUBCUTANEOUS at 05:18

## 2023-03-09 RX ADMIN — SENNA PLUS 2 TABLET(S): 8.6 TABLET ORAL at 21:25

## 2023-03-09 NOTE — PROGRESS NOTE ADULT - SUBJECTIVE AND OBJECTIVE BOX
Chief Complaint: Fall    Interval Events: No events overnight.    Review of Systems:  General: No fevers, chills, weight gain  Skin: No rashes, color changes  Cardiovascular: No chest pain, orthopnea  Respiratory: No shortness of breath, cough  Gastrointestinal: No nausea, abdominal pain  Genitourinary: No incontinence, pain with urination  Musculoskeletal: No pain, swelling, decreased range of motion  Neurological: No headache, weakness  Psychiatric: No depression, anxiety  Endocrine: No weight gain, increased thirst  All other systems are comprehensively negative.    Physical Exam:  Vital Signs Last 24 Hrs  T(C): 36.6 (09 Mar 2023 04:59), Max: 37.2 (08 Mar 2023 22:02)  T(F): 97.9 (09 Mar 2023 04:59), Max: 99 (08 Mar 2023 22:02)  HR: 71 (09 Mar 2023 04:59) (69 - 84)  BP: 157/75 (09 Mar 2023 04:59) (152/81 - 169/80)  BP(mean): --  RR: 18 (09 Mar 2023 04:59) (16 - 18)  SpO2: 92% (09 Mar 2023 04:59) (91% - 92%)  Parameters below as of 09 Mar 2023 04:59  Patient On (Oxygen Delivery Method): room air  General: NAD  HEENT: MMM  Neck: No JVD, no carotid bruit  Lungs: CTAB  CV: RRR, nl S1/S2, no M/R/G  Abdomen: S/NT/ND, +BS  Extremities: No LE edema, no cyanosis  Neuro: AAOx3, non-focal  Skin: No rash    Labs:    03-08    143  |  107  |  30<H>  ----------------------------<  107<H>  3.9   |  29  |  0.95    Ca    8.8      08 Mar 2023 07:52    TPro  5.6<L>  /  Alb  2.4<L>  /  TBili  0.6  /  DBili  x   /  AST  18  /  ALT  14  /  AlkPhos  69  03-08                        9.8    8.75  )-----------( 251      ( 08 Mar 2023 07:52 )             29.8       ECG/Telemetry: Sinus rhythm

## 2023-03-09 NOTE — PROGRESS NOTE ADULT - SUBJECTIVE AND OBJECTIVE BOX
Subjective: Patient seen and examined.  Pain somewhat better controlled. Tolerated the change to Dialudid that was made.     MEDICATIONS  (STANDING):  acetaminophen     Tablet .. 1000 milliGRAM(s) Oral every 8 hours  aspirin enteric coated 81 milliGRAM(s) Oral daily  atorvastatin 20 milliGRAM(s) Oral at bedtime  budesonide  80 MICROgram(s)/formoterol 4.5 MICROgram(s) Inhaler 2 Puff(s) Inhalation two times a day  celecoxib 200 milliGRAM(s) Oral two times a day  enoxaparin Injectable 40 milliGRAM(s) SubCutaneous every 24 hours  hydrocortisone 1% Cream 1 Application(s) Topical three times a day  lactated ringers. 1000 milliLiter(s) (100 mL/Hr) IV Continuous <Continuous>  metoprolol tartrate 25 milliGRAM(s) Oral every 12 hours  mirtazapine 7.5 milliGRAM(s) Oral daily  modafinil 100 milliGRAM(s) Oral daily  pantoprazole    Tablet 40 milliGRAM(s) Oral before breakfast  polyethylene glycol 3350 17 Gram(s) Oral at bedtime  senna 2 Tablet(s) Oral at bedtime  tamsulosin 0.4 milliGRAM(s) Oral at bedtime    MEDICATIONS  (PRN):  ALPRAZolam 0.25 milliGRAM(s) Oral every 12 hours PRN anxiety  aluminum hydroxide/magnesium hydroxide/simethicone Suspension 30 milliLiter(s) Oral every 6 hours PRN Dyspepsia  clobetasol 0.05% Cream 1 Application(s) Topical two times a day PRN Rash  HYDROmorphone   Tablet 2 milliGRAM(s) Oral every 4 hours PRN Moderate Pain (4 - 6)  HYDROmorphone   Tablet 4 milliGRAM(s) Oral every 4 hours PRN Severe Pain (7 - 10)  HYDROmorphone  Injectable 0.25 milliGRAM(s) IV Push every 3 hours PRN breakthrough  magnesium hydroxide Suspension 30 milliLiter(s) Oral daily PRN Constipation  ondansetron Injectable 4 milliGRAM(s) IV Push every 6 hours PRN Nausea and/or Vomiting      Allergies    Allergy Status Unknown    Intolerances    lactose (Diarrhea)      Vital Signs Last 24 Hrs  T(C): 36.6 (09 Mar 2023 04:59), Max: 37.2 (08 Mar 2023 22:02)  T(F): 97.9 (09 Mar 2023 04:59), Max: 99 (08 Mar 2023 22:02)  HR: 71 (09 Mar 2023 04:59) (69 - 84)  BP: 157/75 (09 Mar 2023 04:59) (152/81 - 169/80)  BP(mean): --  RR: 18 (09 Mar 2023 04:59) (16 - 18)  SpO2: 92% (09 Mar 2023 04:59) (91% - 92%)    Parameters below as of 09 Mar 2023 04:59  Patient On (Oxygen Delivery Method): room air        PHYSICAL EXAM:  GENERAL: NAD, well-groomed, well-developed  HEAD:  Atraumatic, Normocephalic  ENMT: Moist mucous membranes,   NECK: Supple, No JVD, Normal thyroid  NERVOUS SYSTEM:  All 4 extremities mobile, no gross sensory deficits.   CHEST/LUNG: Clear to auscultation bilaterally; No rales, rhonchi, wheezing, or rubs  HEART: Regular rate and rhythm; No murmurs, rubs, or gallops  ABDOMEN: Soft, Nontender, Nondistended; Bowel sounds present  EXTREMITIES:  2+ Peripheral Pulses, No clubbing, cyanosis, or edema      LABS:      Ca    8.8        08 Mar 2023 07:52          CAPILLARY BLOOD GLUCOSE          RADIOLOGY & ADDITIONAL TESTS:    Imaging Personally Reviewed:  [ ] YES     Consultant(s) Notes Reviewed:      Care Discussed with Consultants/Other Providers:    Advanced Directives: [ ] DNR  [ ] No feeding tube  [ ] MOLST in chart  [ ] MOLST completed today  [ ] Unknown

## 2023-03-09 NOTE — PROGRESS NOTE ADULT - SUBJECTIVE AND OBJECTIVE BOX
POST OPERATIVE DAY #: 5    82y Male  s/p  Right  IT fracture ORIF                   SUBJECTIVE: Patient seen and examined at bedside. c/o of Right hip pain, some improvement with current pain regimen.    Pain:  well controlled    Pain scale:   3/10  Denies CP, SOB, N/V/D, weakness, numbness   No new complains     OBJECTIVE:     Vital Signs Last 24 Hrs  T(C): 36.6 (09 Mar 2023 04:59), Max: 37.2 (08 Mar 2023 22:02)  T(F): 97.9 (09 Mar 2023 04:59), Max: 99 (08 Mar 2023 22:02)  HR: 71 (09 Mar 2023 04:59) (69 - 84)  BP: 157/75 (09 Mar 2023 04:59) (152/81 - 169/80)  BP(mean): --  RR: 18 (09 Mar 2023 04:59) (16 - 18)  SpO2: 92% (09 Mar 2023 04:59) (91% - 92%)    Parameters below as of 09 Mar 2023 04:59  Patient On (Oxygen Delivery Method): room air        Affected extremity: RLE         Dressing: silverlon clean/dry/intact                           Sensation: intact to light touch          Motor exam:  5 / 5 Tibialis Anterior/Gastrocnemius-Soleus, EHL/FHL         warm, well-perfused; capillary refill < 3 seconds         negative calf tenderness B/L LE    LABS:                        9.8    8.75  )-----------( 251      ( 08 Mar 2023 07:52 )             29.8     03-08    143  |  107  |  30<H>  ----------------------------<  107<H>  3.9   |  29  |  0.95    Ca    8.8      08 Mar 2023 07:52    TPro  5.6<L>  /  Alb  2.4<L>  /  TBili  0.6  /  DBili  x   /  AST  18  /  ALT  14  /  AlkPhos  69  03-08      I&O's Detail    08 Mar 2023 07:01  -  09 Mar 2023 07:00  --------------------------------------------------------  IN:  Total IN: 0 mL    OUT:    Voided (mL): 1200 mL  Total OUT: 1200 mL    Total NET: -1200 mL          MEDICATIONS:    Pain management:  acetaminophen     Tablet .. 1000 milliGRAM(s) Oral every 8 hours  ALPRAZolam 0.25 milliGRAM(s) Oral every 12 hours PRN  celecoxib 200 milliGRAM(s) Oral two times a day  HYDROmorphone   Tablet 2 milliGRAM(s) Oral every 4 hours PRN  HYDROmorphone   Tablet 4 milliGRAM(s) Oral every 4 hours PRN  HYDROmorphone  Injectable 0.25 milliGRAM(s) IV Push every 3 hours PRN  mirtazapine 7.5 milliGRAM(s) Oral daily  modafinil 100 milliGRAM(s) Oral daily  ondansetron Injectable 4 milliGRAM(s) IV Push every 6 hours PRN    DVT prophylaxis:   aspirin enteric coated 81 milliGRAM(s) Oral daily  enoxaparin Injectable 40 milliGRAM(s) SubCutaneous every 24 hours      RADIOLOGY & ADDITIONAL STUDIES:    ASSESSMENT AND PLAN:     - Analgesic pain control  - DVT prophylaxis: ASA 81 mg daily  Lovenox 40mg daily    SCDs       - Pain Management: Celebrex 200mg twice a day x 21 days   - PT/OT: Weight Bearing Status:  Weight bearing as tolerated, OOBTC       -  Incentive spirometry  - IVF  - Advance diet as tolerated  - Hospitalist is following  -  Follow up labs  -  Disposition:  Subacute Rehab

## 2023-03-09 NOTE — PROGRESS NOTE ADULT - ASSESSMENT
82M Atrial Fibrillation, HTN, HLD, Anxiety and Depression admitted for Right Femur Fracture    Right Femur Fracture S/P ORIF [facility] 3/4/23  Overall slow progressing due to advanced age  BP was soft yesterday but improved and thought to be related to opioids  Pain regimen changed to Dilaudid and BP tolerates; Will increase further to Dilaudid 4mg and 6mg for better control  States that he has spasms but did advise him to start moving and would get better; If not then Valium will be trialed   Incentive Spirometer for lung expansion;   Monitor Hgb and follow up electrolytes.      Acute Blood Loss Anemia   Most likely perioperative losses;   Assymptomatic; Goal HgB >8.0  Iron studies noted  EBL Intraoperative was 100cc    Atrial Fibrillation / HTN / HLD   Currently Sinus and has underlying RBBB; Has history of SVT  Hold home dose of Felcainide 100mg BID as per Cardiology  Metoprolol 25mg BID + Statin  No AC and suspect patient is fall risk given the multiple rib fractures     Asthma  Albuterol PRN      Anxiety / Depression  Xanax BID     Diet  Regular  Severe Protein - Calorie Malnutrtion  Nutrition consulted    DVT Prophylaxis   SCD    Disposition  Work with PT and will need to make arrangement for [facility]

## 2023-03-10 LAB
ANION GAP SERPL CALC-SCNC: 10 MMOL/L — SIGNIFICANT CHANGE UP (ref 5–17)
BUN SERPL-MCNC: 26 MG/DL — HIGH (ref 7–23)
CALCIUM SERPL-MCNC: 9 MG/DL — SIGNIFICANT CHANGE UP (ref 8.4–10.5)
CHLORIDE SERPL-SCNC: 106 MMOL/L — SIGNIFICANT CHANGE UP (ref 96–108)
CO2 SERPL-SCNC: 27 MMOL/L — SIGNIFICANT CHANGE UP (ref 22–31)
CREAT SERPL-MCNC: 0.83 MG/DL — SIGNIFICANT CHANGE UP (ref 0.5–1.3)
EGFR: 87 ML/MIN/1.73M2 — SIGNIFICANT CHANGE UP
GLUCOSE SERPL-MCNC: 96 MG/DL — SIGNIFICANT CHANGE UP (ref 70–99)
HCT VFR BLD CALC: 30.6 % — LOW (ref 39–50)
HGB BLD-MCNC: 10.2 G/DL — LOW (ref 13–17)
MCHC RBC-ENTMCNC: 29.1 PG — SIGNIFICANT CHANGE UP (ref 27–34)
MCHC RBC-ENTMCNC: 33.3 GM/DL — SIGNIFICANT CHANGE UP (ref 32–36)
MCV RBC AUTO: 87.4 FL — SIGNIFICANT CHANGE UP (ref 80–100)
NRBC # BLD: 0 /100 WBCS — SIGNIFICANT CHANGE UP (ref 0–0)
PLATELET # BLD AUTO: 287 K/UL — SIGNIFICANT CHANGE UP (ref 150–400)
POTASSIUM SERPL-MCNC: 4.1 MMOL/L — SIGNIFICANT CHANGE UP (ref 3.5–5.3)
POTASSIUM SERPL-SCNC: 4.1 MMOL/L — SIGNIFICANT CHANGE UP (ref 3.5–5.3)
RBC # BLD: 3.5 M/UL — LOW (ref 4.2–5.8)
RBC # FLD: 13.9 % — SIGNIFICANT CHANGE UP (ref 10.3–14.5)
SODIUM SERPL-SCNC: 143 MMOL/L — SIGNIFICANT CHANGE UP (ref 135–145)
WBC # BLD: 9.14 K/UL — SIGNIFICANT CHANGE UP (ref 3.8–10.5)
WBC # FLD AUTO: 9.14 K/UL — SIGNIFICANT CHANGE UP (ref 3.8–10.5)

## 2023-03-10 PROCEDURE — 99231 SBSQ HOSP IP/OBS SF/LOW 25: CPT

## 2023-03-10 PROCEDURE — 99221 1ST HOSP IP/OBS SF/LOW 40: CPT

## 2023-03-10 RX ORDER — MODAFINIL 200 MG/1
150 TABLET ORAL DAILY
Refills: 0 | Status: DISCONTINUED | OUTPATIENT
Start: 2023-03-10 | End: 2023-03-11

## 2023-03-10 RX ADMIN — Medication 1 APPLICATION(S): at 15:00

## 2023-03-10 RX ADMIN — Medication 1000 MILLIGRAM(S): at 23:30

## 2023-03-10 RX ADMIN — ENOXAPARIN SODIUM 40 MILLIGRAM(S): 100 INJECTION SUBCUTANEOUS at 08:18

## 2023-03-10 RX ADMIN — TAMSULOSIN HYDROCHLORIDE 0.4 MILLIGRAM(S): 0.4 CAPSULE ORAL at 21:56

## 2023-03-10 RX ADMIN — BUDESONIDE AND FORMOTEROL FUMARATE DIHYDRATE 2 PUFF(S): 160; 4.5 AEROSOL RESPIRATORY (INHALATION) at 08:30

## 2023-03-10 RX ADMIN — Medication 1000 MILLIGRAM(S): at 05:27

## 2023-03-10 RX ADMIN — HYDROMORPHONE HYDROCHLORIDE 4 MILLIGRAM(S): 2 INJECTION INTRAMUSCULAR; INTRAVENOUS; SUBCUTANEOUS at 11:49

## 2023-03-10 RX ADMIN — Medication 25 MILLIGRAM(S): at 05:26

## 2023-03-10 RX ADMIN — CELECOXIB 200 MILLIGRAM(S): 200 CAPSULE ORAL at 22:30

## 2023-03-10 RX ADMIN — MIRTAZAPINE 7.5 MILLIGRAM(S): 45 TABLET, ORALLY DISINTEGRATING ORAL at 11:40

## 2023-03-10 RX ADMIN — Medication 81 MILLIGRAM(S): at 11:40

## 2023-03-10 RX ADMIN — CELECOXIB 200 MILLIGRAM(S): 200 CAPSULE ORAL at 21:49

## 2023-03-10 RX ADMIN — Medication 1000 MILLIGRAM(S): at 17:20

## 2023-03-10 RX ADMIN — BUDESONIDE AND FORMOTEROL FUMARATE DIHYDRATE 2 PUFF(S): 160; 4.5 AEROSOL RESPIRATORY (INHALATION) at 19:40

## 2023-03-10 RX ADMIN — MODAFINIL 100 MILLIGRAM(S): 200 TABLET ORAL at 05:26

## 2023-03-10 RX ADMIN — HYDROMORPHONE HYDROCHLORIDE 4 MILLIGRAM(S): 2 INJECTION INTRAMUSCULAR; INTRAVENOUS; SUBCUTANEOUS at 11:09

## 2023-03-10 RX ADMIN — PANTOPRAZOLE SODIUM 40 MILLIGRAM(S): 20 TABLET, DELAYED RELEASE ORAL at 05:26

## 2023-03-10 RX ADMIN — Medication 1000 MILLIGRAM(S): at 05:57

## 2023-03-10 RX ADMIN — ATORVASTATIN CALCIUM 20 MILLIGRAM(S): 80 TABLET, FILM COATED ORAL at 21:50

## 2023-03-10 RX ADMIN — Medication 25 MILLIGRAM(S): at 17:13

## 2023-03-10 RX ADMIN — CELECOXIB 200 MILLIGRAM(S): 200 CAPSULE ORAL at 09:00

## 2023-03-10 RX ADMIN — Medication 1000 MILLIGRAM(S): at 23:11

## 2023-03-10 RX ADMIN — Medication 1 APPLICATION(S): at 21:50

## 2023-03-10 RX ADMIN — POLYETHYLENE GLYCOL 3350 17 GRAM(S): 17 POWDER, FOR SOLUTION ORAL at 21:50

## 2023-03-10 RX ADMIN — Medication 1 APPLICATION(S): at 05:26

## 2023-03-10 RX ADMIN — CELECOXIB 200 MILLIGRAM(S): 200 CAPSULE ORAL at 08:17

## 2023-03-10 RX ADMIN — SENNA PLUS 2 TABLET(S): 8.6 TABLET ORAL at 21:50

## 2023-03-10 RX ADMIN — Medication 1000 MILLIGRAM(S): at 16:38

## 2023-03-10 NOTE — PROGRESS NOTE ADULT - ASSESSMENT
82M Atrial Fibrillation, HTN, HLD, Anxiety and Depression admitted for Right Femur Fracture    Right Femur Fracture S/P ORIF [facility] 3/4/23  Overall slow progressing due to advanced age and lack of motivation; Suspect Psych component   Pain regimen changed to Dilaudid and patient is tolerating increased dosing; Will add valium tonight  Incentive Spirometer for lung expansion;   Monitor Hgb and follow up electrolytes.      Acute Blood Loss Anemia   Most likely perioperative losses;   Assymptomatic; Goal HgB >8.0  Iron studies noted  EBL Intraoperative was 100cc    Atrial Fibrillation / HTN / HLD   Currently Sinus and has underlying RBBB; Has history of SVT  Hold home dose of Felcainide 100mg BID as per Cardiology  Metoprolol 25mg BID + Statin  No AC and suspect patient is fall risk given the multiple rib fractures     Asthma  Albuterol PRN      Anxiety / Depression  Xanax BID PRN   Psych Consulted     Diet  Regular  Severe Protein - Calorie Malnutrtion  Nutrition consulted    DVT Prophylaxis   SCD    Disposition  Work with PT and will need to make arrangement for [facility]

## 2023-03-10 NOTE — BH CONSULTATION LIAISON ASSESSMENT NOTE - NSBHCHARTREVIEWVS_PSY_A_CORE FT
Vital Signs Last 24 Hrs  T(C): 36.7 (10 Mar 2023 13:30), Max: 36.8 (09 Mar 2023 21:20)  T(F): 98 (10 Mar 2023 13:30), Max: 98.3 (09 Mar 2023 21:20)  HR: 71 (10 Mar 2023 13:30) (61 - 75)  BP: 147/74 (10 Mar 2023 13:30) (118/69 - 162/77)  BP(mean): --  RR: 18 (10 Mar 2023 13:30) (17 - 18)  SpO2: 95% (10 Mar 2023 13:30) (91% - 95%)    Parameters below as of 10 Mar 2023 05:10  Patient On (Oxygen Delivery Method): room air

## 2023-03-10 NOTE — BH CONSULTATION LIAISON ASSESSMENT NOTE - NSBHCHARTREVIEWLAB_PSY_A_CORE FT
10.2   9.14  )-----------( 287      ( 10 Mar 2023 07:53 )             30.6   03-10    143  |  106  |  26<H>  ----------------------------<  96  4.1   |  27  |  0.83    Ca    9.0      10 Mar 2023 07:53

## 2023-03-10 NOTE — PROGRESS NOTE ADULT - SUBJECTIVE AND OBJECTIVE BOX
Post Op     SAMANTA BROWN      82y        Male                                                                                                                 T(C): 36.6 (03-10-23 @ 05:10), Max: 36.8 (03-09-23 @ 21:20)  HR: 61 (03-10-23 @ 05:10) (61 - 75)  BP: 118/69 (03-10-23 @ 05:10) (118/69 - 162/77)  RR: 17 (03-10-23 @ 05:10) (16 - 18)  SpO2: 94% (03-10-23 @ 05:10) (91% - 94%)  Wt(kg): --    S/P   open reduction internal fixation right hip    Patient denies shortness of breath, chest pain, dyspnea, No complaints  Pain is 3 /10    Physical Exam    Extremity: Bilaterally:  No holmon                                           No Cord                                          PAS on                                          Neurovascular intact                                          Motor intact EHL/FHL                                          Sensation intact                                          Pulses intact DP/PT                                         Calves Soft                                         Dressing Clean / Dry / Intact siverlon                                         Capillary refill with 5 seconds                          9.8    8.75  )-----------( 251      ( 08 Mar 2023 07:52 )             29.8       03-08    143  |  107  |  30<H>  ----------------------------<  107<H>  3.9   |  29  |  0.95    Ca    8.8      08 Mar 2023 07:52    TPro  5.6<L>  /  Alb  2.4<L>  /  TBili  0.6  /  DBili  x   /  AST  18  /  ALT  14  /  AlkPhos  69  03-08      A/P  -- S/P open reduction internal fixation hip    -  Medicine To Follow   - DVT prophylaxis PAS lovenox  Wound care nurse note appreciated  - PT & OT   - Analagesia  - Incentive Spirometry  - Discharge Planning [facility] vs assisted living ?  - Safety Precautions  -  CBC , BMP daily     Post Op     SAMANTA BROWN      82y        Male                                                                                                                 T(C): 36.6 (03-10-23 @ 05:10), Max: 36.8 (03-09-23 @ 21:20)  HR: 61 (03-10-23 @ 05:10) (61 - 75)  BP: 118/69 (03-10-23 @ 05:10) (118/69 - 162/77)  RR: 17 (03-10-23 @ 05:10) (16 - 18)  SpO2: 94% (03-10-23 @ 05:10) (91% - 94%)  Wt(kg): --    S/P   open reduction internal fixation right hip    Patient denies shortness of breath, chest pain, dyspnea, No complaints  Pain is 3 /10    Physical Exam    Extremity: Bilaterally:  No holmon                                           No Cord                                          PAS on                                          Neurovascular intact                                          Motor intact EHL/FHL                                          Sensation intact                                          Pulses intact DP/PT                                         Calves Soft                                         Dressing Clean / Dry / Intact siverlon                                         Capillary refill with 5 seconds                          9.8    8.75  )-----------( 251      ( 08 Mar 2023 07:52 )             29.8       03-08    143  |  107  |  30<H>  ----------------------------<  107<H>  3.9   |  29  |  0.95    Ca    8.8      08 Mar 2023 07:52    TPro  5.6<L>  /  Alb  2.4<L>  /  TBili  0.6  /  DBili  x   /  AST  18  /  ALT  14  /  AlkPhos  69  03-08      A/P  -- S/P open reduction internal fixation hip    -  Medicine To Follow   - DVT prophylaxis PAS lovenox and asa 81mg po  daily    Wound care nurse note appreciated  - PT & OT   - Analagesia  - Incentive Spirometry  - Discharge Planning [facility] vs assisted living ?  - Safety Precautions  -  CBC , BMP daily

## 2023-03-10 NOTE — BH CONSULTATION LIAISON ASSESSMENT NOTE - NSICDXPASTMEDICALHX_GEN_ALL_CORE_FT
-Steroid injection performed today.  Take it easy over the next few days. Keep in mind that the steroid may take up to 3 days to start working and up to 2 weeks to reach maximal effect.  Ice 15-20 minutes as needed for soreness.  Patient's preferred over the counter medication as needed for pain as directed on packaging.    -At next visit, will plan on repeat x-rays and possibly place a referral to orthopedic surgery to discuss knee replacements.          
PAST MEDICAL HISTORY:  Asthma     Atrial fibrillation     Hypertension

## 2023-03-10 NOTE — BH CONSULTATION LIAISON ASSESSMENT NOTE - HPI (INCLUDE ILLNESS QUALITY, SEVERITY, DURATION, TIMING, CONTEXT, MODIFYING FACTORS, ASSOCIATED SIGNS AND SYMPTOMS)
Patient seen, evaluated and chart reviewed. Patient is an 82 WWM, with no prior psychiatric hospitalizations Atrial Fibrillation, HTN, HLD, Anxiety and Depression brought in by EMS after having a mechanical fall at his assisted living facility. Patient reports no palpitations, dizziness, light headedness or LOC during this event. Evaluated in the ED and found to have closed fracture of the right femur.  Patient currently received pain medication and resting comfortable.  Of note patient was admitted to our service at this hospital last month for fever and hypertension. Adjustments to his BP medications were made and treated with IV Antibiotics at the time. Patient underwent ORIF and following it he has difficulty following recommendations of the PT. Patient has been on Modafinil for extended period of time, multiple trials of antidepressants, unsuccessful.

## 2023-03-10 NOTE — SOCIAL WORK PROGRESS NOTE - NSSWPROGRESSNOTE_GEN_ALL_CORE
Per review of responses from the sub-acute rehab facility choices, [facility] [place] [facility] in [place] would like to see patient make gains w/ P.T./ O.T. before rendering an admission decision, and [facility] in [place] would like to accept patient (pending bed availability on date of discharge) if he is agreeable to paying his daily $200 co-pay (as he has used up his 20 Medicare days where the cost is covered @ 100%.)  unable to proceed w/ discharge planning at this time given his inconsistent progress & participation w/ P.T./ O.T. depts throughout his hospitalization.  provided the above update to Jana, Director of Case Management for [facility], @ [phone]. Will continue to follow.

## 2023-03-10 NOTE — BH CONSULTATION LIAISON ASSESSMENT NOTE - PATIENT'S CHIEF COMPLAINT
I guess I might be depressed, but generally it is difficult for me to do the things they want me to do. I am 82.

## 2023-03-10 NOTE — PROGRESS NOTE ADULT - SUBJECTIVE AND OBJECTIVE BOX
Chief Complaint: Fall    Interval Events: No events overnight.    Review of Systems:  General: No fevers, chills, weight gain  Skin: No rashes, color changes  Cardiovascular: No chest pain, orthopnea  Respiratory: No shortness of breath, cough  Gastrointestinal: No nausea, abdominal pain  Genitourinary: No incontinence, pain with urination  Musculoskeletal: No pain, swelling, decreased range of motion  Neurological: No headache, weakness  Psychiatric: No depression, anxiety  Endocrine: No weight gain, increased thirst  All other systems are comprehensively negative.    Physical Exam:  Vital Signs Last 24 Hrs  T(C): 36.6 (10 Mar 2023 05:10), Max: 36.8 (09 Mar 2023 21:20)  T(F): 97.9 (10 Mar 2023 05:10), Max: 98.3 (09 Mar 2023 21:20)  HR: 61 (10 Mar 2023 05:10) (61 - 75)  BP: 118/69 (10 Mar 2023 05:10) (118/69 - 162/77)  BP(mean): --  RR: 17 (10 Mar 2023 05:10) (16 - 18)  SpO2: 94% (10 Mar 2023 05:10) (91% - 94%)  Parameters below as of 10 Mar 2023 05:10  Patient On (Oxygen Delivery Method): room air  General: NAD  HEENT: MMM  Neck: No JVD, no carotid bruit  Lungs: CTAB  CV: RRR, nl S1/S2, no M/R/G  Abdomen: S/NT/ND, +BS  Extremities: No LE edema, no cyanosis  Neuro: AAOx3, non-focal  Skin: No rash    Labs:    03-10    143  |  106  |  26<H>  ----------------------------<  96  4.1   |  27  |  0.83    Ca    9.0      10 Mar 2023 07:53                          10.2   9.14  )-----------( 287      ( 10 Mar 2023 07:53 )             30.6       ECG/Telemetry: Sinus rhythm

## 2023-03-10 NOTE — BH CONSULTATION LIAISON ASSESSMENT NOTE - CURRENT MEDICATION
MEDICATIONS  (STANDING):  acetaminophen     Tablet .. 1000 milliGRAM(s) Oral every 8 hours  aspirin enteric coated 81 milliGRAM(s) Oral daily  atorvastatin 20 milliGRAM(s) Oral at bedtime  budesonide  80 MICROgram(s)/formoterol 4.5 MICROgram(s) Inhaler 2 Puff(s) Inhalation two times a day  celecoxib 200 milliGRAM(s) Oral two times a day  enoxaparin Injectable 40 milliGRAM(s) SubCutaneous every 24 hours  hydrocortisone 1% Cream 1 Application(s) Topical three times a day  lactated ringers. 1000 milliLiter(s) (100 mL/Hr) IV Continuous <Continuous>  metoprolol tartrate 25 milliGRAM(s) Oral every 12 hours  mirtazapine 7.5 milliGRAM(s) Oral daily  modafinil 150 milliGRAM(s) Oral daily  pantoprazole    Tablet 40 milliGRAM(s) Oral before breakfast  polyethylene glycol 3350 17 Gram(s) Oral at bedtime  senna 2 Tablet(s) Oral at bedtime  tamsulosin 0.4 milliGRAM(s) Oral at bedtime    MEDICATIONS  (PRN):  ALPRAZolam 0.25 milliGRAM(s) Oral every 12 hours PRN anxiety  aluminum hydroxide/magnesium hydroxide/simethicone Suspension 30 milliLiter(s) Oral every 6 hours PRN Dyspepsia  clobetasol 0.05% Cream 1 Application(s) Topical two times a day PRN Rash  HYDROmorphone   Tablet 4 milliGRAM(s) Oral every 4 hours PRN Moderate Pain (4 - 6)  HYDROmorphone   Tablet 6 milliGRAM(s) Oral every 4 hours PRN Severe Pain (7 - 10)  HYDROmorphone  Injectable 0.25 milliGRAM(s) IV Push every 3 hours PRN breakthrough  magnesium hydroxide Suspension 30 milliLiter(s) Oral daily PRN Constipation  ondansetron Injectable 4 milliGRAM(s) IV Push every 6 hours PRN Nausea and/or Vomiting

## 2023-03-10 NOTE — PROGRESS NOTE ADULT - SUBJECTIVE AND OBJECTIVE BOX
Subjective: Patient with flat affect and lacking motiviation to participate with PT.  Pain controlled. Doesn't want to get up out of bed.     MEDICATIONS  (STANDING):  acetaminophen     Tablet .. 1000 milliGRAM(s) Oral every 8 hours  aspirin enteric coated 81 milliGRAM(s) Oral daily  atorvastatin 20 milliGRAM(s) Oral at bedtime  budesonide  80 MICROgram(s)/formoterol 4.5 MICROgram(s) Inhaler 2 Puff(s) Inhalation two times a day  celecoxib 200 milliGRAM(s) Oral two times a day  enoxaparin Injectable 40 milliGRAM(s) SubCutaneous every 24 hours  hydrocortisone 1% Cream 1 Application(s) Topical three times a day  lactated ringers. 1000 milliLiter(s) (100 mL/Hr) IV Continuous <Continuous>  metoprolol tartrate 25 milliGRAM(s) Oral every 12 hours  mirtazapine 7.5 milliGRAM(s) Oral daily  modafinil 100 milliGRAM(s) Oral daily  pantoprazole    Tablet 40 milliGRAM(s) Oral before breakfast  polyethylene glycol 3350 17 Gram(s) Oral at bedtime  senna 2 Tablet(s) Oral at bedtime  tamsulosin 0.4 milliGRAM(s) Oral at bedtime    MEDICATIONS  (PRN):  ALPRAZolam 0.25 milliGRAM(s) Oral every 12 hours PRN anxiety  aluminum hydroxide/magnesium hydroxide/simethicone Suspension 30 milliLiter(s) Oral every 6 hours PRN Dyspepsia  clobetasol 0.05% Cream 1 Application(s) Topical two times a day PRN Rash  HYDROmorphone   Tablet 4 milliGRAM(s) Oral every 4 hours PRN Moderate Pain (4 - 6)  HYDROmorphone   Tablet 6 milliGRAM(s) Oral every 4 hours PRN Severe Pain (7 - 10)  HYDROmorphone  Injectable 0.25 milliGRAM(s) IV Push every 3 hours PRN breakthrough  magnesium hydroxide Suspension 30 milliLiter(s) Oral daily PRN Constipation  ondansetron Injectable 4 milliGRAM(s) IV Push every 6 hours PRN Nausea and/or Vomiting      Allergies    Allergy Status Unknown    Intolerances    lactose (Diarrhea)      Vital Signs Last 24 Hrs  T(C): 36.7 (10 Mar 2023 13:30), Max: 36.8 (09 Mar 2023 21:20)  T(F): 98 (10 Mar 2023 13:30), Max: 98.3 (09 Mar 2023 21:20)  HR: 71 (10 Mar 2023 13:30) (61 - 75)  BP: 147/74 (10 Mar 2023 13:30) (118/69 - 162/77)  BP(mean): --  RR: 18 (10 Mar 2023 13:30) (17 - 18)  SpO2: 95% (10 Mar 2023 13:30) (91% - 95%)    Parameters below as of 10 Mar 2023 05:10  Patient On (Oxygen Delivery Method): room air        PHYSICAL EXAM:  GENERAL: NAD, well-groomed, well-developed  HEAD:  Atraumatic, Normocephalic  ENMT: Moist mucous membranes,   NECK: Supple, No JVD, Normal thyroid  NERVOUS SYSTEM:  All 4 extremities mobile, no gross sensory deficits.   CHEST/LUNG: Clear to auscultation bilaterally; No rales, rhonchi, wheezing, or rubs  HEART: Regular rate and rhythm; No murmurs, rubs, or gallops  ABDOMEN: Soft, Nontender, Nondistended; Bowel sounds present  EXTREMITIES:  2+ Peripheral Pulses, No clubbing, cyanosis, or edema      LABS:                        10.2   9.14  )-----------( 287      ( 10 Mar 2023 07:53 )             30.6     10 Mar 2023 07:53    143    |  106    |  26     ----------------------------<  96     4.1     |  27     |  0.83     Ca    9.0        10 Mar 2023 07:53          CAPILLARY BLOOD GLUCOSE          RADIOLOGY & ADDITIONAL TESTS:    Imaging Personally Reviewed:  [ ] YES     Consultant(s) Notes Reviewed:      Care Discussed with Consultants/Other Providers:    Advanced Directives: [ ] DNR  [ ] No feeding tube  [ ] MOLST in chart  [ ] MOLST completed today  [ ] Unknown

## 2023-03-11 PROCEDURE — 99231 SBSQ HOSP IP/OBS SF/LOW 25: CPT

## 2023-03-11 RX ORDER — MODAFINIL 200 MG/1
100 TABLET ORAL DAILY
Refills: 0 | Status: DISCONTINUED | OUTPATIENT
Start: 2023-03-11 | End: 2023-03-16

## 2023-03-11 RX ADMIN — SENNA PLUS 2 TABLET(S): 8.6 TABLET ORAL at 21:50

## 2023-03-11 RX ADMIN — Medication 25 MILLIGRAM(S): at 17:22

## 2023-03-11 RX ADMIN — ENOXAPARIN SODIUM 40 MILLIGRAM(S): 100 INJECTION SUBCUTANEOUS at 08:31

## 2023-03-11 RX ADMIN — MIRTAZAPINE 7.5 MILLIGRAM(S): 45 TABLET, ORALLY DISINTEGRATING ORAL at 11:56

## 2023-03-11 RX ADMIN — PANTOPRAZOLE SODIUM 40 MILLIGRAM(S): 20 TABLET, DELAYED RELEASE ORAL at 05:24

## 2023-03-11 RX ADMIN — CELECOXIB 200 MILLIGRAM(S): 200 CAPSULE ORAL at 08:31

## 2023-03-11 RX ADMIN — TAMSULOSIN HYDROCHLORIDE 0.4 MILLIGRAM(S): 0.4 CAPSULE ORAL at 21:50

## 2023-03-11 RX ADMIN — Medication 1 APPLICATION(S): at 06:13

## 2023-03-11 RX ADMIN — HYDROMORPHONE HYDROCHLORIDE 4 MILLIGRAM(S): 2 INJECTION INTRAMUSCULAR; INTRAVENOUS; SUBCUTANEOUS at 11:56

## 2023-03-11 RX ADMIN — Medication 1000 MILLIGRAM(S): at 06:24

## 2023-03-11 RX ADMIN — Medication 81 MILLIGRAM(S): at 11:56

## 2023-03-11 RX ADMIN — Medication 1 APPLICATION(S): at 21:51

## 2023-03-11 RX ADMIN — HYDROMORPHONE HYDROCHLORIDE 4 MILLIGRAM(S): 2 INJECTION INTRAMUSCULAR; INTRAVENOUS; SUBCUTANEOUS at 12:48

## 2023-03-11 RX ADMIN — Medication 25 MILLIGRAM(S): at 05:24

## 2023-03-11 RX ADMIN — ATORVASTATIN CALCIUM 20 MILLIGRAM(S): 80 TABLET, FILM COATED ORAL at 21:50

## 2023-03-11 RX ADMIN — CELECOXIB 200 MILLIGRAM(S): 200 CAPSULE ORAL at 21:50

## 2023-03-11 RX ADMIN — POLYETHYLENE GLYCOL 3350 17 GRAM(S): 17 POWDER, FOR SOLUTION ORAL at 21:50

## 2023-03-11 RX ADMIN — BUDESONIDE AND FORMOTEROL FUMARATE DIHYDRATE 2 PUFF(S): 160; 4.5 AEROSOL RESPIRATORY (INHALATION) at 21:49

## 2023-03-11 RX ADMIN — Medication 1 APPLICATION(S): at 21:49

## 2023-03-11 RX ADMIN — MODAFINIL 100 MILLIGRAM(S): 200 TABLET ORAL at 11:56

## 2023-03-11 RX ADMIN — Medication 1000 MILLIGRAM(S): at 22:14

## 2023-03-11 RX ADMIN — HYDROMORPHONE HYDROCHLORIDE 4 MILLIGRAM(S): 2 INJECTION INTRAMUSCULAR; INTRAVENOUS; SUBCUTANEOUS at 21:40

## 2023-03-11 RX ADMIN — CELECOXIB 200 MILLIGRAM(S): 200 CAPSULE ORAL at 09:30

## 2023-03-11 RX ADMIN — HYDROMORPHONE HYDROCHLORIDE 4 MILLIGRAM(S): 2 INJECTION INTRAMUSCULAR; INTRAVENOUS; SUBCUTANEOUS at 20:40

## 2023-03-11 RX ADMIN — Medication 1000 MILLIGRAM(S): at 23:14

## 2023-03-11 RX ADMIN — CELECOXIB 200 MILLIGRAM(S): 200 CAPSULE ORAL at 22:50

## 2023-03-11 RX ADMIN — Medication 1000 MILLIGRAM(S): at 06:15

## 2023-03-11 NOTE — PROGRESS NOTE ADULT - SUBJECTIVE AND OBJECTIVE BOX
Subjective: Patient seen and examined. No overnight events. Seen by Psych and increased medications. Needs to work with PT and stressed importance of this in my conversation.     MEDICATIONS  (STANDING):  acetaminophen     Tablet .. 1000 milliGRAM(s) Oral every 8 hours  aspirin enteric coated 81 milliGRAM(s) Oral daily  atorvastatin 20 milliGRAM(s) Oral at bedtime  budesonide  80 MICROgram(s)/formoterol 4.5 MICROgram(s) Inhaler 2 Puff(s) Inhalation two times a day  celecoxib 200 milliGRAM(s) Oral two times a day  enoxaparin Injectable 40 milliGRAM(s) SubCutaneous every 24 hours  hydrocortisone 1% Cream 1 Application(s) Topical three times a day  lactated ringers. 1000 milliLiter(s) (100 mL/Hr) IV Continuous <Continuous>  metoprolol tartrate 25 milliGRAM(s) Oral every 12 hours  mirtazapine 7.5 milliGRAM(s) Oral daily  modafinil 150 milliGRAM(s) Oral daily  pantoprazole    Tablet 40 milliGRAM(s) Oral before breakfast  polyethylene glycol 3350 17 Gram(s) Oral at bedtime  senna 2 Tablet(s) Oral at bedtime  tamsulosin 0.4 milliGRAM(s) Oral at bedtime    MEDICATIONS  (PRN):  ALPRAZolam 0.25 milliGRAM(s) Oral every 12 hours PRN anxiety  aluminum hydroxide/magnesium hydroxide/simethicone Suspension 30 milliLiter(s) Oral every 6 hours PRN Dyspepsia  clobetasol 0.05% Cream 1 Application(s) Topical two times a day PRN Rash  HYDROmorphone   Tablet 4 milliGRAM(s) Oral every 4 hours PRN Moderate Pain (4 - 6)  HYDROmorphone   Tablet 6 milliGRAM(s) Oral every 4 hours PRN Severe Pain (7 - 10)  HYDROmorphone  Injectable 0.25 milliGRAM(s) IV Push every 3 hours PRN breakthrough  magnesium hydroxide Suspension 30 milliLiter(s) Oral daily PRN Constipation  ondansetron Injectable 4 milliGRAM(s) IV Push every 6 hours PRN Nausea and/or Vomiting      Allergies    Allergy Status Unknown    Intolerances    lactose (Diarrhea)      Vital Signs Last 24 Hrs  T(C): 36.6 (11 Mar 2023 04:58), Max: 36.9 (10 Mar 2023 17:05)  T(F): 97.9 (11 Mar 2023 04:58), Max: 98.4 (10 Mar 2023 17:05)  HR: 73 (11 Mar 2023 04:58) (71 - 74)  BP: 163/79 (11 Mar 2023 04:58) (140/78 - 163/79)  BP(mean): --  RR: 18 (11 Mar 2023 04:58) (17 - 18)  SpO2: 93% (11 Mar 2023 04:58) (90% - 95%)    Parameters below as of 11 Mar 2023 04:58  Patient On (Oxygen Delivery Method): room air        PHYSICAL EXAM:  GENERAL: NAD, well-groomed, well-developed  HEAD:  Atraumatic, Normocephalic  ENMT: Moist mucous membranes,   NECK: Supple, No JVD, Normal thyroid  NERVOUS SYSTEM:  All 4 extremities mobile, no gross sensory deficits.   CHEST/LUNG: Clear to auscultation bilaterally; No rales, rhonchi, wheezing, or rubs  HEART: Regular rate and rhythm; No murmurs, rubs, or gallops  ABDOMEN: Soft, Nontender, Nondistended; Bowel sounds present  EXTREMITIES:  2+ Peripheral Pulses, No clubbing, cyanosis, or edema      LABS:      Ca    9.0        10 Mar 2023 07:53          CAPILLARY BLOOD GLUCOSE          RADIOLOGY & ADDITIONAL TESTS:    Imaging Personally Reviewed:  [ ] YES     Consultant(s) Notes Reviewed:      Care Discussed with Consultants/Other Providers:    Advanced Directives: [ ] DNR  [ ] No feeding tube  [ ] MOLST in chart  [ ] MOLST completed today  [ ] Unknown

## 2023-03-11 NOTE — PROGRESS NOTE ADULT - SUBJECTIVE AND OBJECTIVE BOX
Chief Complaint: Fall    Interval Events: No events overnight.    Review of Systems:  General: No fevers, chills, weight gain  Skin: No rashes, color changes  Cardiovascular: No chest pain, orthopnea  Respiratory: No shortness of breath, cough  Gastrointestinal: No nausea, abdominal pain  Genitourinary: No incontinence, pain with urination  Musculoskeletal: No pain, swelling, decreased range of motion  Neurological: No headache, weakness  Psychiatric: No depression, anxiety  Endocrine: No weight gain, increased thirst  All other systems are comprehensively negative.    Physical Exam:  Vital Signs Last 24 Hrs  T(C): 36.6 (11 Mar 2023 04:58), Max: 36.9 (10 Mar 2023 17:05)  T(F): 97.9 (11 Mar 2023 04:58), Max: 98.4 (10 Mar 2023 17:05)  HR: 73 (11 Mar 2023 04:58) (71 - 74)  BP: 163/79 (11 Mar 2023 04:58) (140/78 - 163/79)  BP(mean): --  RR: 18 (11 Mar 2023 04:58) (17 - 18)  SpO2: 93% (11 Mar 2023 04:58) (90% - 95%)  Parameters below as of 11 Mar 2023 04:58  Patient On (Oxygen Delivery Method): room air  General: NAD  HEENT: MMM  Neck: No JVD, no carotid bruit  Lungs: CTAB  CV: RRR, nl S1/S2, no M/R/G  Abdomen: S/NT/ND, +BS  Extremities: No LE edema, no cyanosis  Neuro: AAOx3, non-focal  Skin: No rash    Labs:    03-10    143  |  106  |  26<H>  ----------------------------<  96  4.1   |  27  |  0.83    Ca    9.0      10 Mar 2023 07:53                          10.2   9.14  )-----------( 287      ( 10 Mar 2023 07:53 )             30.6     ECG/Telemetry: Sinus rhythm

## 2023-03-11 NOTE — PROGRESS NOTE ADULT - SUBJECTIVE AND OBJECTIVE BOX
Post Op Day #7    SUBJECTIVE    83yo Male status post ORIF Right Hip .   Patient is alert and comfortable.    Pain is controlled with current pain regimen.  Denies nausea, vomiting, chest pain, shortness of breath, abdominal pain or fever.   No new complaints.    OBJECTIVE    Vital Signs Last 24 Hrs  T(C): 36.7 (11 Mar 2023 11:44), Max: 36.9 (10 Mar 2023 17:05)  T(F): 98.1 (11 Mar 2023 11:44), Max: 98.4 (10 Mar 2023 17:05)  HR: 76 (11 Mar 2023 11:44) (71 - 76)  BP: 163/91 (11 Mar 2023 11:44) (140/78 - 163/91)  BP(mean): --  RR: 18 (11 Mar 2023 11:44) (17 - 18)  SpO2: 94% (11 Mar 2023 11:44) (90% - 95%)    Parameters below as of 11 Mar 2023 11:44  Patient On (Oxygen Delivery Method): room air      I&O's Summary    10 Mar 2023 07:01  -  11 Mar 2023 07:00  --------------------------------------------------------  IN: 0 mL / OUT: 650 mL / NET: -650 mL        PHYSICAL EXAM    Right Hip Silverlon dressing removed. Incision is clean and dry with staples intact.   No erythema/ No exudate/ No blistering/ No ecchymosis.   The calf is supple/nontender.   Sensation to light touch is grossly intact distally.   Motor function distally is intact.   No foot drop.   (2+) dorsalis pedis pulse. Capillary refill is less than 2 seconds. No cyanosis.                          10.2<L>  9.14  )-----------( 287      ( 10 Mar 2023 07:53 )             30.6<L>  10 Mar 2023 07:53    10 Mar 2023 07:53    143    |  106    |  26<H>  ----------------------------<  96     4.1     |  27     |  0.83     Ca    9.0        10 Mar 2023 07:53        ASSESSMENT AND PLAN    - Orthopedically stable  - DVT prophylaxis: PAS + Lovenox 40mg daily  - Continue physical therapy and occupational therapy  - Weight bearing as tolerated of the right lower extremity with assistance of a walker  - Incentive spirometry encouraged  - Pain control as clinically indicated  - Disposition: subacute rehabilitation

## 2023-03-12 PROCEDURE — 99231 SBSQ HOSP IP/OBS SF/LOW 25: CPT

## 2023-03-12 RX ADMIN — HYDROMORPHONE HYDROCHLORIDE 4 MILLIGRAM(S): 2 INJECTION INTRAMUSCULAR; INTRAVENOUS; SUBCUTANEOUS at 12:34

## 2023-03-12 RX ADMIN — Medication 1000 MILLIGRAM(S): at 23:03

## 2023-03-12 RX ADMIN — CELECOXIB 200 MILLIGRAM(S): 200 CAPSULE ORAL at 08:39

## 2023-03-12 RX ADMIN — Medication 1 APPLICATION(S): at 22:02

## 2023-03-12 RX ADMIN — CELECOXIB 200 MILLIGRAM(S): 200 CAPSULE ORAL at 22:03

## 2023-03-12 RX ADMIN — PANTOPRAZOLE SODIUM 40 MILLIGRAM(S): 20 TABLET, DELAYED RELEASE ORAL at 05:47

## 2023-03-12 RX ADMIN — TAMSULOSIN HYDROCHLORIDE 0.4 MILLIGRAM(S): 0.4 CAPSULE ORAL at 22:03

## 2023-03-12 RX ADMIN — Medication 25 MILLIGRAM(S): at 05:47

## 2023-03-12 RX ADMIN — Medication 1 APPLICATION(S): at 05:47

## 2023-03-12 RX ADMIN — CELECOXIB 200 MILLIGRAM(S): 200 CAPSULE ORAL at 23:03

## 2023-03-12 RX ADMIN — SENNA PLUS 2 TABLET(S): 8.6 TABLET ORAL at 22:03

## 2023-03-12 RX ADMIN — POLYETHYLENE GLYCOL 3350 17 GRAM(S): 17 POWDER, FOR SOLUTION ORAL at 22:02

## 2023-03-12 RX ADMIN — MIRTAZAPINE 7.5 MILLIGRAM(S): 45 TABLET, ORALLY DISINTEGRATING ORAL at 12:34

## 2023-03-12 RX ADMIN — HYDROMORPHONE HYDROCHLORIDE 4 MILLIGRAM(S): 2 INJECTION INTRAMUSCULAR; INTRAVENOUS; SUBCUTANEOUS at 13:31

## 2023-03-12 RX ADMIN — Medication 81 MILLIGRAM(S): at 12:34

## 2023-03-12 RX ADMIN — Medication 1000 MILLIGRAM(S): at 07:08

## 2023-03-12 RX ADMIN — CELECOXIB 200 MILLIGRAM(S): 200 CAPSULE ORAL at 09:30

## 2023-03-12 RX ADMIN — ENOXAPARIN SODIUM 40 MILLIGRAM(S): 100 INJECTION SUBCUTANEOUS at 08:38

## 2023-03-12 RX ADMIN — Medication 1 APPLICATION(S): at 14:25

## 2023-03-12 RX ADMIN — MODAFINIL 100 MILLIGRAM(S): 200 TABLET ORAL at 05:47

## 2023-03-12 RX ADMIN — ATORVASTATIN CALCIUM 20 MILLIGRAM(S): 80 TABLET, FILM COATED ORAL at 22:03

## 2023-03-12 RX ADMIN — BUDESONIDE AND FORMOTEROL FUMARATE DIHYDRATE 2 PUFF(S): 160; 4.5 AEROSOL RESPIRATORY (INHALATION) at 20:15

## 2023-03-12 RX ADMIN — Medication 25 MILLIGRAM(S): at 17:20

## 2023-03-12 RX ADMIN — Medication 1000 MILLIGRAM(S): at 22:03

## 2023-03-12 RX ADMIN — Medication 1000 MILLIGRAM(S): at 06:08

## 2023-03-12 RX ADMIN — BUDESONIDE AND FORMOTEROL FUMARATE DIHYDRATE 2 PUFF(S): 160; 4.5 AEROSOL RESPIRATORY (INHALATION) at 06:08

## 2023-03-12 NOTE — PROGRESS NOTE ADULT - SUBJECTIVE AND OBJECTIVE BOX
Subjective: Patient seen and examined. No overnight events. Not motivated.     MEDICATIONS  (STANDING):  acetaminophen     Tablet .. 1000 milliGRAM(s) Oral every 8 hours  aspirin enteric coated 81 milliGRAM(s) Oral daily  atorvastatin 20 milliGRAM(s) Oral at bedtime  budesonide  80 MICROgram(s)/formoterol 4.5 MICROgram(s) Inhaler 2 Puff(s) Inhalation two times a day  celecoxib 200 milliGRAM(s) Oral two times a day  enoxaparin Injectable 40 milliGRAM(s) SubCutaneous every 24 hours  hydrocortisone 1% Cream 1 Application(s) Topical three times a day  lactated ringers. 1000 milliLiter(s) (100 mL/Hr) IV Continuous <Continuous>  metoprolol tartrate 25 milliGRAM(s) Oral every 12 hours  mirtazapine 7.5 milliGRAM(s) Oral daily  modafinil 100 milliGRAM(s) Oral daily  pantoprazole    Tablet 40 milliGRAM(s) Oral before breakfast  polyethylene glycol 3350 17 Gram(s) Oral at bedtime  senna 2 Tablet(s) Oral at bedtime  tamsulosin 0.4 milliGRAM(s) Oral at bedtime    MEDICATIONS  (PRN):  aluminum hydroxide/magnesium hydroxide/simethicone Suspension 30 milliLiter(s) Oral every 6 hours PRN Dyspepsia  clobetasol 0.05% Cream 1 Application(s) Topical two times a day PRN Rash  HYDROmorphone   Tablet 4 milliGRAM(s) Oral every 4 hours PRN Moderate Pain (4 - 6)  HYDROmorphone   Tablet 6 milliGRAM(s) Oral every 4 hours PRN Severe Pain (7 - 10)  magnesium hydroxide Suspension 30 milliLiter(s) Oral daily PRN Constipation  ondansetron Injectable 4 milliGRAM(s) IV Push every 6 hours PRN Nausea and/or Vomiting      Allergies    Allergy Status Unknown    Intolerances    lactose (Diarrhea)      Vital Signs Last 24 Hrs  T(C): 36.9 (12 Mar 2023 12:36), Max: 37.1 (11 Mar 2023 17:36)  T(F): 98.4 (12 Mar 2023 12:36), Max: 98.7 (11 Mar 2023 17:36)  HR: 68 (12 Mar 2023 12:36) (68 - 79)  BP: 159/83 (12 Mar 2023 12:36) (134/82 - 170/85)  BP(mean): --  RR: 18 (12 Mar 2023 12:36) (16 - 18)  SpO2: 93% (12 Mar 2023 12:36) (91% - 93%)    Parameters below as of 12 Mar 2023 12:36  Patient On (Oxygen Delivery Method): room air        PHYSICAL EXAM:  GENERAL: NAD, well-groomed, well-developed  HEAD:  Atraumatic, Normocephalic  ENMT: Moist mucous membranes,   NECK: Supple, No JVD, Normal thyroid  NERVOUS SYSTEM:  All 4 extremities mobile, no gross sensory deficits.   CHEST/LUNG: Clear to auscultation bilaterally; No rales, rhonchi, wheezing, or rubs  HEART: Regular rate and rhythm; No murmurs, rubs, or gallops  ABDOMEN: Soft, Nontender, Nondistended; Bowel sounds present  EXTREMITIES:  2+ Peripheral Pulses, No clubbing, cyanosis, or edema      LABS:              CAPILLARY BLOOD GLUCOSE          RADIOLOGY & ADDITIONAL TESTS:    Imaging Personally Reviewed:  [ ] YES     Consultant(s) Notes Reviewed:      Care Discussed with Consultants/Other Providers:    Advanced Directives: [ ] DNR  [ ] No feeding tube  [ ] MOLST in chart  [ ] MOLST completed today  [ ] Unknown

## 2023-03-12 NOTE — PROGRESS NOTE ADULT - SUBJECTIVE AND OBJECTIVE BOX
ORTHOPEDIC PA PROGRESS NOTE  SAMANTA BROWN      82y Male                                 SY 1EST 108 W1                                                                                                                           POD #    9d    STATUS POST:       Procedure: Open reduction and internal fixation (ORIF) of fracture of right hip using intramedullary raffi               Patient seen and examined at bedside.      Current Pain Management:    acetaminophen     Tablet .. 1000 milliGRAM(s) Oral every 8 hours  celecoxib 200 milliGRAM(s) Oral two times a day  HYDROmorphone   Tablet 4 milliGRAM(s) Oral every 4 hours PRN  HYDROmorphone   Tablet 6 milliGRAM(s) Oral every 4 hours PRN  mirtazapine 7.5 milliGRAM(s) Oral daily  modafinil 100 milliGRAM(s) Oral daily  ondansetron Injectable 4 milliGRAM(s) IV Push every 6 hours PRN      T(F): 98.5  HR: 76  BP: 134/82  RR: 18  SpO2: 92%               03-11-23 @ 06:01  -  03-12-23 @ 07:00  --------------------------------------------------------  IN:  Total IN: 0 mL    OUT:    Voided (mL): 450 mL  Total OUT: 450 mL    Total NET: -450 mL        Physical Exam :    -   Wound C/D/I.   -   Distal Neurvascular status intact grossly.   -   Warm well perfused; capillary refill <3 seconds   -   (+)EHL/FHL   -   (+) Sensation to light touch  -   (-) Calf tenderness Bilaterally      A/P: 82y Male s/p Open reduction and internal fixation (ORIF) of fracture of right hip using intramedullary raffi       -   Ortho Stable  -   Pain control:  acetaminophen     Tablet .. 1000 milliGRAM(s) Oral every 8 hours  celecoxib 200 milliGRAM(s) Oral two times a day  HYDROmorphone   Tablet 4 milliGRAM(s) Oral every 4 hours PRN  HYDROmorphone   Tablet 6 milliGRAM(s) Oral every 4 hours PRN  mirtazapine 7.5 milliGRAM(s) Oral daily  modafinil 100 milliGRAM(s) Oral daily  ondansetron Injectable 4 milliGRAM(s) IV Push every 6 hours PRN    -   Medicine to follow  -   DVT ppx:    PAS +  enoxaparin Injectable: 40 milliGRAM(s) SubCutaneous, aspirin enteric coated: 81 milliGRAM(s) Oral  -   PT/OT OOB,  Weight bearing status: WBAT

## 2023-03-12 NOTE — PROGRESS NOTE ADULT - SUBJECTIVE AND OBJECTIVE BOX
Chief Complaint: Fall    Interval Events: No events overnight.    Review of Systems:  General: No fevers, chills, weight gain  Skin: No rashes, color changes  Cardiovascular: No chest pain, orthopnea  Respiratory: No shortness of breath, cough  Gastrointestinal: No nausea, abdominal pain  Genitourinary: No incontinence, pain with urination  Musculoskeletal: No pain, swelling, decreased range of motion  Neurological: No headache, weakness  Psychiatric: No depression, anxiety  Endocrine: No weight gain, increased thirst  All other systems are comprehensively negative.    Physical Exam:  Vital Signs Last 24 Hrs  T(C): 36.9 (12 Mar 2023 05:41), Max: 37.1 (11 Mar 2023 17:36)  T(F): 98.5 (12 Mar 2023 05:41), Max: 98.7 (11 Mar 2023 17:36)  HR: 76 (12 Mar 2023 05:41) (74 - 79)  BP: 134/82 (12 Mar 2023 05:41) (134/82 - 170/85)  BP(mean): --  RR: 18 (12 Mar 2023 05:41) (16 - 18)  SpO2: 92% (12 Mar 2023 05:41) (91% - 94%)  Parameters below as of 12 Mar 2023 05:41  Patient On (Oxygen Delivery Method): room air  General: NAD  HEENT: MMM  Neck: No JVD, no carotid bruit  Lungs: CTAB  CV: RRR, nl S1/S2, no M/R/G  Abdomen: S/NT/ND, +BS  Extremities: No LE edema, no cyanosis  Neuro: AAOx3, non-focal  Skin: No rash    Labs:      ECG/Telemetry: Sinus rhythm

## 2023-03-13 PROCEDURE — 99231 SBSQ HOSP IP/OBS SF/LOW 25: CPT

## 2023-03-13 RX ORDER — LIDOCAINE 4 G/100G
1 CREAM TOPICAL EVERY 24 HOURS
Refills: 0 | Status: DISCONTINUED | OUTPATIENT
Start: 2023-03-13 | End: 2023-03-16

## 2023-03-13 RX ADMIN — Medication 1 APPLICATION(S): at 05:48

## 2023-03-13 RX ADMIN — SENNA PLUS 2 TABLET(S): 8.6 TABLET ORAL at 21:00

## 2023-03-13 RX ADMIN — CELECOXIB 200 MILLIGRAM(S): 200 CAPSULE ORAL at 09:15

## 2023-03-13 RX ADMIN — HYDROMORPHONE HYDROCHLORIDE 4 MILLIGRAM(S): 2 INJECTION INTRAMUSCULAR; INTRAVENOUS; SUBCUTANEOUS at 10:10

## 2023-03-13 RX ADMIN — ENOXAPARIN SODIUM 40 MILLIGRAM(S): 100 INJECTION SUBCUTANEOUS at 09:15

## 2023-03-13 RX ADMIN — PANTOPRAZOLE SODIUM 40 MILLIGRAM(S): 20 TABLET, DELAYED RELEASE ORAL at 05:47

## 2023-03-13 RX ADMIN — MIRTAZAPINE 7.5 MILLIGRAM(S): 45 TABLET, ORALLY DISINTEGRATING ORAL at 11:59

## 2023-03-13 RX ADMIN — Medication 25 MILLIGRAM(S): at 17:59

## 2023-03-13 RX ADMIN — BUDESONIDE AND FORMOTEROL FUMARATE DIHYDRATE 2 PUFF(S): 160; 4.5 AEROSOL RESPIRATORY (INHALATION) at 05:48

## 2023-03-13 RX ADMIN — HYDROMORPHONE HYDROCHLORIDE 4 MILLIGRAM(S): 2 INJECTION INTRAMUSCULAR; INTRAVENOUS; SUBCUTANEOUS at 09:15

## 2023-03-13 RX ADMIN — Medication 1000 MILLIGRAM(S): at 15:40

## 2023-03-13 RX ADMIN — Medication 1 APPLICATION(S): at 21:01

## 2023-03-13 RX ADMIN — ATORVASTATIN CALCIUM 20 MILLIGRAM(S): 80 TABLET, FILM COATED ORAL at 21:00

## 2023-03-13 RX ADMIN — LIDOCAINE 1 PATCH: 4 CREAM TOPICAL at 19:00

## 2023-03-13 RX ADMIN — Medication 1000 MILLIGRAM(S): at 06:33

## 2023-03-13 RX ADMIN — Medication 81 MILLIGRAM(S): at 11:59

## 2023-03-13 RX ADMIN — Medication 25 MILLIGRAM(S): at 05:47

## 2023-03-13 RX ADMIN — CELECOXIB 200 MILLIGRAM(S): 200 CAPSULE ORAL at 10:10

## 2023-03-13 RX ADMIN — MODAFINIL 100 MILLIGRAM(S): 200 TABLET ORAL at 05:47

## 2023-03-13 RX ADMIN — Medication 1000 MILLIGRAM(S): at 14:40

## 2023-03-13 RX ADMIN — Medication 1000 MILLIGRAM(S): at 07:30

## 2023-03-13 RX ADMIN — CELECOXIB 200 MILLIGRAM(S): 200 CAPSULE ORAL at 21:00

## 2023-03-13 RX ADMIN — LIDOCAINE 1 PATCH: 4 CREAM TOPICAL at 17:58

## 2023-03-13 RX ADMIN — CELECOXIB 200 MILLIGRAM(S): 200 CAPSULE ORAL at 21:30

## 2023-03-13 RX ADMIN — TAMSULOSIN HYDROCHLORIDE 0.4 MILLIGRAM(S): 0.4 CAPSULE ORAL at 21:00

## 2023-03-13 RX ADMIN — Medication 1 APPLICATION(S): at 14:40

## 2023-03-13 NOTE — SOCIAL WORK PROGRESS NOTE - NSSWPROGRESSNOTE_GEN_ALL_CORE
Per discussion w/ inpatient interdisciplinary treatment team this AM, patient may be approaching discharge readiness as he has been more able to engage in physical & occupational therapy through the weekend.  sent updated clinical information to patient's daughters' sub-acute rehab facility choices and received the following admission decisions: [facility] [place] is able to accept as long as patient is agreeable to his daily co-pay; [facility] in [place] wanted to know what patient's discharge plan would be if he is not able to complete transfers independently prior to the end of his sub-acute rehab stay; and [facility] in [place] is unable to accept.  consulted w/ patient's daughter about patient's sub-acute rehab co-pay & about [facility] question and then provided response (daughter said they can increase patient's level of care @ the assisted living facility if needed). During discussion w/ daughter,  made her aware that patient is approaching discharge readiness, and daughter stated that she would talk to patient to prepare him for upcoming discharge. Will continue to follow.

## 2023-03-13 NOTE — BH CONSULTATION LIAISON PROGRESS NOTE - CURRENT MEDICATION
MEDICATIONS  (STANDING):  acetaminophen     Tablet .. 1000 milliGRAM(s) Oral every 8 hours  aspirin enteric coated 81 milliGRAM(s) Oral daily  atorvastatin 20 milliGRAM(s) Oral at bedtime  budesonide  80 MICROgram(s)/formoterol 4.5 MICROgram(s) Inhaler 2 Puff(s) Inhalation two times a day  celecoxib 200 milliGRAM(s) Oral two times a day  enoxaparin Injectable 40 milliGRAM(s) SubCutaneous every 24 hours  hydrocortisone 1% Cream 1 Application(s) Topical three times a day  lactated ringers. 1000 milliLiter(s) (100 mL/Hr) IV Continuous <Continuous>  metoprolol tartrate 25 milliGRAM(s) Oral every 12 hours  mirtazapine 7.5 milliGRAM(s) Oral daily  modafinil 100 milliGRAM(s) Oral daily  pantoprazole    Tablet 40 milliGRAM(s) Oral before breakfast  polyethylene glycol 3350 17 Gram(s) Oral at bedtime  senna 2 Tablet(s) Oral at bedtime  tamsulosin 0.4 milliGRAM(s) Oral at bedtime    MEDICATIONS  (PRN):  aluminum hydroxide/magnesium hydroxide/simethicone Suspension 30 milliLiter(s) Oral every 6 hours PRN Dyspepsia  clobetasol 0.05% Cream 1 Application(s) Topical two times a day PRN Rash  HYDROmorphone   Tablet 4 milliGRAM(s) Oral every 4 hours PRN Moderate Pain (4 - 6)  HYDROmorphone   Tablet 6 milliGRAM(s) Oral every 4 hours PRN Severe Pain (7 - 10)  magnesium hydroxide Suspension 30 milliLiter(s) Oral daily PRN Constipation  ondansetron Injectable 4 milliGRAM(s) IV Push every 6 hours PRN Nausea and/or Vomiting

## 2023-03-13 NOTE — BH CONSULTATION LIAISON PROGRESS NOTE - NSBHCHARTREVIEWVS_PSY_A_CORE FT
Vital Signs Last 24 Hrs  T(C): 36.4 (13 Mar 2023 05:11), Max: 36.9 (12 Mar 2023 17:30)  T(F): 97.5 (13 Mar 2023 05:11), Max: 98.4 (12 Mar 2023 17:30)  HR: 71 (13 Mar 2023 05:11) (71 - 80)  BP: 149/84 (13 Mar 2023 05:11) (132/78 - 169/96)  BP(mean): --  RR: 16 (13 Mar 2023 05:41) (16 - 18)  SpO2: 94% (13 Mar 2023 05:41) (90% - 95%)    Parameters below as of 13 Mar 2023 05:41  Patient On (Oxygen Delivery Method): room air

## 2023-03-13 NOTE — BH CONSULTATION LIAISON PROGRESS NOTE - NSBHMSEMUSCLE_PSY_A_CORE
Left message to call back.  Writer called OP pharmacy to discuss. Per pharmacy insurance only allows 3 fills before it needs to be sent through mail order. OP pharmacy offered [facility] a coupon to help with cost but she declined.    Per insurance, [facility] prescription services needs to be used. [phone].    Writer will wait to hear from [facility] before taking action.   Normal muscle tone/strength

## 2023-03-13 NOTE — PROGRESS NOTE ADULT - SUBJECTIVE AND OBJECTIVE BOX
Subjective: Patient seen and examined. No overnight events.     MEDICATIONS  (STANDING):  acetaminophen     Tablet .. 1000 milliGRAM(s) Oral every 8 hours  aspirin enteric coated 81 milliGRAM(s) Oral daily  atorvastatin 20 milliGRAM(s) Oral at bedtime  budesonide  80 MICROgram(s)/formoterol 4.5 MICROgram(s) Inhaler 2 Puff(s) Inhalation two times a day  celecoxib 200 milliGRAM(s) Oral two times a day  enoxaparin Injectable 40 milliGRAM(s) SubCutaneous every 24 hours  hydrocortisone 1% Cream 1 Application(s) Topical three times a day  lactated ringers. 1000 milliLiter(s) (100 mL/Hr) IV Continuous <Continuous>  metoprolol tartrate 25 milliGRAM(s) Oral every 12 hours  mirtazapine 7.5 milliGRAM(s) Oral daily  modafinil 100 milliGRAM(s) Oral daily  pantoprazole    Tablet 40 milliGRAM(s) Oral before breakfast  polyethylene glycol 3350 17 Gram(s) Oral at bedtime  senna 2 Tablet(s) Oral at bedtime  tamsulosin 0.4 milliGRAM(s) Oral at bedtime    MEDICATIONS  (PRN):  aluminum hydroxide/magnesium hydroxide/simethicone Suspension 30 milliLiter(s) Oral every 6 hours PRN Dyspepsia  clobetasol 0.05% Cream 1 Application(s) Topical two times a day PRN Rash  HYDROmorphone   Tablet 4 milliGRAM(s) Oral every 4 hours PRN Moderate Pain (4 - 6)  HYDROmorphone   Tablet 6 milliGRAM(s) Oral every 4 hours PRN Severe Pain (7 - 10)  magnesium hydroxide Suspension 30 milliLiter(s) Oral daily PRN Constipation  ondansetron Injectable 4 milliGRAM(s) IV Push every 6 hours PRN Nausea and/or Vomiting      Allergies    Allergy Status Unknown    Intolerances    lactose (Diarrhea)      Vital Signs Last 24 Hrs  T(C): 36.8 (13 Mar 2023 13:52), Max: 36.9 (12 Mar 2023 17:30)  T(F): 98.2 (13 Mar 2023 13:52), Max: 98.4 (12 Mar 2023 17:30)  HR: 70 (13 Mar 2023 13:52) (70 - 80)  BP: 131/72 (13 Mar 2023 13:52) (131/72 - 169/96)  BP(mean): --  RR: 18 (13 Mar 2023 13:52) (16 - 18)  SpO2: 93% (13 Mar 2023 13:52) (90% - 95%)    Parameters below as of 13 Mar 2023 13:52  Patient On (Oxygen Delivery Method): room air        PHYSICAL EXAM:  GENERAL: NAD, well-groomed, well-developed  HEAD:  Atraumatic, Normocephalic  ENMT: Moist mucous membranes,   NECK: Supple, No JVD, Normal thyroid  NERVOUS SYSTEM:  All 4 extremities mobile, no gross sensory deficits.   CHEST/LUNG: Clear to auscultation bilaterally; No rales, rhonchi, wheezing, or rubs  HEART: Regular rate and rhythm; No murmurs, rubs, or gallops  ABDOMEN: Soft, Nontender, Nondistended; Bowel sounds present  EXTREMITIES:  2+ Peripheral Pulses, No clubbing, cyanosis, or edema      LABS:              CAPILLARY BLOOD GLUCOSE          RADIOLOGY & ADDITIONAL TESTS:    Imaging Personally Reviewed:  [ ] YES     Consultant(s) Notes Reviewed:      Care Discussed with Consultants/Other Providers:    Advanced Directives: [ ] DNR  [ ] No feeding tube  [ ] MOLST in chart  [ ] MOLST completed today  [ ] Unknown

## 2023-03-13 NOTE — BH CONSULTATION LIAISON PROGRESS NOTE - NSBHFUPINTERVALHXFT_PSY_A_CORE
Patient seen, evaluated and chart reviewed. Patient presents with improved mood and feels in better control now. He reports no suicidality at this time. No evidence of psychosis, jacqueline or depression.

## 2023-03-13 NOTE — PROGRESS NOTE ADULT - SUBJECTIVE AND OBJECTIVE BOX
Chief Complaint: Fall    Interval Events: No events overnight.    Review of Systems:  General: No fevers, chills, weight gain  Skin: No rashes, color changes  Cardiovascular: No chest pain, orthopnea  Respiratory: No shortness of breath, cough  Gastrointestinal: No nausea, abdominal pain  Genitourinary: No incontinence, pain with urination  Musculoskeletal: No pain, swelling, decreased range of motion  Neurological: No headache, weakness  Psychiatric: No depression, anxiety  Endocrine: No weight gain, increased thirst  All other systems are comprehensively negative.    Physical Exam:  Vital Signs Last 24 Hrs  T(C): 36.4 (13 Mar 2023 05:11), Max: 36.9 (12 Mar 2023 12:36)  T(F): 97.5 (13 Mar 2023 05:11), Max: 98.4 (12 Mar 2023 12:36)  HR: 71 (13 Mar 2023 05:11) (68 - 80)  BP: 149/84 (13 Mar 2023 05:11) (132/78 - 169/96)  BP(mean): --  RR: 16 (13 Mar 2023 05:41) (16 - 18)  SpO2: 94% (13 Mar 2023 05:41) (90% - 95%)  Parameters below as of 13 Mar 2023 05:41  Patient On (Oxygen Delivery Method): room air  General: NAD  HEENT: MMM  Neck: No JVD, no carotid bruit  Lungs: CTAB  CV: RRR, nl S1/S2, no M/R/G  Abdomen: S/NT/ND, +BS  Extremities: No LE edema, no cyanosis  Neuro: AAOx3, non-focal  Skin: No rash    Labs:        ECG/Telemetry: Sinus rhythm

## 2023-03-13 NOTE — PROGRESS NOTE ADULT - SUBJECTIVE AND OBJECTIVE BOX
Post Op     SAMANTA BROWN      82y        Male                                                                                                                 T(C): 36.4 (03-13-23 @ 05:11), Max: 36.9 (03-12-23 @ 12:36)  HR: 71 (03-13-23 @ 05:11) (68 - 80)  BP: 149/84 (03-13-23 @ 05:11) (132/78 - 169/96)  RR: 16 (03-13-23 @ 05:41) (16 - 18)  SpO2: 94% (03-13-23 @ 05:41) (90% - 95%)  Wt(kg): --    S/P  open reduction internal fixation right hip    Patient denies shortness of breath, chest pain, dyspnea, No complaints  Pain is 3 /10    Physical Exam    Extremity: Bilaterally:  No holmon                                           No Cord                                          PAS on                                          Neurovascular intact                                          Motor intact EHL/FHL                                          Sensation intact                                          Pulses intact DP/PT                                         Calves Soft                                    incision  Clean / Dry / Intact was changed silverlone applied  (2ndery to patient incontinence)                                         Capillary refill with 5 seconds  ecchymosis noted                                           A/P  -- S/P open reduction internal fixation right hip      -  Medicine To Follow   - DVT prophylaxis PAS lovenox 40mg  daily   - PT & OT slow to progress notes  - Analagesia  - Incentive Spirometry  - Discharge Planning [facility]isted living vs [facility]  - Safety Precautions  -  CBC , BMP daily

## 2023-03-14 LAB — SARS-COV-2 RNA SPEC QL NAA+PROBE: SIGNIFICANT CHANGE UP

## 2023-03-14 PROCEDURE — 99231 SBSQ HOSP IP/OBS SF/LOW 25: CPT

## 2023-03-14 RX ADMIN — HYDROMORPHONE HYDROCHLORIDE 4 MILLIGRAM(S): 2 INJECTION INTRAMUSCULAR; INTRAVENOUS; SUBCUTANEOUS at 11:50

## 2023-03-14 RX ADMIN — SENNA PLUS 2 TABLET(S): 8.6 TABLET ORAL at 21:31

## 2023-03-14 RX ADMIN — TAMSULOSIN HYDROCHLORIDE 0.4 MILLIGRAM(S): 0.4 CAPSULE ORAL at 21:31

## 2023-03-14 RX ADMIN — LIDOCAINE 1 PATCH: 4 CREAM TOPICAL at 18:10

## 2023-03-14 RX ADMIN — MODAFINIL 100 MILLIGRAM(S): 200 TABLET ORAL at 05:35

## 2023-03-14 RX ADMIN — LIDOCAINE 1 PATCH: 4 CREAM TOPICAL at 05:51

## 2023-03-14 RX ADMIN — CELECOXIB 200 MILLIGRAM(S): 200 CAPSULE ORAL at 21:31

## 2023-03-14 RX ADMIN — Medication 25 MILLIGRAM(S): at 05:33

## 2023-03-14 RX ADMIN — MIRTAZAPINE 7.5 MILLIGRAM(S): 45 TABLET, ORALLY DISINTEGRATING ORAL at 11:10

## 2023-03-14 RX ADMIN — Medication 1 APPLICATION(S): at 18:09

## 2023-03-14 RX ADMIN — BUDESONIDE AND FORMOTEROL FUMARATE DIHYDRATE 2 PUFF(S): 160; 4.5 AEROSOL RESPIRATORY (INHALATION) at 05:33

## 2023-03-14 RX ADMIN — ATORVASTATIN CALCIUM 20 MILLIGRAM(S): 80 TABLET, FILM COATED ORAL at 21:32

## 2023-03-14 RX ADMIN — HYDROMORPHONE HYDROCHLORIDE 4 MILLIGRAM(S): 2 INJECTION INTRAMUSCULAR; INTRAVENOUS; SUBCUTANEOUS at 11:10

## 2023-03-14 RX ADMIN — CELECOXIB 200 MILLIGRAM(S): 200 CAPSULE ORAL at 10:50

## 2023-03-14 RX ADMIN — CELECOXIB 200 MILLIGRAM(S): 200 CAPSULE ORAL at 10:13

## 2023-03-14 RX ADMIN — Medication 81 MILLIGRAM(S): at 11:10

## 2023-03-14 RX ADMIN — PANTOPRAZOLE SODIUM 40 MILLIGRAM(S): 20 TABLET, DELAYED RELEASE ORAL at 05:33

## 2023-03-14 RX ADMIN — Medication 1000 MILLIGRAM(S): at 18:50

## 2023-03-14 RX ADMIN — Medication 1000 MILLIGRAM(S): at 18:10

## 2023-03-14 RX ADMIN — LIDOCAINE 1 PATCH: 4 CREAM TOPICAL at 19:00

## 2023-03-14 RX ADMIN — Medication 1 APPLICATION(S): at 05:34

## 2023-03-14 RX ADMIN — ENOXAPARIN SODIUM 40 MILLIGRAM(S): 100 INJECTION SUBCUTANEOUS at 10:13

## 2023-03-14 RX ADMIN — Medication 1 APPLICATION(S): at 21:31

## 2023-03-14 RX ADMIN — Medication 25 MILLIGRAM(S): at 18:10

## 2023-03-14 RX ADMIN — BUDESONIDE AND FORMOTEROL FUMARATE DIHYDRATE 2 PUFF(S): 160; 4.5 AEROSOL RESPIRATORY (INHALATION) at 21:31

## 2023-03-14 RX ADMIN — CELECOXIB 200 MILLIGRAM(S): 200 CAPSULE ORAL at 22:00

## 2023-03-14 NOTE — PROGRESS NOTE ADULT - SUBJECTIVE AND OBJECTIVE BOX
Chief Complaint: Fall    Interval Events: No events overnight.    Review of Systems:  General: No fevers, chills, weight gain  Skin: No rashes, color changes  Cardiovascular: No chest pain, orthopnea  Respiratory: No shortness of breath, cough  Gastrointestinal: No nausea, abdominal pain  Genitourinary: No incontinence, pain with urination  Musculoskeletal: No pain, swelling, decreased range of motion  Neurological: No headache, weakness  Psychiatric: No depression, anxiety  Endocrine: No weight gain, increased thirst  All other systems are comprehensively negative.    Physical Exam:  Vital Signs Last 24 Hrs  T(C): 36.6 (14 Mar 2023 04:42), Max: 36.8 (13 Mar 2023 13:52)  T(F): 97.9 (14 Mar 2023 04:42), Max: 98.2 (13 Mar 2023 13:52)  HR: 75 (14 Mar 2023 04:42) (70 - 75)  BP: 155/85 (14 Mar 2023 04:42) (131/72 - 163/80)  BP(mean): --  RR: 17 (14 Mar 2023 04:42) (16 - 18)  SpO2: 97% (14 Mar 2023 04:42) (93% - 97%)  Parameters below as of 14 Mar 2023 04:42  Patient On (Oxygen Delivery Method): room air  General: NAD  HEENT: MMM  Neck: No JVD, no carotid bruit  Lungs: CTAB  CV: RRR, nl S1/S2, no M/R/G  Abdomen: S/NT/ND, +BS  Extremities: No LE edema, no cyanosis  Neuro: AAOx3, non-focal  Skin: No rash    Labs:          ECG/Telemetry: Sinus rhythm

## 2023-03-14 NOTE — SOCIAL WORK PROGRESS NOTE - NSSWPROGRESSNOTE_GEN_ALL_CORE
Per discussion w/ inpatient interdisciplinary treatment team this AM, patient continues to demonstrate improved & consistent participation w/ P.T./O.T. departments. Per review of sub-acute rehab referral, patient has been accepted to [facility] in [place] and [facility] in [place] pending bed availability on date of discharge, and patient was not accepted to the [facility] in [place].  therefore met w/ patient's daughter @ bedside on unit 1East for discussion of the above; daughter reported that she would talk to patient about the above two choices and agreed to have  call both facilities tomorrow AM (03/15/2023) to see which has an available bed for discharge. Will continue to follow.

## 2023-03-14 NOTE — PROGRESS NOTE ADULT - SUBJECTIVE AND OBJECTIVE BOX
Subjective: Patient seen and examined. No overnight events.     MEDICATIONS  (STANDING):  acetaminophen     Tablet .. 1000 milliGRAM(s) Oral every 8 hours  aspirin enteric coated 81 milliGRAM(s) Oral daily  atorvastatin 20 milliGRAM(s) Oral at bedtime  budesonide  80 MICROgram(s)/formoterol 4.5 MICROgram(s) Inhaler 2 Puff(s) Inhalation two times a day  celecoxib 200 milliGRAM(s) Oral two times a day  enoxaparin Injectable 40 milliGRAM(s) SubCutaneous every 24 hours  hydrocortisone 1% Cream 1 Application(s) Topical three times a day  lactated ringers. 1000 milliLiter(s) (100 mL/Hr) IV Continuous <Continuous>  lidocaine   4% Patch 1 Patch Transdermal every 24 hours  metoprolol tartrate 25 milliGRAM(s) Oral every 12 hours  mirtazapine 7.5 milliGRAM(s) Oral daily  modafinil 100 milliGRAM(s) Oral daily  pantoprazole    Tablet 40 milliGRAM(s) Oral before breakfast  polyethylene glycol 3350 17 Gram(s) Oral at bedtime  senna 2 Tablet(s) Oral at bedtime  tamsulosin 0.4 milliGRAM(s) Oral at bedtime    MEDICATIONS  (PRN):  aluminum hydroxide/magnesium hydroxide/simethicone Suspension 30 milliLiter(s) Oral every 6 hours PRN Dyspepsia  clobetasol 0.05% Cream 1 Application(s) Topical two times a day PRN Rash  HYDROmorphone   Tablet 4 milliGRAM(s) Oral every 4 hours PRN Moderate Pain (4 - 6)  HYDROmorphone   Tablet 6 milliGRAM(s) Oral every 4 hours PRN Severe Pain (7 - 10)  magnesium hydroxide Suspension 30 milliLiter(s) Oral daily PRN Constipation  ondansetron Injectable 4 milliGRAM(s) IV Push every 6 hours PRN Nausea and/or Vomiting      Allergies    Allergy Status Unknown    Intolerances    lactose (Diarrhea)      Vital Signs Last 24 Hrs  T(C): 36.6 (14 Mar 2023 04:42), Max: 36.8 (13 Mar 2023 13:52)  T(F): 97.9 (14 Mar 2023 04:42), Max: 98.2 (13 Mar 2023 13:52)  HR: 75 (14 Mar 2023 04:42) (70 - 75)  BP: 155/85 (14 Mar 2023 04:42) (131/72 - 163/80)  BP(mean): --  RR: 17 (14 Mar 2023 04:42) (16 - 18)  SpO2: 97% (14 Mar 2023 04:42) (93% - 97%)    Parameters below as of 14 Mar 2023 04:42  Patient On (Oxygen Delivery Method): room air        PHYSICAL EXAM:  GENERAL: NAD, well-groomed, well-developed  HEAD:  Atraumatic, Normocephalic  ENMT: Moist mucous membranes,   NECK: Supple, No JVD, Normal thyroid  NERVOUS SYSTEM:  All 4 extremities mobile, no gross sensory deficits.   CHEST/LUNG: Clear to auscultation bilaterally; No rales, rhonchi, wheezing, or rubs  HEART: Regular rate and rhythm; No murmurs, rubs, or gallops  ABDOMEN: Soft, Nontender, Nondistended; Bowel sounds present  EXTREMITIES:  2+ Peripheral Pulses, No clubbing, cyanosis, or edema      LABS:              CAPILLARY BLOOD GLUCOSE          RADIOLOGY & ADDITIONAL TESTS:    Imaging Personally Reviewed:  [ ] YES     Consultant(s) Notes Reviewed:      Care Discussed with Consultants/Other Providers:    Advanced Directives: [ ] DNR  [ ] No feeding tube  [ ] MOLST in chart  [ ] MOLST completed today  [ ] Unknown

## 2023-03-14 NOTE — PROGRESS NOTE ADULT - SUBJECTIVE AND OBJECTIVE BOX
Orthopedics     POD #  s/p ORIF Right Hip   Pain is controlled. Pt feeling well. No nausea or vomiting. Has been OOB with PT. Ambulated in hallway.    Vital Signs Last 24 Hrs  T(C): 36.7 (03-14-23 @ 14:31), Max: 36.7 (03-13-23 @ 22:00)  T(F): 98.1 (03-14-23 @ 14:31), Max: 98.1 (03-14-23 @ 14:31)  HR: 77 (03-14-23 @ 14:31) (65 - 77)  BP: 138/87 (03-14-23 @ 14:31) (138/87 - 165/85)  RR: 17 (03-14-23 @ 14:31) (16 - 17)  SpO2: 92% (03-14-23 @ 14:31) (92% - 97%)              Exam:  NAD AAOx3  Right Hip: Silverlon Dressings clean, dry and intact  Bilateral LE's:  5/5 plantarflexion/dorsiflexion/EHL  2+DP pulse  Calf Soft, NT        A/P:  Stable POD #10 s/p ORIF Right Hip   -Pain control  -DVT PE ppx Lovenox 40mg SQ daily with Aspirin 81mg daily  -OOB PT/OT  -WBAT  - [facility] tomorrow pending bed availabilty   Orthopedics     POD #  s/p ORIF Right Hip   Pain is controlled. Pt feeling well. No nausea or vomiting. Has been OOB with PT. Ambulated in hallway.    Vital Signs Last 24 Hrs  T(C): 36.7 (03-14-23 @ 14:31), Max: 36.7 (03-13-23 @ 22:00)  T(F): 98.1 (03-14-23 @ 14:31), Max: 98.1 (03-14-23 @ 14:31)  HR: 77 (03-14-23 @ 14:31) (65 - 77)  BP: 138/87 (03-14-23 @ 14:31) (138/87 - 165/85)  RR: 17 (03-14-23 @ 14:31) (16 - 17)  SpO2: 92% (03-14-23 @ 14:31) (92% - 97%)              Exam:  NAD   Right Hip: Silverlon Dressings clean, dry and intact  Bilateral LE's:  5/5 plantarflexion/dorsiflexion/EHL  2+DP pulse  Calf Soft, NT        A/P:  Stable POD #10 s/p ORIF Right Hip   -Pain control  -DVT PE ppx Lovenox 40mg SQ daily with Aspirin 81mg daily  -OOB PT/OT  -WBAT  - [facility] tomorrow pending bed availabilty

## 2023-03-15 LAB
ANION GAP SERPL CALC-SCNC: 8 MMOL/L — SIGNIFICANT CHANGE UP (ref 5–17)
BUN SERPL-MCNC: 23 MG/DL — SIGNIFICANT CHANGE UP (ref 7–23)
CALCIUM SERPL-MCNC: 9.3 MG/DL — SIGNIFICANT CHANGE UP (ref 8.4–10.5)
CHLORIDE SERPL-SCNC: 106 MMOL/L — SIGNIFICANT CHANGE UP (ref 96–108)
CO2 SERPL-SCNC: 26 MMOL/L — SIGNIFICANT CHANGE UP (ref 22–31)
CREAT SERPL-MCNC: 0.66 MG/DL — SIGNIFICANT CHANGE UP (ref 0.5–1.3)
EGFR: 93 ML/MIN/1.73M2 — SIGNIFICANT CHANGE UP
GLUCOSE SERPL-MCNC: 95 MG/DL — SIGNIFICANT CHANGE UP (ref 70–99)
HCT VFR BLD CALC: 32.9 % — LOW (ref 39–50)
HGB BLD-MCNC: 10.9 G/DL — LOW (ref 13–17)
MCHC RBC-ENTMCNC: 29.1 PG — SIGNIFICANT CHANGE UP (ref 27–34)
MCHC RBC-ENTMCNC: 33.1 GM/DL — SIGNIFICANT CHANGE UP (ref 32–36)
MCV RBC AUTO: 88 FL — SIGNIFICANT CHANGE UP (ref 80–100)
NRBC # BLD: 0 /100 WBCS — SIGNIFICANT CHANGE UP (ref 0–0)
PLATELET # BLD AUTO: 361 K/UL — SIGNIFICANT CHANGE UP (ref 150–400)
POTASSIUM SERPL-MCNC: 4 MMOL/L — SIGNIFICANT CHANGE UP (ref 3.5–5.3)
POTASSIUM SERPL-SCNC: 4 MMOL/L — SIGNIFICANT CHANGE UP (ref 3.5–5.3)
RBC # BLD: 3.74 M/UL — LOW (ref 4.2–5.8)
RBC # FLD: 14.6 % — HIGH (ref 10.3–14.5)
SODIUM SERPL-SCNC: 140 MMOL/L — SIGNIFICANT CHANGE UP (ref 135–145)
WBC # BLD: 10.12 K/UL — SIGNIFICANT CHANGE UP (ref 3.8–10.5)
WBC # FLD AUTO: 10.12 K/UL — SIGNIFICANT CHANGE UP (ref 3.8–10.5)

## 2023-03-15 PROCEDURE — 99231 SBSQ HOSP IP/OBS SF/LOW 25: CPT

## 2023-03-15 RX ORDER — FLECAINIDE ACETATE 50 MG
1 TABLET ORAL
Qty: 0 | Refills: 0 | DISCHARGE

## 2023-03-15 RX ORDER — HYDROMORPHONE HYDROCHLORIDE 2 MG/ML
3 INJECTION INTRAMUSCULAR; INTRAVENOUS; SUBCUTANEOUS
Qty: 0 | Refills: 0 | DISCHARGE
Start: 2023-03-15

## 2023-03-15 RX ORDER — HYDROMORPHONE HYDROCHLORIDE 2 MG/ML
1 INJECTION INTRAMUSCULAR; INTRAVENOUS; SUBCUTANEOUS
Qty: 0 | Refills: 0 | DISCHARGE
Start: 2023-03-15

## 2023-03-15 RX ADMIN — CELECOXIB 200 MILLIGRAM(S): 200 CAPSULE ORAL at 08:42

## 2023-03-15 RX ADMIN — HYDROMORPHONE HYDROCHLORIDE 4 MILLIGRAM(S): 2 INJECTION INTRAMUSCULAR; INTRAVENOUS; SUBCUTANEOUS at 10:50

## 2023-03-15 RX ADMIN — Medication 25 MILLIGRAM(S): at 17:48

## 2023-03-15 RX ADMIN — MODAFINIL 100 MILLIGRAM(S): 200 TABLET ORAL at 05:23

## 2023-03-15 RX ADMIN — ATORVASTATIN CALCIUM 20 MILLIGRAM(S): 80 TABLET, FILM COATED ORAL at 21:13

## 2023-03-15 RX ADMIN — Medication 1 APPLICATION(S): at 05:24

## 2023-03-15 RX ADMIN — Medication 1 APPLICATION(S): at 21:11

## 2023-03-15 RX ADMIN — PANTOPRAZOLE SODIUM 40 MILLIGRAM(S): 20 TABLET, DELAYED RELEASE ORAL at 05:23

## 2023-03-15 RX ADMIN — CELECOXIB 200 MILLIGRAM(S): 200 CAPSULE ORAL at 21:12

## 2023-03-15 RX ADMIN — Medication 1 APPLICATION(S): at 12:54

## 2023-03-15 RX ADMIN — HYDROMORPHONE HYDROCHLORIDE 4 MILLIGRAM(S): 2 INJECTION INTRAMUSCULAR; INTRAVENOUS; SUBCUTANEOUS at 09:59

## 2023-03-15 RX ADMIN — LIDOCAINE 1 PATCH: 4 CREAM TOPICAL at 06:24

## 2023-03-15 RX ADMIN — MIRTAZAPINE 7.5 MILLIGRAM(S): 45 TABLET, ORALLY DISINTEGRATING ORAL at 12:51

## 2023-03-15 RX ADMIN — Medication 81 MILLIGRAM(S): at 12:51

## 2023-03-15 RX ADMIN — SENNA PLUS 2 TABLET(S): 8.6 TABLET ORAL at 21:12

## 2023-03-15 RX ADMIN — LIDOCAINE 1 PATCH: 4 CREAM TOPICAL at 19:45

## 2023-03-15 RX ADMIN — Medication 1000 MILLIGRAM(S): at 23:45

## 2023-03-15 RX ADMIN — TAMSULOSIN HYDROCHLORIDE 0.4 MILLIGRAM(S): 0.4 CAPSULE ORAL at 21:12

## 2023-03-15 RX ADMIN — Medication 25 MILLIGRAM(S): at 05:23

## 2023-03-15 RX ADMIN — LIDOCAINE 1 PATCH: 4 CREAM TOPICAL at 16:13

## 2023-03-15 RX ADMIN — ENOXAPARIN SODIUM 40 MILLIGRAM(S): 100 INJECTION SUBCUTANEOUS at 08:42

## 2023-03-15 RX ADMIN — CELECOXIB 200 MILLIGRAM(S): 200 CAPSULE ORAL at 21:45

## 2023-03-15 NOTE — SOCIAL WORK PROGRESS NOTE - NSSWPROGRESSNOTE_GEN_ALL_CORE
Per discussion w/ inpatient interdisciplinary treatment team this AM, patient remains medically cleared for transition to next level of care today. Per responses received from [facility] in [place] and [facility] in [place], patient has been medically accepted to both, and both have an available sub-acute rehab bed for patient today. When  met w/ patient this AM, he accepted bed offer from [facility] and seemed agreeable to discharge in the PM, however when  met w/ him again in the PM, he reported that he is too tired and does not want to leave the hospital.  provided 24hr notice for discharge and reviewed Important Message from Medicare (IMM) regarding patient's right to appeal his hospital discharge should he feel he will not agree to discharge w/in the next 24hr.  explained that the accepting facilities cannot hold the available bed for patient overnight, so we will have to hope that one will be available tomorrow, or else he would need to appeal his discharge, and he reported to be understanding o such.  also spoke w/ patient's daughter Kenya over the phone to notify her of the above. Will continue to follow.

## 2023-03-15 NOTE — PROGRESS NOTE ADULT - SUBJECTIVE AND OBJECTIVE BOX
Post Op Note- S/P ORIF Right Hip on 3/4/22    SUBJECTIVE    83yo Male status post ORIF right hip, pain has been well controlled.  Silverlon dressing was changed 2 days ago.  Patient is alert and comfortable. Has been OOB with PT.  Denies chest pain/shortness of breath/nausea/vomiting.     OBJECTIVE    T(C): 36.4 (03-15-23 @ 04:36), Max: 36.7 (03-14-23 @ 14:31)  HR: 64 (03-15-23 @ 04:36) (64 - 77)  BP: 167/92 (03-15-23 @ 04:36) (138/87 - 167/92)  RR: 18 (03-15-23 @ 04:36) (17 - 18)  SpO2: 95% (03-15-23 @ 04:36) (92% - 96%)  Wt(kg): --      03-14 @ 07:01  -  03-15 @ 07:00  --------------------------------------------------------  IN: 0 mL / OUT: 650 mL / NET: -650 mL        PHYSICAL EXAM    Right hip primary dressing C/D/I  Bilateral LE's:  Sensation Intact to Light Touch distally  EHL/FHL intact  2+DP pulse  Calves soft/nontender bilaterally     ASSESSMENT AND PLAN  Stable POD #11 s/p ORIF Right Hip    - Orthopedically stable  - DVT prophylaxis: PAS + Lovenox 40 mg SQ daily with Aspirin 81mg daily  - OOB as tolerated with PT/OT, WBAT  - Pain control as clinically indicated  - Medicine to follow   - [facility] pending bed availability

## 2023-03-15 NOTE — PROGRESS NOTE ADULT - SUBJECTIVE AND OBJECTIVE BOX
Subjective: Patient seen and examined. No overnight events.     MEDICATIONS  (STANDING):  acetaminophen     Tablet .. 1000 milliGRAM(s) Oral every 8 hours  aspirin enteric coated 81 milliGRAM(s) Oral daily  atorvastatin 20 milliGRAM(s) Oral at bedtime  budesonide  80 MICROgram(s)/formoterol 4.5 MICROgram(s) Inhaler 2 Puff(s) Inhalation two times a day  celecoxib 200 milliGRAM(s) Oral two times a day  enoxaparin Injectable 40 milliGRAM(s) SubCutaneous every 24 hours  hydrocortisone 1% Cream 1 Application(s) Topical three times a day  lactated ringers. 1000 milliLiter(s) (100 mL/Hr) IV Continuous <Continuous>  lidocaine   4% Patch 1 Patch Transdermal every 24 hours  metoprolol tartrate 25 milliGRAM(s) Oral every 12 hours  mirtazapine 7.5 milliGRAM(s) Oral daily  modafinil 100 milliGRAM(s) Oral daily  pantoprazole    Tablet 40 milliGRAM(s) Oral before breakfast  polyethylene glycol 3350 17 Gram(s) Oral at bedtime  senna 2 Tablet(s) Oral at bedtime  tamsulosin 0.4 milliGRAM(s) Oral at bedtime    MEDICATIONS  (PRN):  aluminum hydroxide/magnesium hydroxide/simethicone Suspension 30 milliLiter(s) Oral every 6 hours PRN Dyspepsia  clobetasol 0.05% Cream 1 Application(s) Topical two times a day PRN Rash  HYDROmorphone   Tablet 4 milliGRAM(s) Oral every 4 hours PRN Moderate Pain (4 - 6)  HYDROmorphone   Tablet 6 milliGRAM(s) Oral every 4 hours PRN Severe Pain (7 - 10)  magnesium hydroxide Suspension 30 milliLiter(s) Oral daily PRN Constipation  ondansetron Injectable 4 milliGRAM(s) IV Push every 6 hours PRN Nausea and/or Vomiting      Allergies    Allergy Status Unknown    Intolerances    lactose (Diarrhea)      Vital Signs Last 24 Hrs  T(C): 36.4 (15 Mar 2023 04:36), Max: 36.7 (14 Mar 2023 14:31)  T(F): 97.5 (15 Mar 2023 04:36), Max: 98.1 (14 Mar 2023 14:31)  HR: 64 (15 Mar 2023 04:36) (64 - 77)  BP: 167/92 (15 Mar 2023 04:36) (138/87 - 167/92)  BP(mean): --  RR: 18 (15 Mar 2023 04:36) (17 - 18)  SpO2: 95% (15 Mar 2023 04:36) (92% - 96%)    Parameters below as of 14 Mar 2023 21:49  Patient On (Oxygen Delivery Method): room air        PHYSICAL EXAM:  GENERAL: NAD, well-groomed, well-developed  HEAD:  Atraumatic, Normocephalic  ENMT: Moist mucous membranes,   NECK: Supple, No JVD, Normal thyroid  NERVOUS SYSTEM:  All 4 extremities mobile, no gross sensory deficits.   CHEST/LUNG: Clear to auscultation bilaterally; No rales, rhonchi, wheezing, or rubs  HEART: Regular rate and rhythm; No murmurs, rubs, or gallops  ABDOMEN: Soft, Nontender, Nondistended; Bowel sounds present  EXTREMITIES:  2+ Peripheral Pulses, No clubbing, cyanosis, or edema      LABS:              CAPILLARY BLOOD GLUCOSE          RADIOLOGY & ADDITIONAL TESTS:    Imaging Personally Reviewed:  [ ] YES     Consultant(s) Notes Reviewed:      Care Discussed with Consultants/Other Providers:    Advanced Directives: [ ] DNR  [ ] No feeding tube  [ ] MOLST in chart  [ ] MOLST completed today  [ ] Unknown

## 2023-03-15 NOTE — PROGRESS NOTE ADULT - SUBJECTIVE AND OBJECTIVE BOX
Chief Complaint: Fall    Interval Events: No events overnight.    Review of Systems:  General: No fevers, chills, weight gain  Skin: No rashes, color changes  Cardiovascular: No chest pain, orthopnea  Respiratory: No shortness of breath, cough  Gastrointestinal: No nausea, abdominal pain  Genitourinary: No incontinence, pain with urination  Musculoskeletal: No pain, swelling, decreased range of motion  Neurological: No headache, weakness  Psychiatric: No depression, anxiety  Endocrine: No weight gain, increased thirst  All other systems are comprehensively negative.    Physical Exam:  Vital Signs Last 24 Hrs  T(C): 36.4 (15 Mar 2023 04:36), Max: 36.7 (14 Mar 2023 14:31)  T(F): 97.5 (15 Mar 2023 04:36), Max: 98.1 (14 Mar 2023 14:31)  HR: 64 (15 Mar 2023 04:36) (64 - 77)  BP: 167/92 (15 Mar 2023 04:36) (138/87 - 167/92)  BP(mean): --  RR: 18 (15 Mar 2023 04:36) (17 - 18)  SpO2: 95% (15 Mar 2023 04:36) (92% - 96%)  Parameters below as of 14 Mar 2023 21:49  Patient On (Oxygen Delivery Method): room air  General: NAD  HEENT: MMM  Neck: No JVD, no carotid bruit  Lungs: CTAB  CV: RRR, nl S1/S2, no M/R/G  Abdomen: S/NT/ND, +BS  Extremities: No LE edema, no cyanosis  Neuro: AAOx3, non-focal  Skin: No rash    Labs:

## 2023-03-16 VITALS
OXYGEN SATURATION: 94 % | SYSTOLIC BLOOD PRESSURE: 128 MMHG | RESPIRATION RATE: 17 BRPM | DIASTOLIC BLOOD PRESSURE: 62 MMHG | HEART RATE: 74 BPM | TEMPERATURE: 98 F

## 2023-03-16 PROCEDURE — 80048 BASIC METABOLIC PNL TOTAL CA: CPT

## 2023-03-16 PROCEDURE — 97116 GAIT TRAINING THERAPY: CPT

## 2023-03-16 PROCEDURE — 82962 GLUCOSE BLOOD TEST: CPT

## 2023-03-16 PROCEDURE — 85730 THROMBOPLASTIN TIME PARTIAL: CPT

## 2023-03-16 PROCEDURE — C1889: CPT

## 2023-03-16 PROCEDURE — 85025 COMPLETE CBC W/AUTO DIFF WBC: CPT

## 2023-03-16 PROCEDURE — 86900 BLOOD TYPING SEROLOGIC ABO: CPT

## 2023-03-16 PROCEDURE — 82728 ASSAY OF FERRITIN: CPT

## 2023-03-16 PROCEDURE — 83735 ASSAY OF MAGNESIUM: CPT

## 2023-03-16 PROCEDURE — 97165 OT EVAL LOW COMPLEX 30 MIN: CPT

## 2023-03-16 PROCEDURE — 83540 ASSAY OF IRON: CPT

## 2023-03-16 PROCEDURE — 97530 THERAPEUTIC ACTIVITIES: CPT

## 2023-03-16 PROCEDURE — 99239 HOSP IP/OBS DSCHRG MGMT >30: CPT

## 2023-03-16 PROCEDURE — 87640 STAPH A DNA AMP PROBE: CPT

## 2023-03-16 PROCEDURE — 97535 SELF CARE MNGMENT TRAINING: CPT

## 2023-03-16 PROCEDURE — 73502 X-RAY EXAM HIP UNI 2-3 VIEWS: CPT

## 2023-03-16 PROCEDURE — 86850 RBC ANTIBODY SCREEN: CPT

## 2023-03-16 PROCEDURE — 82746 ASSAY OF FOLIC ACID SERUM: CPT

## 2023-03-16 PROCEDURE — 71045 X-RAY EXAM CHEST 1 VIEW: CPT

## 2023-03-16 PROCEDURE — 85027 COMPLETE CBC AUTOMATED: CPT

## 2023-03-16 PROCEDURE — 87635 SARS-COV-2 COVID-19 AMP PRB: CPT

## 2023-03-16 PROCEDURE — 87641 MR-STAPH DNA AMP PROBE: CPT

## 2023-03-16 PROCEDURE — 84443 ASSAY THYROID STIM HORMONE: CPT

## 2023-03-16 PROCEDURE — 70450 CT HEAD/BRAIN W/O DYE: CPT | Mod: MA

## 2023-03-16 PROCEDURE — 97161 PT EVAL LOW COMPLEX 20 MIN: CPT

## 2023-03-16 PROCEDURE — 93005 ELECTROCARDIOGRAM TRACING: CPT

## 2023-03-16 PROCEDURE — 80053 COMPREHEN METABOLIC PANEL: CPT

## 2023-03-16 PROCEDURE — 85610 PROTHROMBIN TIME: CPT

## 2023-03-16 PROCEDURE — 83550 IRON BINDING TEST: CPT

## 2023-03-16 PROCEDURE — 82607 VITAMIN B-12: CPT

## 2023-03-16 PROCEDURE — 97110 THERAPEUTIC EXERCISES: CPT

## 2023-03-16 PROCEDURE — 99285 EMERGENCY DEPT VISIT HI MDM: CPT

## 2023-03-16 PROCEDURE — 72125 CT NECK SPINE W/O DYE: CPT | Mod: MA

## 2023-03-16 PROCEDURE — 94640 AIRWAY INHALATION TREATMENT: CPT

## 2023-03-16 PROCEDURE — C1713: CPT

## 2023-03-16 PROCEDURE — 36415 COLL VENOUS BLD VENIPUNCTURE: CPT

## 2023-03-16 PROCEDURE — 86901 BLOOD TYPING SEROLOGIC RH(D): CPT

## 2023-03-16 PROCEDURE — 76000 FLUOROSCOPY <1 HR PHYS/QHP: CPT

## 2023-03-16 RX ADMIN — CELECOXIB 200 MILLIGRAM(S): 200 CAPSULE ORAL at 08:31

## 2023-03-16 RX ADMIN — Medication 1 APPLICATION(S): at 06:01

## 2023-03-16 RX ADMIN — ENOXAPARIN SODIUM 40 MILLIGRAM(S): 100 INJECTION SUBCUTANEOUS at 08:31

## 2023-03-16 RX ADMIN — MODAFINIL 100 MILLIGRAM(S): 200 TABLET ORAL at 06:00

## 2023-03-16 RX ADMIN — Medication 81 MILLIGRAM(S): at 12:08

## 2023-03-16 RX ADMIN — PANTOPRAZOLE SODIUM 40 MILLIGRAM(S): 20 TABLET, DELAYED RELEASE ORAL at 06:00

## 2023-03-16 RX ADMIN — Medication 25 MILLIGRAM(S): at 06:00

## 2023-03-16 RX ADMIN — Medication 1000 MILLIGRAM(S): at 00:15

## 2023-03-16 RX ADMIN — LIDOCAINE 1 PATCH: 4 CREAM TOPICAL at 05:48

## 2023-03-16 RX ADMIN — Medication 1000 MILLIGRAM(S): at 08:31

## 2023-03-16 RX ADMIN — MIRTAZAPINE 7.5 MILLIGRAM(S): 45 TABLET, ORALLY DISINTEGRATING ORAL at 12:08

## 2023-03-16 NOTE — PROGRESS NOTE ADULT - SUBJECTIVE AND OBJECTIVE BOX
Subjective: Patient with no overnight events. Doing well. Held up yesterday for transportation issues to rehab.     MEDICATIONS  (STANDING):  acetaminophen     Tablet .. 1000 milliGRAM(s) Oral every 8 hours  aspirin enteric coated 81 milliGRAM(s) Oral daily  atorvastatin 20 milliGRAM(s) Oral at bedtime  budesonide  80 MICROgram(s)/formoterol 4.5 MICROgram(s) Inhaler 2 Puff(s) Inhalation two times a day  celecoxib 200 milliGRAM(s) Oral two times a day  enoxaparin Injectable 40 milliGRAM(s) SubCutaneous every 24 hours  hydrocortisone 1% Cream 1 Application(s) Topical three times a day  lactated ringers. 1000 milliLiter(s) (100 mL/Hr) IV Continuous <Continuous>  lidocaine   4% Patch 1 Patch Transdermal every 24 hours  metoprolol tartrate 25 milliGRAM(s) Oral every 12 hours  mirtazapine 7.5 milliGRAM(s) Oral daily  modafinil 100 milliGRAM(s) Oral daily  pantoprazole    Tablet 40 milliGRAM(s) Oral before breakfast  polyethylene glycol 3350 17 Gram(s) Oral at bedtime  senna 2 Tablet(s) Oral at bedtime  tamsulosin 0.4 milliGRAM(s) Oral at bedtime    MEDICATIONS  (PRN):  aluminum hydroxide/magnesium hydroxide/simethicone Suspension 30 milliLiter(s) Oral every 6 hours PRN Dyspepsia  clobetasol 0.05% Cream 1 Application(s) Topical two times a day PRN Rash  HYDROmorphone   Tablet 4 milliGRAM(s) Oral every 4 hours PRN Moderate Pain (4 - 6)  HYDROmorphone   Tablet 6 milliGRAM(s) Oral every 4 hours PRN Severe Pain (7 - 10)  magnesium hydroxide Suspension 30 milliLiter(s) Oral daily PRN Constipation  ondansetron Injectable 4 milliGRAM(s) IV Push every 6 hours PRN Nausea and/or Vomiting      Allergies    Allergy Status Unknown    Intolerances    lactose (Diarrhea)      Vital Signs Last 24 Hrs  T(C): 36.4 (16 Mar 2023 04:59), Max: 36.6 (15 Mar 2023 14:33)  T(F): 97.5 (16 Mar 2023 04:59), Max: 97.9 (15 Mar 2023 14:33)  HR: 72 (16 Mar 2023 04:59) (63 - 72)  BP: 154/80 (16 Mar 2023 04:59) (116/69 - 154/80)  BP(mean): --  RR: 18 (16 Mar 2023 04:59) (18 - 18)  SpO2: 92% (16 Mar 2023 04:59) (92% - 95%)    Parameters below as of 15 Mar 2023 21:11  Patient On (Oxygen Delivery Method): room air        PHYSICAL EXAM:  GENERAL: NAD, well-groomed, well-developed  HEAD:  Atraumatic, Normocephalic  ENMT: Moist mucous membranes,   NECK: Supple, No JVD, Normal thyroid  NERVOUS SYSTEM:  All 4 extremities mobile, no gross sensory deficits.   CHEST/LUNG: Clear to auscultation bilaterally; No rales, rhonchi, wheezing, or rubs  HEART: Regular rate and rhythm; No murmurs, rubs, or gallops  ABDOMEN: Soft, Nontender, Nondistended; Bowel sounds present  EXTREMITIES:  2+ Peripheral Pulses, No clubbing, cyanosis, or edema      LABS:      Ca    9.3        15 Mar 2023 08:03          CAPILLARY BLOOD GLUCOSE          RADIOLOGY & ADDITIONAL TESTS:    Imaging Personally Reviewed:  [ ] YES     Consultant(s) Notes Reviewed:      Care Discussed with Consultants/Other Providers:    Advanced Directives: [ ] DNR  [ ] No feeding tube  [ ] MOLST in chart  [ ] MOLST completed today  [ ] Unknown

## 2023-03-16 NOTE — CAREGIVER ENGAGEMENT NOTE - CAREGIVER EDUCATION NOTES - FREE TEXT
Per discussion w/ inpatient interdisciplinary treatment team this AM, patient may be approaching discharge readiness as he has been more able to engage in physical & occupational therapy through the weekend.  sent updated clinical information to patient's daughters' sub-acute rehab facility choices and received the following admission decisions: [facility] in [place] is able to accept as long as patient is agreeable to his daily co-pay; [facility] in [place] wanted to know what patient's discharge plan would be if he is not able to complete transfers independently prior to the end of his sub-acute rehab stay; and [facility] in [place] is unable to accept.  consulted w/ patient's daughter about patient's sub-acute rehab co-pay & about [facility]'s question and then provided response (daughter said they can increase patient's level of care @ the assisted living facility if needed). During discussion w/ daughter,  made her aware that patient is approaching discharge readiness, and daughter stated that she would talk to patient to prepare him for upcoming discharge. Will continue to follow.
Per review of sub-acute rehab referral sent out yesterday (03/06/2023), [facility] in [place] needs to see patient make gains w/ physical therapy in order to be accepted there.  spoke w/ patient's daughter Coleen who requested sub-acute rehab referral also be sent to the [facility] in [place] and [facility] in [place];  sent referral accordingly, and patient was accepted to [facility] pending bed availability on date of discharge, and [facility] is still reviewing the referral. Will continue to follow.
Per discussion w/ inpatient interdisciplinary treatment team this AM, patient continues to demonstrate improved & consistent participation w/ P.T./O.T. departments. Per review of sub-acute rehab referral, patient has been accepted to [facility] in [place] and [facility] in [place] pending bed availability on date of discharge, and patient was not accepted to the [facility] in [place].  therefore met w/ patient's daughter @ bedside on unit 1East for discussion of the above; daughter reported that she would talk to patient about the above two choices and agreed to have  call both facilities tomorrow AM (03/15/2023) to see which has an available bed for discharge. Will continue to follow.
Per discussion w/ inpatient interdisciplinary treatment team this AM, patient remains medically cleared for transition to next level of care today, 03/16/2023.  spoke w/ Wanda RANGEL in the admissions dept for patient's preferred sub-acute rehab facility, [facility] in [place], who confirmed that patient has been accepted w/ a start of care date/time for today, 03/16/2023, @ 13:00. Afterward,  met w/ patient @ bedside on unit 1East and spoke w/ patient's daughter, Mrs. Kenya Rueda, @ [phone], and both reported to be understanding of and agreeable to the above discharge plan. Next,  requested non-emergent ambulance for discharge transport via sending electronic referral to [facility] -- trip assigned to transport provider TEENA @ [phone]. Of note, no pre-authorization required for sub-acute rehab nor non-emergent ambulance transport as patient's primary health insurance is Medicare.    Attending MD Mackay & unit RN Lydia both aware. Packet of clinical information, including non-emergent ambulance transport certification form, left in RN station on unit to accompany patient to receiving facility.  to remain available for any continued needs.

## 2023-03-16 NOTE — SOCIAL WORK PROGRESS NOTE - NSSWPROGRESSNOTE_GEN_ALL_CORE
Per discussion w/ inpatient interdisciplinary treatment team this AM, patient remains medically cleared for transition to next level of care today, 03/16/2023.  spoke w/ Wanda RANGEL in the admissions dept for patient's preferred sub-acute rehab facility, [facility] in [place], who confirmed that patient has been accepted w/ a start of care date/time for today, 03/16/2023, @ 13:00. Afterward,  met w/ patient @ bedside on unit 1East and spoke w/ patient's daughter, Mrs. Kenya Rueda, @ [phone], and both reported to be understanding of and agreeable to the above discharge plan. Next,  requested non-emergent ambulance for discharge transport via sending electronic referral to [facility] -- trip assigned to transport provider TEENA @ [phone]. Of note, no pre-authorization required for sub-acute rehab nor non-emergent ambulance transport as patient's primary health insurance is Medicare.    Attending MD Mackay & unit RN Lydia both aware. Packet of clinical information, including non-emergent ambulance transport certification form, left in RN station on unit to accompany patient to receiving facility.  to remain available for any continued needs.

## 2023-03-16 NOTE — PROGRESS NOTE ADULT - NUTRITIONAL ASSESSMENT
This patient has been assessed with a concern for Malnutrition and has been determined to have a diagnosis/diagnoses of Severe protein-calorie malnutrition.    This patient is being managed with:   Diet DASH/TLC-  Sodium & Cholesterol Restricted  Lactose Restricted (Milk Sugar Intoler.)  Supplement Feeding Modality:  Oral  Ensure Plus High Protein Cans or Servings Per Day:  1       Frequency:  Daily  Entered: Jan 24 2023  1:54PM    Diet DASH/TLC-  Sodium & Cholesterol Restricted  Lactose Restricted (Milk Sugar Intoler.)  Entered: Jan 17 2023  9:36PM    The following pending diet order is being considered for treatment of Severe protein-calorie malnutrition:null

## 2023-03-16 NOTE — PROGRESS NOTE ADULT - SUBJECTIVE AND OBJECTIVE BOX
Chief Complaint: Fall    Interval Events: No events overnight.    Review of Systems:  General: No fevers, chills, weight gain  Skin: No rashes, color changes  Cardiovascular: No chest pain, orthopnea  Respiratory: No shortness of breath, cough  Gastrointestinal: No nausea, abdominal pain  Genitourinary: No incontinence, pain with urination  Musculoskeletal: No pain, swelling, decreased range of motion  Neurological: No headache, weakness  Psychiatric: No depression, anxiety  Endocrine: No weight gain, increased thirst  All other systems are comprehensively negative.    Physical Exam:  Vital Signs Last 24 Hrs  T(C): 36.4 (16 Mar 2023 04:59), Max: 36.6 (15 Mar 2023 14:33)  T(F): 97.5 (16 Mar 2023 04:59), Max: 97.9 (15 Mar 2023 14:33)  HR: 72 (16 Mar 2023 04:59) (63 - 72)  BP: 154/80 (16 Mar 2023 04:59) (116/69 - 154/80)  BP(mean): --  RR: 18 (16 Mar 2023 04:59) (18 - 18)  SpO2: 92% (16 Mar 2023 04:59) (92% - 95%)  Parameters below as of 15 Mar 2023 21:11  Patient On (Oxygen Delivery Method): room air  General: NAD  HEENT: MMM  Neck: No JVD, no carotid bruit  Lungs: CTAB  CV: RRR, nl S1/S2, no M/R/G  Abdomen: S/NT/ND, +BS  Extremities: No LE edema, no cyanosis  Neuro: AAOx3, non-focal  Skin: No rash    Labs:    03-15    140  |  106  |  23  ----------------------------<  95  4.0   |  26  |  0.66    Ca    9.3      15 Mar 2023 08:03                          10.9   10.12 )-----------( 361      ( 15 Mar 2023 08:03 )             32.9

## 2023-03-16 NOTE — PROGRESS NOTE ADULT - SUBJECTIVE AND OBJECTIVE BOX
POST OPERATIVE DAY #: 12    82y Male  s/p  Right Hip fracture ORIF                    SUBJECTIVE: Patient seen and examined at bedside.     Pain:  well controlled    Pain scale: 4  /10  Denies CP, SOB, N/V/D, weakness, numbness   No new complains     OBJECTIVE:     Vital Signs Last 24 Hrs  T(C): 36.4 (16 Mar 2023 04:59), Max: 36.6 (15 Mar 2023 14:33)  T(F): 97.5 (16 Mar 2023 04:59), Max: 97.9 (15 Mar 2023 14:33)  HR: 72 (16 Mar 2023 04:59) (63 - 72)  BP: 154/80 (16 Mar 2023 04:59) (116/69 - 154/80)  BP(mean): --  RR: 18 (16 Mar 2023 04:59) (18 - 18)  SpO2: 92% (16 Mar 2023 04:59) (92% - 95%)    Parameters below as of 15 Mar 2023 21:11  Patient On (Oxygen Delivery Method): room air        Affected extremity: RLE         Dressing: silverlon,clean/dry/intact                      Sensation: intact to light touch          Motor exam:  5 / 5 Tibialis Anterior/Gastrocnemius-Soleus, EHL/FHL         warm, well-perfused; capillary refill < 3 seconds         negative calf tenderness B/L LE  LABS:                        10.9   10.12 )-----------( 361      ( 15 Mar 2023 08:03 )             32.9     03-15    140  |  106  |  23  ----------------------------<  95  4.0   |  26  |  0.66    Ca    9.3      15 Mar 2023 08:03        I&O's Detail      MEDICATIONS:  Infection prophylaxis:    Pain management:  acetaminophen     Tablet .. 1000 milliGRAM(s) Oral every 8 hours  celecoxib 200 milliGRAM(s) Oral two times a day  HYDROmorphone   Tablet 4 milliGRAM(s) Oral every 4 hours PRN  HYDROmorphone   Tablet 6 milliGRAM(s) Oral every 4 hours PRN  mirtazapine 7.5 milliGRAM(s) Oral daily  modafinil 100 milliGRAM(s) Oral daily  ondansetron Injectable 4 milliGRAM(s) IV Push every 6 hours PRN    DVT prophylaxis:   aspirin enteric coated 81 milliGRAM(s) Oral daily  enoxaparin Injectable 40 milliGRAM(s) SubCutaneous every 24 hours      RADIOLOGY & ADDITIONAL STUDIES:    ASSESSMENT AND PLAN:   - silvorlon dressing mix 5 more days, staples removal in 5 days, follow up with Dr Sanchez in 2 weeks   - Analgesic pain control  - DVT prophylaxis:  Lovenox 40mg daily    SCDs       - Pain Management: Celebrex 200mg twice a day x 21 days   - PT/OT: Weight Bearing Status:  Weight bearing as tolerated, OOBTC       -  Incentive spirometry  - Advance diet as tolerated  - Hospitalist is following  -  Follow up labs  -  Disposition: Subacute Rehab

## 2023-03-16 NOTE — PROGRESS NOTE ADULT - REASON FOR ADMISSION
Hip Fracture
Hip Fracture--for ORIF right hip w/ gamma nail
Hip Fracture

## 2023-03-16 NOTE — CAREGIVER ENGAGEMENT NOTE - CAREGIVER NAME
Jana of [facility] in [place]; Patient's daughters Coleen & Kenya
Patient's daughter Kenya
Patient's daughter, Mrs. Kenya Rueda, @ [phone]
Patient's daughter, Mrs. Kenya Rueda, @ [phone]
Patient; patient's daughter, Mrs. Kenya Rueda, @ [phone];

## 2023-03-16 NOTE — PROGRESS NOTE ADULT - PROVIDER SPECIALTY LIST ADULT
Cardiology
Cardiology
Hospitalist
Hospitalist
Orthopedics
Cardiology
Hospitalist
Orthopedics
Cardiology
Cardiology
Hospitalist
Orthopedics
Hospitalist

## 2023-03-16 NOTE — CAREGIVER ENGAGEMENT NOTE - CAREGIVER EDUCATION - TYPES DISCUSSED
Assisted living/Discharge plan/DME/Insurance benefits/Post-acute care agency contact/Post-discharge escalation process/Transportation coordination/Transportation letter provided
Assisted living/Discharge plan/DME/Insurance benefits/Post-acute care agency contact/Post-discharge escalation process/Transportation coordination/Transportation letter provided
After-care skilled tasks/Assisted living/Discharge plan/DME/Insurance benefits/Post-acute care agency contact/Post-discharge escalation process/Transportation coordination/Transportation letter provided
After-care skilled tasks/Assisted living/Discharge plan/Post-acute care agency contact/Post-discharge escalation process/Transportation coordination/Transportation letter provided
After-care skilled tasks/Assisted living/Discharge plan/DME/Insurance benefits/Post-acute care agency contact/Post-discharge escalation process/Transportation coordination/Transportation letter provided

## 2023-03-16 NOTE — CAREGIVER ENGAGEMENT NOTE - CAREGIVER OUTREACH - FIRST ATTEMPT
Successfully contacted

## 2023-03-20 NOTE — CHART NOTE - NSCHARTNOTEFT_GEN_A_CORE
Assessment:  Per H&P "82M Atrial Fibrillation, HTN, HLD, Anxiety and Depression brought in by EMS after having a mechanical fall at his assisted living facility. Patient reports no palpitations, dizziness, light headedness or LOC during this event. Evaluated in the ED and found to have closed fracture of the right femur.  Patient currently received pain medication and resting comfortable.  Of note patient was admitted to our service at this hospital last month for fever and hypertension. Adjustments to his BP medications were made and treated with IV Antibiotics at the time."    3/13  Pt due for nutrition follow-up.  Pt continues on regular (lactose free) diet with variable po intake of meals throughout admission.  Per nursing, pt ate well at breakfast.  Observed good intake at lunch meal.  Meal selections discussed regularly to optimize po intake and tolerance.  During time of initial assessment, pt reported he is a picky eater; pt previously educated on importance of adequate protein/energy intake to prevent weight loss/promote wound healing, prioritize protein at each meal.  Recommend continue to encourage and monitor po intake.  +BM 3/13.  RD to follow up and will continue to monitor pt's nutrition status.    Factors impacting intake: [ ] none [ ] nausea  [ ] vomiting [ ] diarrhea [ ] constipation  [ ]chewing problems [ ] swallowing issues  [ ] other:     Diet Prescription: Diet, Regular (03-04-23 @ 10:55)    Intake: variable, poor-fair; picky eater per pt report; refuses ONS, additional protein portions as appropriate with meals    Current Weight:   % Weight Change  3/8 214#  Reported # 9 months ago, 200# in Jan2023, current adm weight 200#  will continue to monitor weight trends as able    Pertinent Medications: MEDICATIONS  (STANDING):  acetaminophen     Tablet .. 1000 milliGRAM(s) Oral every 8 hours  aspirin enteric coated 81 milliGRAM(s) Oral daily  atorvastatin 20 milliGRAM(s) Oral at bedtime  budesonide  80 MICROgram(s)/formoterol 4.5 MICROgram(s) Inhaler 2 Puff(s) Inhalation two times a day  celecoxib 200 milliGRAM(s) Oral two times a day  enoxaparin Injectable 40 milliGRAM(s) SubCutaneous every 24 hours  hydrocortisone 1% Cream 1 Application(s) Topical three times a day  lactated ringers. 1000 milliLiter(s) (100 mL/Hr) IV Continuous <Continuous>  metoprolol tartrate 25 milliGRAM(s) Oral every 12 hours  mirtazapine 7.5 milliGRAM(s) Oral daily  modafinil 100 milliGRAM(s) Oral daily  pantoprazole    Tablet 40 milliGRAM(s) Oral before breakfast  polyethylene glycol 3350 17 Gram(s) Oral at bedtime  senna 2 Tablet(s) Oral at bedtime  tamsulosin 0.4 milliGRAM(s) Oral at bedtime    MEDICATIONS  (PRN):  aluminum hydroxide/magnesium hydroxide/simethicone Suspension 30 milliLiter(s) Oral every 6 hours PRN Dyspepsia  clobetasol 0.05% Cream 1 Application(s) Topical two times a day PRN Rash  HYDROmorphone   Tablet 4 milliGRAM(s) Oral every 4 hours PRN Moderate Pain (4 - 6)  HYDROmorphone   Tablet 6 milliGRAM(s) Oral every 4 hours PRN Severe Pain (7 - 10)  magnesium hydroxide Suspension 30 milliLiter(s) Oral daily PRN Constipation  ondansetron Injectable 4 milliGRAM(s) IV Push every 6 hours PRN Nausea and/or Vomiting    Pertinent Labs:  03-08 Alb 2.4 g/dL<L>     CAPILLARY BLOOD GLUCOSE        Skin: no pressure ulcers; R buttock wound    Estimated Needs:   [ x] no change since previous assessment  [ ] recalculated:     Previous Nutrition Diagnosis:   [ ] Inadequate Energy Intake [x ]Inadequate Oral Intake [ ] Excessive Energy Intake   [ ] Underweight [ ] Increased Nutrient Needs [ ] Overweight/Obesity   [ ] Altered GI Function [ ] Unintended Weight Loss [ ] Food & Nutrition Related Knowledge Deficit [ ] Malnutrition     Nutrition Diagnosis is [ x] ongoing  [ ] resolved [ ] not applicable     New Nutrition Diagnosis: [ ] not applicable       Interventions: regular diet, encourage and monitor po intake  Recommend  [ ] Change Diet To:  [ ] Nutrition Supplement  [ ] Nutrition Support  [ ] Other:     Monitoring and Evaluation:   [x ] PO intake [ x ] Tolerance to diet prescription [ x ] weights [ x ] labs[ x ] follow up per protocol  [ ] other:
Assessment: Per H&P "82M Atrial Fibrillation, HTN, HLD, Anxiety and Depression brought in by EMS after having a mechanical fall at his assisted living facility. Patient reports no palpitations, dizziness, light headedness or LOC during this event. Evaluated in the ED and found to have closed fracture of the right femur.  Patient currently received pain medication and resting comfortable.  Of note patient was admitted to our service at this hospital last month for fever and hypertension. Adjustments to his BP medications were made and treated with IV Antibiotics at the time."    3/10  Pt due for nutrition follow-up.  Observed pt sleeping during multiple follow up attempts.  Lunch tray at bedside untouched.  Pt continues on regular (lactose free) diet with variable po intake of meals throughout admission (generally consuming <50% of most meals).  Per nursing, pt ate fairly at breakfast.  Meal selections discussed regularly to optimize po intake and tolerance.  During time of initial assessment, pt reported he is a picky eater; pt previously educated on importance of adequate protein/energy intake to prevent weight loss/promote wound healing, prioritize protein at each meal.  Pt has refused nutrition supplements.  Recommend continue to encourage and monitor po intake  RD to follow up and will continue to monitor pt's nutrition status.    Factors impacting intake: [ ] none [ ] nausea  [ ] vomiting [ ] diarrhea [ ] constipation  [ ]chewing problems [ ] swallowing issues  [ ] other:     Diet Prescription: Diet, Regular (03-04-23 @ 10:55)    Intake: variable, poor-fair; picky eater per pt report; refuses ONS, additional protein portions as appropriate with meals    Current Weight: Weight (kg): 90.7 (03-03 @ 13:45)  % Weight Change  3/8 314#  Reported # 9 months ago, 200# in Jan2023, current adm weight 200#, weight appears to be stable x1 month, 9%/insignificant weight loss in 1 year, will continue to monitor weight trends as able    Pertinent Medications: MEDICATIONS  (STANDING):  acetaminophen     Tablet .. 1000 milliGRAM(s) Oral every 8 hours  aspirin enteric coated 81 milliGRAM(s) Oral daily  atorvastatin 20 milliGRAM(s) Oral at bedtime  budesonide  80 MICROgram(s)/formoterol 4.5 MICROgram(s) Inhaler 2 Puff(s) Inhalation two times a day  celecoxib 200 milliGRAM(s) Oral two times a day  enoxaparin Injectable 40 milliGRAM(s) SubCutaneous every 24 hours  hydrocortisone 1% Cream 1 Application(s) Topical three times a day  lactated ringers. 1000 milliLiter(s) (100 mL/Hr) IV Continuous <Continuous>  metoprolol tartrate 25 milliGRAM(s) Oral every 12 hours  mirtazapine 7.5 milliGRAM(s) Oral daily  modafinil 100 milliGRAM(s) Oral daily  pantoprazole    Tablet 40 milliGRAM(s) Oral before breakfast  polyethylene glycol 3350 17 Gram(s) Oral at bedtime  senna 2 Tablet(s) Oral at bedtime  tamsulosin 0.4 milliGRAM(s) Oral at bedtime    MEDICATIONS  (PRN):  ALPRAZolam 0.25 milliGRAM(s) Oral every 12 hours PRN anxiety  aluminum hydroxide/magnesium hydroxide/simethicone Suspension 30 milliLiter(s) Oral every 6 hours PRN Dyspepsia  clobetasol 0.05% Cream 1 Application(s) Topical two times a day PRN Rash  HYDROmorphone   Tablet 4 milliGRAM(s) Oral every 4 hours PRN Moderate Pain (4 - 6)  HYDROmorphone   Tablet 6 milliGRAM(s) Oral every 4 hours PRN Severe Pain (7 - 10)  HYDROmorphone  Injectable 0.25 milliGRAM(s) IV Push every 3 hours PRN breakthrough  magnesium hydroxide Suspension 30 milliLiter(s) Oral daily PRN Constipation  ondansetron Injectable 4 milliGRAM(s) IV Push every 6 hours PRN Nausea and/or Vomiting    Pertinent Labs: 03-10 Na143 mmol/L Glu 96 mg/dL K+ 4.1 mmol/L Cr  0.83 mg/dL BUN 26 mg/dL<H> 03-08 Alb 2.4 g/dL<L>     CAPILLARY BLOOD GLUCOSE        Skin: no pressure ulcers; R buttock wound    Estimated Needs:   [x ] no change since previous assessment  [ ] recalculated:     Previous Nutrition Diagnosis:   [ ] Inadequate Energy Intake [x ]Inadequate Oral Intake [ ] Excessive Energy Intake   [ ] Underweight [ ] Increased Nutrient Needs [ ] Overweight/Obesity   [ ] Altered GI Function [ ] Unintended Weight Loss [ ] Food & Nutrition Related Knowledge Deficit [ ] Malnutrition     Nutrition Diagnosis is [ x] ongoing  [ ] resolved [ ] not applicable     New Nutrition Diagnosis: [ ] not applicable       Interventions: regular diet, encourage and monitor po intake  Recommend  [ ] Change Diet To:  [ ] Nutrition Supplement  [ ] Nutrition Support  [ ] Other:     Monitoring and Evaluation:   [ x] PO intake [ x ] Tolerance to diet prescription [ x ] weights [ x ] labs[ x ] follow up per protocol  [ ] other:
yes

## 2023-12-06 NOTE — DIETITIAN INITIAL EVALUATION ADULT - NUTRITON FOCUSED PHYSICAL EXAM
Patient: Carter Jefferson  : 1962    Encounter Date: 2023    Subjective- monthly follow up.   H/O schizophrenia, hypertension, hyperlipidemia, diabetes seen today for a routine visit .He is lying in his bed and denies any complaints no concerns per staff.   ROS:   General-no fever   Chest-no pain   Respiratory-nocough   Abdomen-no pain or diarrhea   General: no acute distress   Pain:   Vital signs: /95   T 97   79 Weight 148 lbs   Gen- NAD, sitting in bed   Lungs: clear to auscultation B/L   Cardio: S1-S2 normal   ABD: Soft, nontender   Musc/Skel:No deformities   Extremities: No pedal edema   Neuro: No neurological deficits   Psych: Schizophrenia     Assessment/plan-   Hypertension- stable   c/w ASA   Hyperlipidemia-   Diabetes type 2-Hba1c 6.1   c/w Metformin   Schizophrenia-continue with fluphenazine   Follow-up with psych   Weakness- c/w supportive care      Electronically Signed By: Dianne Jewell MD   23  2:13 PM   yes...

## 2024-05-16 NOTE — ED PROVIDER NOTE - MDM ORDERS SUBMITTED SELECTION
Anesthesia Post-op Note    Patient: Jas Betts  Procedure(s) Performed: TRANSESOPHAGEAL ECHO (STAS) W/ W/O IMAGING AGENT   Anesthesia type: MAC    Vitals Value Taken Time   Temp 97 05/16/24 0847   Pulse 64 05/16/24 0847   Resp 18 05/16/24 0847   SpO2 100 05/16/24 0847   /57 05/16/24 0847         Patient Location: Phase II  Post-op Vital Signs:stable  Level of Consciousness: participates in exam, awake, oriented and answers questions appropriately  Respiratory Status: spontaneous ventilation, unassisted and room air  Cardiovascular stable  Hydration: euvolemic  Pain Management: well controlled  Handoff: Handoff to receiving clinician was performed and questions were answered  Vomiting: none  Nausea: None  Airway Patency:patent  Post-op Assessment: awake, alert, appropriately conversant, or baseline, no complications, patient tolerated procedure well, dentition within defined limits, moving all extremities and No Corneal Abrasion      No notable events documented.                       Labs/EKG/Imaging Studies/Medications

## 2024-09-02 ENCOUNTER — EMERGENCY (EMERGENCY)
Facility: HOSPITAL | Age: 84
LOS: 0 days | Discharge: HOME | End: 2024-09-03
Attending: STUDENT IN AN ORGANIZED HEALTH CARE EDUCATION/TRAINING PROGRAM
Payer: MEDICARE

## 2024-09-02 VITALS
RESPIRATION RATE: 17 BRPM | WEIGHT: 197.98 LBS | SYSTOLIC BLOOD PRESSURE: 168 MMHG | HEART RATE: 70 BPM | TEMPERATURE: 98 F | DIASTOLIC BLOOD PRESSURE: 77 MMHG | HEIGHT: 68 IN

## 2024-09-02 DIAGNOSIS — I48.91 UNSPECIFIED ATRIAL FIBRILLATION: ICD-10-CM

## 2024-09-02 DIAGNOSIS — S09.90XA UNSPECIFIED INJURY OF HEAD, INITIAL ENCOUNTER: ICD-10-CM
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
## 2024-09-02 DIAGNOSIS — J45.909 UNSPECIFIED ASTHMA, UNCOMPLICATED: ICD-10-CM

## 2024-09-02 DIAGNOSIS — I10 ESSENTIAL (PRIMARY) HYPERTENSION: ICD-10-CM

## 2024-09-02 DIAGNOSIS — S00.31XA ABRASION OF NOSE, INITIAL ENCOUNTER: ICD-10-CM

## 2024-09-02 PROCEDURE — 99284 EMERGENCY DEPT VISIT MOD MDM: CPT

## 2024-09-02 PROCEDURE — 72125 CT NECK SPINE W/O DYE: CPT | Mod: 26,MC

## 2024-09-02 PROCEDURE — 70450 CT HEAD/BRAIN W/O DYE: CPT | Mod: 26,MC

## 2024-09-02 PROCEDURE — 99284 EMERGENCY DEPT VISIT MOD MDM: CPT | Mod: 25

## 2024-09-02 PROCEDURE — 72125 CT NECK SPINE W/O DYE: CPT | Mod: MC

## 2024-09-02 PROCEDURE — 70450 CT HEAD/BRAIN W/O DYE: CPT | Mod: MC

## 2024-09-02 NOTE — ED ADULT NURSE NOTE - OBJECTIVE STATEMENT
The pt is a 82 y/o male A&OX4 ambulatory at baseline with a walker presenting to the ED s/p fall. Pt reports "I was going to the bathroom with my walker and while I was in the bathroom I slipped hit my face on the shower and fell down." Pt reports he did hit his head on the shower and takes asprin daily. Pt denies LOC, pain, CP, SOB, N/V/D, fever. Respirations even and unlabored, no distress noted. Redness noted to bridge of nose, bleeding controlled at this time.

## 2024-09-02 NOTE — ED ADULT NURSE NOTE - NSFALLHARMRISKINTERV_ED_ALL_ED

## 2024-09-02 NOTE — ED ADULT NURSE NOTE - CHIEF COMPLAINT QUOTE
Pt BIBEMS s/p unwitnessed fall. Pt lives at [facility] and was getting up to the bathroom. Pt tripped and fell, striking his nose. + headstrike. - LOC. + blood thinners. Laceration noted to bridge of nose, bleeding controlled on arrival. AO x 4 in triage. No meds PTA. NA called at 23:00. Pt taken to CT.

## 2024-09-02 NOTE — ED PROVIDER NOTE - OBJECTIVE STATEMENT
83-year-old male with history of hypertension, A-fib  Not on anticoagulation, asthma presents to the ER after head injury. Patient states that he was attempting to go to the bathroom when he tripped on his walker and fell.  Hit his nose on the ground.  Did not lose consciousness.  Denies chest pain, shortness of breath, palpitations, syncope.  Denies any other injury.

## 2024-09-02 NOTE — ED PROVIDER NOTE - CLINICAL SUMMARY MEDICAL DECISION MAKING FREE TEXT BOX
Patient presents to the ER status post mechanical fall and head injury. patient has a small abrasion on the bridge of his nose.  No tenderness to the nose no obvious deformity.  Neuro alert called CT head and C-spine ordered. Patient presents to the ER status post mechanical fall and head injury. patient has a small abrasion on the bridge of his nose.  No tenderness to the nose no obvious deformity.  Neuro alert called CT head and C-spine ordered. Shows soft tissue swelling of nose but no other acute findings. Patient AOx4. Ambulating independently in ED with walker. Tolerating PO. Stable for dc. Patient verbalizes understanding of return precautions and f/u.

## 2024-09-02 NOTE — ED PROVIDER NOTE - PATIENT PORTAL LINK FT
You can access the FollowMyHealth Patient Portal offered by [facility] by registering at the following website: [email or web address]. By joining [employer]’s FollowMyHealth portal, you will also be able to view your health information using other applications (apps) compatible with our system.

## 2024-09-02 NOTE — ED ADULT TRIAGE NOTE - CHIEF COMPLAINT QUOTE
Pt BIBEMS s/p unwitnessed fall. Pt lives at [facility] and was getting up to the bathroom. Pt tripped and fell, striking his nose. + headstrike. - LOC. + blood thinners. Laceration noted to bride of nose, bleeding controlled on arrival. AO x 4. in triage. No meds PTA. NA called at 23:00. Pt taken to CT. Pt BIBEMS s/p unwitnessed fall. Pt lives at [facility] and was getting up to the bathroom. Pt tripped and fell, striking his nose. + headstrike. - LOC. + blood thinners. Laceration noted to bridge of nose, bleeding controlled on arrival. AO x 4 in triage. No meds PTA. NA called at 23:00. Pt taken to CT.

## 2024-09-03 VITALS
TEMPERATURE: 98 F | HEART RATE: 65 BPM | RESPIRATION RATE: 16 BRPM | DIASTOLIC BLOOD PRESSURE: 90 MMHG | OXYGEN SATURATION: 95 % | SYSTOLIC BLOOD PRESSURE: 155 MMHG

## 2024-10-07 NOTE — PATIENT PROFILE ADULT - PATIENT'S PREFERRED PRONOUN
Pharmacy Name: [facility] - [facility] - [address] [facility] AT PORTAL TO REGISTERED [facility] - [phone]  - [phone] FX     Reference Number (if applicable): [id number]    Pharmacy representative phone number: [phone] OPT 2    What medication are you calling in regards to: TRAMADOL    What question does the pharmacy have: THE PHARMACY IS SEEING THAT THE PATIENT MAY BE ALLERGIC TO THE MEDICATION OR AN INGREDIENT WITHIN IT.     Who is the provider that prescribed the medication: DR WHELAN     Additional notes: N/A     Him/He

## (undated) DEVICE — DRSG COMBINE 5X9"

## (undated) DEVICE — GLV 8.5 PROTEXIS (WHITE)

## (undated) DEVICE — SUT POLYSORB 2-0 30" GS-11 UNDYED

## (undated) DEVICE — PACK HIP FRACTURE

## (undated) DEVICE — APPLICATOR PACK

## (undated) DEVICE — VENODYNE/SCD SLEEVE CALF MEDIUM

## (undated) DEVICE — SOL IRR POUR NS 0.9% 1000ML

## (undated) DEVICE — DRSG CURITY GAUZE SPONGE 4 X 4" 12-PLY

## (undated) DEVICE — BLADE SURGICAL #10 CARBON

## (undated) DEVICE — APPLICATION TIP

## (undated) DEVICE — DRAPE C ARM UNIVERSAL

## (undated) DEVICE — DRAPE ISOLATION W INCISE FILM & POUCH

## (undated) DEVICE — SUT POLYSORB 1 36" GS-11 UNDYED

## (undated) DEVICE — GPS III SNGL SEPARATION PACK

## (undated) DEVICE — STAPLER SKIN VISI-STAT 35 WIDE

## (undated) DEVICE — WARMING BLANKET UPPER ADULT

## (undated) DEVICE — DRAPE TOWEL BLUE 17" X 24"

## (undated) DEVICE — SOL IRR POUR H2O 1000ML

## (undated) DEVICE — DRILL BIT STRYKER ORTHO 4.2 X 340MM

## (undated) DEVICE — DRSG ADAPTIC 3 X 3"